# Patient Record
Sex: MALE | Race: WHITE | NOT HISPANIC OR LATINO | Employment: OTHER | URBAN - METROPOLITAN AREA
[De-identification: names, ages, dates, MRNs, and addresses within clinical notes are randomized per-mention and may not be internally consistent; named-entity substitution may affect disease eponyms.]

---

## 2017-01-03 DIAGNOSIS — F17.200 TOBACCO USE DISORDER: Primary | ICD-10-CM

## 2017-01-03 RX ORDER — VARENICLINE TARTRATE 1 MG/1
TABLET, FILM COATED ORAL
Qty: 60 TABLET | Status: CANCELLED | OUTPATIENT
Start: 2017-01-03

## 2017-01-03 NOTE — TELEPHONE ENCOUNTER
Varenicline Tartrate (CHANTIX PO)      Last Written Prescription Date:  na  Last Fill Quantity: na,   # refills: na  Last Office Visit with [facility], P or [facility] prescribing provider: 12/08/16  Future Office visit:       Routing refill request to provider for review/approval because:  Medication is reported/historical      [facility]

## 2017-01-05 RX ORDER — VARENICLINE TARTRATE 1 MG/1
1 TABLET, FILM COATED ORAL 2 TIMES DAILY
Qty: 60 TABLET | Refills: 4 | Status: SHIPPED | OUTPATIENT
Start: 2017-02-04 | End: 2017-03-04

## 2017-02-01 ENCOUNTER — TELEPHONE (OUTPATIENT)
Dept: ORTHOPEDICS | Facility: CLINIC | Age: 62
End: 2017-02-01

## 2017-02-01 NOTE — TELEPHONE ENCOUNTER
VORB order left in VM message to [facility] de[facility] re: ARCR with open distal claviculectomy. He is OK to D/C s ling use per Dr Brower.  Davion Lamar OPA

## 2017-02-01 NOTE — TELEPHONE ENCOUNTER
Kendra from [facility] calling. How long is he to ware his sling. He is 7 wks post op. Please call and advise.

## 2017-02-02 ENCOUNTER — TELEPHONE (OUTPATIENT)
Dept: PHYSICAL THERAPY | Facility: CLINIC | Age: 62
End: 2017-02-02

## 2017-02-03 NOTE — TELEPHONE ENCOUNTER
Called patient because he had an appointment on 1/26/17 with [facility] for physical therapy.  The patient left without being seen because he was unhappy.  Called patient to understand the situation, and problems he had, and to apologize for any problems.  Patient was upset because he went to old clinic location for his appointment.  He stated that didn't receive a letter/email saying the clinic had moved.  The directions at the old clinic where unclear and Google maps wasn't very helpful.  Mayur was also upset there wasn't any signage on the building to tell him [facility] was in the [facility].  Once the patient arrived to the new clinic the check in process wasn't clear and he was pressed for time because he needed to be back at work.  Mayur stated his last straw was the [facility] check in line being the same line as urgent care and using the same waiting room as sick people.  He was happy with previous care at the old building but wanted to get care elsewhere.  I apologized for the problems he had and wished him well in his recovery.

## 2017-02-13 DIAGNOSIS — I10 ESSENTIAL HYPERTENSION WITH GOAL BLOOD PRESSURE LESS THAN 140/90: ICD-10-CM

## 2017-02-13 NOTE — TELEPHONE ENCOUNTER
triamterene-hydrochlorothiazide (MAXZIDE) 75-50 MG per tablet      Last Written Prescription Date: 9/7/16  Last Fill Quantity: 30, # refills: 2  Last Office Visit with G, P or [facility] prescribing provider: 128/16       Potassium   Date Value Ref Range Status   12/08/2016 3.9 3.4 - 5.3 mmol/L Final     Creatinine   Date Value Ref Range Status   12/08/2016 0.97 0.66 - 1.25 mg/dL Final     BP Readings from Last 3 Encounters:   12/14/16 156/85   12/08/16 138/80   11/30/16 (!) 153/91

## 2017-02-14 NOTE — TELEPHONE ENCOUNTER
Routing refill request to provider for review/approval because:  Labs out of range:  Last blood pressure reading was elevated at 156/85.  Jacque Hadley RN

## 2017-02-15 NOTE — TELEPHONE ENCOUNTER
Reason for Call:  Other prescription    Detailed comments: Pt's Spouse calling back for Pt has been without medication for a couple of days and is concerned.  He would like to see if Dr. Valladares can refill Rx as soon as possible and he would liek a call back when rx has been senet.    Phone Number Patient can be reached at: Cell number on file:    No relevant phone numbers on file.       Best Time: Anytime    Can we leave a detailed message on this number? YES    Call taken on 2/15/2017 at 1:49 PM by Michael Jonas

## 2017-02-16 RX ORDER — TRIAMTERENE AND HYDROCHLOROTHIAZIDE 75; 50 MG/1; MG/1
1 TABLET ORAL DAILY
Qty: 30 TABLET | Refills: 3 | Status: SHIPPED | OUTPATIENT
Start: 2017-02-16 | End: 2017-05-30

## 2017-02-17 ENCOUNTER — TELEPHONE (OUTPATIENT)
Dept: ORTHOPEDICS | Facility: CLINIC | Age: 62
End: 2017-02-17

## 2017-02-17 NOTE — TELEPHONE ENCOUNTER
See Visto message sent today in response. P: JBR requests f/u vist to assess shoulder prior to any repeat MRI.       To   Mayur Sheth    Sent   2/17/2017  9:00 AM          Jeyson Merchant;     Sorry to hear about your fall. Dr Brower reviewed options and wants to see you prior to any new MRI to assess your shoulder function.  We made a return visit with you on Feb 21 @ [facility] at 10:30 am. MAYUR SHETH MRN: [id number]   Date: 2/21/2017 Status: Scheduled   Time: 10:30 AM     If MRI indicated we can order after visit.  Thank you for the update Mayur.     [facility].            Davion Lamar OPA

## 2017-02-17 NOTE — TELEPHONE ENCOUNTER
Patient has started physical therapy but slipped on the ice 2 weeks ago and he has had to stop PT because of the pain, patient is requesting an MRI to look at his left shoulder for a new injury and would like a follow up appointment with Dr. Brower to review. Please call and advise Thank you.

## 2017-02-17 NOTE — TELEPHONE ENCOUNTER
Medication filled.  Jill Valladares wants patient to know that he is due for a Hypertension follow up visit.  Patient notified.  Patient refuses to be seen before May when he had a physical.    Romelia Alcala RN

## 2017-02-21 ENCOUNTER — RADIANT APPOINTMENT (OUTPATIENT)
Dept: GENERAL RADIOLOGY | Facility: CLINIC | Age: 62
End: 2017-02-21
Attending: ORTHOPAEDIC SURGERY
Payer: COMMERCIAL

## 2017-02-21 ENCOUNTER — OFFICE VISIT (OUTPATIENT)
Dept: ORTHOPEDICS | Facility: CLINIC | Age: 62
End: 2017-02-21
Payer: COMMERCIAL

## 2017-02-21 VITALS — HEIGHT: 65 IN | WEIGHT: 222.8 LBS | RESPIRATION RATE: 12 BRPM | BODY MASS INDEX: 37.12 KG/M2

## 2017-02-21 DIAGNOSIS — M25.512 ACUTE POSTOPERATIVE PAIN OF LEFT SHOULDER: ICD-10-CM

## 2017-02-21 DIAGNOSIS — G89.18 ACUTE POSTOPERATIVE PAIN OF LEFT SHOULDER: ICD-10-CM

## 2017-02-21 DIAGNOSIS — Z98.890 S/P COMPLETE REPAIR OF ROTATOR CUFF: Primary | ICD-10-CM

## 2017-02-21 DIAGNOSIS — Z98.890 S/P COMPLETE REPAIR OF ROTATOR CUFF: ICD-10-CM

## 2017-02-21 PROCEDURE — 73030 X-RAY EXAM OF SHOULDER: CPT | Mod: LT

## 2017-02-21 PROCEDURE — 99024 POSTOP FOLLOW-UP VISIT: CPT | Performed by: ORTHOPAEDIC SURGERY

## 2017-02-21 ASSESSMENT — PAIN SCALES - GENERAL: PAINLEVEL: SEVERE PAIN (6)

## 2017-02-21 NOTE — PROGRESS NOTES
"Mayur is here status post arthroscopic left RCR, open distal claviculectomy. DOS: 12/14/16, 2 months ago. Biceps was absent, so no tenodesis. He had a fall 10 days ago and had to stop physical therapy because of burning pain in his left shoulder. He and his therapist are wondering about a new MRI scan.     Symptoms have gradually improved since that time.   Present symptoms: \"sharp burning\" pain over lateral shoulder     Resp 12  Ht 1.657 m (5' 5.25\")  Wt 101.1 kg (222 lb 12.8 oz)  BMI 36.79 kg/m2    Exam:    Tender: anterior acromion, distal clavicle   ROM:    Active: internal rotation L4   Passive: forward flexion 125 degrees, external rotation 60 degrees   Strength: he is able to hold up the arm against gravity and with light resistance, external rotation 5/5     X-rays: Obtained today of the LEFT SHOULDER: 3-views, reviewed in the office with the patient by myself today and show no fracture, including the acromion.     Assessment:     1. Doing well status post arthroscopic left RCR, open distal claviculectomy.     I don't think he re-injured his rotator cuff based on our limited exam.    Plan:  He does not need a new MRI. Continue with physical therapy.     Rehab plan:   AAROM x  Weeks 6-10 and AROM at the 10 week chey.     Return to clinic in 4 weeks.     MARLENY Brower MD  Dept. Orthopedic Surgery  [facility]    This document serves as a record of the services and decisions personally performed and made by Dr. MARLENY Brower MD. It was created on his behalf by Rin Fuentes, a trained medical scribe. The creation of this record is based on the provider's personal observations and the statements of the patient. This document has been checked and approved by the attending provider.   Rin Fuentes February 21, 2017 4:18 PM  "

## 2017-02-21 NOTE — MR AVS SNAPSHOT
"              After Visit Summary   2/21/2017    Mayur Sheth    MRN: [id number]           Patient Information     Date Of Birth          1955        Visit Information        Provider Department      2/21/2017 3:15 PM Donell Brower MD [facility]         Follow-ups after your visit        Who to contact     If you have questions or need follow up information about today's clinic visit or your schedule please contact [facility] directly at [phone].  Normal or non-critical lab and imaging results will be communicated to you by [employer], letter or phone within 4 business days after the clinic has received the results. If you do not hear from us within 7 days, please contact the clinic through [employer] or phone. If you have a critical or abnormal lab result, we will notify you by phone as soon as possible.  Submit refill requests through [employer] or call your pharmacy and they will forward the refill request to us. Please allow 3 business days for your refill to be completed.          Additional Information About Your Visit        MyChart Information     [employer] gives you secure access to your electronic health record. If you see a primary care provider, you can also send messages to your care team and make appointments. If you have questions, please call your primary care clinic.  If you do not have a primary care provider, please call [phone] and they will assist you.        Care EveryWhere ID     This is your Care EveryWhere ID. This could be used by other organizations to access your [place] medical records  [id number]        Your Vitals Were     Respirations Height BMI (Body Mass Index)             12 1.657 m (5' 5.25\") 36.79 kg/m2          Blood Pressure from Last 3 Encounters:   12/14/16 156/85   12/08/16 138/80   11/30/16 (!) 153/91    Weight from Last 3 Encounters:   02/21/17 101.1 kg (222 lb 12.8 oz)   12/28/16 101.4 kg (223 lb 9.6 oz)   12/07/16 95.3 kg " (210 lb)              Today, you had the following     No orders found for display       Primary Care Provider Office Phone # Fax #    Atul Valladares [phone]       [facility] [address]  [facility] [address]        Thank you!     Thank you for choosing [facility]  for your care. Our goal is always to provide you with excellent care. Hearing back from our patients is one way we can continue to improve our services. Please take a few minutes to complete the written survey that you may receive in the mail after your visit with us. Thank you!             Your Updated Medication List - Protect others around you: Learn how to safely use, store and throw away your medicines at www.disposemymeds.org.          This list is accurate as of: 2/21/17  3:23 PM.  Always use your most recent med list.                   Brand Name Dispense Instructions for use    aspirin 325 MG EC tablet      1 TABLET DAILY*       CALCIUM 600+D PO      Take by mouth daily       CO Q 10 PO          cyclobenzaprine 10 MG tablet    FLEXERIL    90 tablet    Take 1 tablet (10 mg) by mouth 3 times daily as needed for muscle spasms       hydrOXYzine 25 MG capsule    VISTARIL    50 capsule    Take every 6 hours as needed for pain.       ibuprofen 200 MG capsule      Take 600 mg by mouth 3 times daily as needed for rib pain. Take with food.       Multi-vitamin Tabs tablet   Generic drug:  multivitamin, therapeutic with minerals      1 TABLET DAILY       oxyCODONE-acetaminophen 5-325 MG per tablet    PERCOCET    40 tablet    Take 1 tablet by mouth every 4 hours as needed for pain       pravastatin 80 MG tablet    PRAVACHOL    30 tablet    Take 1 tablet (80 mg) by mouth every evening for cholesterol.       PROBIOTIC DAILY PO      Take 0.5 chew tab by mouth daily       ranitidine 75 MG tablet    ZANTAC     1 tablet 2 times daily as needed. for reflux symptoms.       triamterene-hydrochlorothiazide 75-50  MG per tablet    MAXZIDE    30 tablet    Take 1 tablet by mouth daily for blood pressure.       varenicline 1 MG tablet    CHANTIX CONTINUING MONTH BLOSSOM    60 tablet    Take 1 tablet (1 mg) by mouth 2 times daily

## 2017-04-28 DIAGNOSIS — E78.5 HYPERLIPIDEMIA LDL GOAL <100: ICD-10-CM

## 2017-04-28 RX ORDER — PRAVASTATIN SODIUM 80 MG/1
80 TABLET ORAL EVERY EVENING
Qty: 30 TABLET | Refills: 6 | Status: SHIPPED | OUTPATIENT
Start: 2017-04-28 | End: 2017-06-15

## 2017-04-28 NOTE — TELEPHONE ENCOUNTER
pravastatin (PRAVACHOL) 80 MG tablet     Last Written Prescription Date: 10/19/16  Last Fill Quantity: 30, # refills: 5  Last Office Visit with G, P or [facility] prescribing provider: 12/08/16       Lab Results   Component Value Date    CHOL 157 12/08/2016     Lab Results   Component Value Date    HDL 39 12/08/2016     Lab Results   Component Value Date    LDL 81 12/08/2016    LDL 71 05/10/2016     Lab Results   Component Value Date    TRIG 273 12/08/2016     Lab Results   Component Value Date    CHOLHDLRATIO 4.1 04/22/2015         Roseanne Salcido  [facility]

## 2017-04-28 NOTE — TELEPHONE ENCOUNTER
Prescription approved per [facility] Refill Protocol.  Romelia Alcala RN

## 2017-05-30 DIAGNOSIS — I10 ESSENTIAL HYPERTENSION WITH GOAL BLOOD PRESSURE LESS THAN 140/90: ICD-10-CM

## 2017-05-30 NOTE — TELEPHONE ENCOUNTER
triamterene-hydrochlorothiazide (MAXZIDE) 75-50 MG per tablet      Last Written Prescription Date: 2/16/17  Last Fill Quantity: 30, # refills: 3  Last Office Visit with [facility], [facility] or [place] prescribing provider: 12/8/16       Potassium   Date Value Ref Range Status   12/08/2016 3.9 3.4 - 5.3 mmol/L Final     Creatinine   Date Value Ref Range Status   12/08/2016 0.97 0.66 - 1.25 mg/dL Final     BP Readings from Last 3 Encounters:   12/14/16 156/85   12/08/16 138/80   11/30/16 (!) 153/91           Ajay Faarax  [facility]

## 2017-06-02 NOTE — TELEPHONE ENCOUNTER
Routing refill request to provider for review/approval because:  Elevated blood pressure reading.  Jacque Hadley RN

## 2017-06-05 RX ORDER — TRIAMTERENE AND HYDROCHLOROTHIAZIDE 75; 50 MG/1; MG/1
TABLET ORAL
Qty: 30 TABLET | Refills: 0 | Status: SHIPPED | OUTPATIENT
Start: 2017-06-05 | End: 2017-06-15

## 2017-06-06 NOTE — TELEPHONE ENCOUNTER
Called patient and informed. He wants to schedule physical. Appt scheduled for mid June with Dr Valladares.  Sami Headley CMA

## 2017-06-15 ENCOUNTER — OFFICE VISIT (OUTPATIENT)
Dept: FAMILY MEDICINE | Facility: CLINIC | Age: 62
End: 2017-06-15
Payer: COMMERCIAL

## 2017-06-15 VITALS
SYSTOLIC BLOOD PRESSURE: 110 MMHG | BODY MASS INDEX: 36.32 KG/M2 | HEIGHT: 65 IN | DIASTOLIC BLOOD PRESSURE: 80 MMHG | OXYGEN SATURATION: 94 % | WEIGHT: 218 LBS | HEART RATE: 92 BPM | TEMPERATURE: 99.1 F

## 2017-06-15 DIAGNOSIS — E78.5 HYPERLIPIDEMIA LDL GOAL <100: ICD-10-CM

## 2017-06-15 DIAGNOSIS — F17.200 TOBACCO USE DISORDER: ICD-10-CM

## 2017-06-15 DIAGNOSIS — I10 ESSENTIAL HYPERTENSION WITH GOAL BLOOD PRESSURE LESS THAN 140/90: ICD-10-CM

## 2017-06-15 DIAGNOSIS — Z00.00 ENCOUNTER FOR ROUTINE ADULT HEALTH EXAMINATION WITHOUT ABNORMAL FINDINGS: Primary | ICD-10-CM

## 2017-06-15 LAB
ALT SERPL W P-5'-P-CCNC: 41 U/L (ref 0–70)
ANION GAP SERPL CALCULATED.3IONS-SCNC: 8 MMOL/L (ref 3–14)
BUN SERPL-MCNC: 14 MG/DL (ref 7–30)
CALCIUM SERPL-MCNC: 8.9 MG/DL (ref 8.5–10.1)
CHLORIDE SERPL-SCNC: 100 MMOL/L (ref 94–109)
CHOLEST SERPL-MCNC: 151 MG/DL
CO2 SERPL-SCNC: 30 MMOL/L (ref 20–32)
CREAT SERPL-MCNC: 0.98 MG/DL (ref 0.66–1.25)
GFR SERPL CREATININE-BSD FRML MDRD: 78 ML/MIN/1.7M2
GLUCOSE SERPL-MCNC: 94 MG/DL (ref 70–99)
HDLC SERPL-MCNC: 38 MG/DL
LDLC SERPL CALC-MCNC: 56 MG/DL
NONHDLC SERPL-MCNC: 113 MG/DL
POTASSIUM SERPL-SCNC: 3.6 MMOL/L (ref 3.4–5.3)
SODIUM SERPL-SCNC: 138 MMOL/L (ref 133–144)
TRIGL SERPL-MCNC: 285 MG/DL

## 2017-06-15 PROCEDURE — 80061 LIPID PANEL: CPT | Performed by: FAMILY MEDICINE

## 2017-06-15 PROCEDURE — 36415 COLL VENOUS BLD VENIPUNCTURE: CPT | Performed by: FAMILY MEDICINE

## 2017-06-15 PROCEDURE — 80048 BASIC METABOLIC PNL TOTAL CA: CPT | Performed by: FAMILY MEDICINE

## 2017-06-15 PROCEDURE — 84460 ALANINE AMINO (ALT) (SGPT): CPT | Performed by: FAMILY MEDICINE

## 2017-06-15 PROCEDURE — 99396 PREV VISIT EST AGE 40-64: CPT | Performed by: FAMILY MEDICINE

## 2017-06-15 RX ORDER — TRIAMTERENE AND HYDROCHLOROTHIAZIDE 75; 50 MG/1; MG/1
1 TABLET ORAL DAILY
Qty: 30 TABLET | Refills: 5 | Status: SHIPPED | OUTPATIENT
Start: 2017-06-15 | End: 2018-01-28

## 2017-06-15 RX ORDER — VARENICLINE TARTRATE 1 MG/1
1 TABLET, FILM COATED ORAL 2 TIMES DAILY
Qty: 60 TABLET | Refills: 4 | Status: SHIPPED | OUTPATIENT
Start: 2017-07-15 | End: 2017-11-29

## 2017-06-15 RX ORDER — PRAVASTATIN SODIUM 80 MG/1
80 TABLET ORAL EVERY EVENING
Qty: 30 TABLET | Refills: 5 | Status: SHIPPED | OUTPATIENT
Start: 2017-06-15 | End: 2017-12-16

## 2017-06-15 NOTE — PROGRESS NOTES
SUBJECTIVE:     CC: Mayur Sheth is an 62 year old male who presents for preventative health visit.     Healthy Habits:    Do you get at least three servings of calcium containing foods daily (dairy, green leafy vegetables, etc.)? no, taking calcium and/or vitamin D supplement: yes - calcium    Amount of exercise or daily activities, outside of work: 0 day(s) per week    Problems taking medications regularly No    Medication side effects: No    Have you had an eye exam in the past two years? yes    Do you see a dentist twice per year? yes    Do you have sleep apnea, excessive snoring or daytime drowsiness?no      -------------------------------------    Today's PHQ-2 Score:   PHQ-2 ( 1999 Pfizer) 6/15/2017 12/8/2016   Q1: Little interest or pleasure in doing things 0 0   Q2: Feeling down, depressed or hopeless 0 0   PHQ-2 Score 0 0       Abuse: Current or Past(Physical, Sexual or Emotional)- No  Do you feel safe in your environment - Yes    Social History   Substance Use Topics     Smoking status: Former Smoker     Packs/day: 0.50     Years: 40.00     Types: Cigarettes     Quit date: 2/3/2014     Smokeless tobacco: Never Used      Comment: requit, uses Chantix     Alcohol use 3.6 oz/week     6 Standard drinks or equivalent per week      Comment: 5 beers per month      The patient does not drink >3 drinks per day nor >7 drinks per week.    Last PSA:   PSA   Date Value Ref Range Status   10/05/2010 1.02 0 - 4 ug/L Final       Recent Labs   Lab Test  12/08/16   1759  05/10/16   0757  04/22/15   0650  03/17/14   0744   CHOL  157  156  154  169   HDL  39*  37*  38*  35*   LDL  81  71  59  75   TRIG  273*  240*  286*  296*   CHOLHDLRATIO   --    --   4.1  4.8   NHDL   --   119   --    --        Reviewed orders with patient. Reviewed health maintenance and updated orders accordingly - Yes    Reviewed and updated as needed this visit by clinical staff  Tobacco  Allergies  Meds  Med Hx  Surg Hx  Fam Hx  Soc Hx       Reviewed and updated as needed this visit by Provider        Past Medical History:   Diagnosis Date     AC (acromioclavicular) joint arthritis 12/1/2016     Bee sting allergies      Diverticulosis      Essential hypertension, benign 2006     GERD (gastroesophageal reflux disease)      Hyperlipidemia LDL goal <100     Fam Hx CAD     Obesity, Class II, BMI 35-39.9, with comorbidity (H) 2/14/2013     Rotator cuff arthropathy, left      S/P rotator cuff repair 12/14/16    left     Tobacco use disorder 10/23/2011     Tubular adenoma 3/24/08      Past Surgical History:   Procedure Laterality Date     ARTHROSCOPY SHLDR ROTATOR CUFF REPAIR, SUBACROMIAL DECOMP, DIST CLAVICLE RESECTION, BICEP TENODESIS Left 12/14/2016    Procedure: ARTHROSCOPY SHOULDER ROTATOR CUFF REPAIR, SUBACROMIAL DECOMPRESSION, DISTAL CLAVICLE RESECTION, OPEN BICEP TENODESIS REPAIR;  Surgeon: Donell Brower MD;  Location: [facility] OR     C REPAIR CRUCIATE LIGAMENT,KNEE  ~1992    LT ACL     TONSILLECTOMY         ROS:  C: NEGATIVE for fever, chills, change in weight  I: NEGATIVE for worrisome rashes, moles or lesions  ENT: NEGATIVE for ear, mouth and throat problems. Patient had recent dental work done today. Relates that he has had another tooth extracted.   R: NEGATIVE for significant cough or SOB. Chantix helped him to stop smoking for 4-5 months. Resumed after son moved in about 6 months ago.   CV: NEGATIVE for chest pain, palpitations or peripheral edema. When he checks his blood pressure out of clinic, it tends to be around 120/90 on average. He feels that his machine will have some inaccuracies with the systolic pressures.   All other systems reviewed and were negative.      This document serves as a record of the services and decisions personally performed and made by Atul Valladares MD. It was created on his/her behalf by Barbara Alex, a trained medical scribe. The creation of this document is based the provider's statements to the  "medical scribe.    Sharmin Alex 5:43 PM, Dyan 15, 2017     Problem list, Medication list, Allergies, and Medical/Social/Surgical histories reviewed in [facility] and updated as appropriate.  Labs reviewed in EPIC  OBJECTIVE:     /80  Pulse 92  Temp 99.1  F (37.3  C) (Oral)  Ht 1.657 m (5' 5.25\")  Wt 98.9 kg (218 lb)  SpO2 94%  BMI 36 kg/m2  EXAM:  GENERAL: healthy, alert and no distress  EYES: Eyes grossly normal to inspection, PERRL and conjunctivae and sclerae normal  HENT: ear canals and TM's normal, nose and mouth without ulcers or lesions  NECK: no adenopathy, no asymmetry, masses, or scars and thyroid normal to palpation  RESP: lungs clear to auscultation - no rales, rhonchi or wheezes  CV: regular rate and rhythm, normal S1 S2, no S3 or S4, no murmur, click or rub, no peripheral edema and peripheral pulses strong  ABDOMEN: soft, nontender, no hepatosplenomegaly, no masses and bowel sounds normal   (male): normal male genitalia without lesions or urethral discharge, no hernia  MS: no gross musculoskeletal defects noted, no edema  SKIN: no suspicious lesions or rashes  NEURO: Normal strength and tone, mentation intact and speech normal  PSYCH: mentation appears normal, affect normal/bright    ASSESSMENT/PLAN:     (Z00.00) Encounter for routine adult health examination without abnormal findings  (primary encounter diagnosis)  Comment: Negative screening exam. Up to date on preventive services.   Plan: Can Do Program Referral        Recommend he receives three vaccines at follow up in 6 months.     (E78.5) Hyperlipidemia LDL goal <100  Comment: Fasting   Plan: pravastatin (PRAVACHOL) 80 MG tablet, Lipid         panel reflex to direct LDL, ALT        Follow up 6 months.     (I10) Essential hypertension with goal blood pressure less than 140/90  Comment: Well controlled.  Plan: triamterene-hydrochlorothiazide (MAXZIDE) 75-50        MG per tablet, Basic metabolic panel        Follow up 6 months. " "    (F17.200) Tobacco use disorder  Comment: Resumed smoking a couple months ago due to external stressor. He is willing to restart the Chantix which is helpful.   Plan: varenicline (CHANTIX CONTINUING MONTH BLOSSOM) 1 MG        tablet, varenicline (CHANTIX STARTING MONTH         BLOSSOM) 0.5 MG X 11 & 1 MG X 42 tablet            (E66.01) Obesity, Class II, BMI 35-39.9, with comorbidity (H)  Comment: He had a weight increase following his shoulder surgery. He is working on this.   Plan: Can Do Program Referral              COUNSELING:  Reviewed preventive health counseling, as reflected in patient instructions       Smoking Cessation       Regular exercise       Advance Care Planning   reports that he quit smoking about 3 years ago. His smoking use included Cigarettes. He has a 20.00 pack-year smoking history. He has never used smokeless tobacco.  Estimated body mass index is 36.79 kg/(m^2) as calculated from the following:    Height as of 2/21/17: 5' 5.25\" (1.657 m).    Weight as of 2/21/17: 222 lb 12.8 oz (101.1 kg).   Weight management plan: Discussed healthy diet and exercise guidelines and patient will follow up in 12 months in clinic to re-evaluate.    Counseling Resources:  ATP IV Guidelines  Pooled Cohorts Equation Calculator  FRAX Risk Assessment  ICSI Preventive Guidelines  Dietary Guidelines for Americans, 2010  USDA's MyPlate  ASA Prophylaxis  Lung CA Screening    The information in this document, created by the medical scribe for me, accurately reflects the services I personally performed and the decisions made by me. I have reviewed and approved this document for accuracy prior to leaving the patient care area.  Atul Valladares MD  5:43 PM, 06/15/17  "

## 2017-06-15 NOTE — MR AVS SNAPSHOT
After Visit Summary   6/15/2017    Mayur Sheth    MRN: [id number]           Patient Information     Date Of Birth          1955        Visit Information        Provider Department      6/15/2017 5:40 PM Atul Valladares MD [facility]        Today's Diagnoses     Encounter for routine adult health examination without abnormal findings    -  1    Hyperlipidemia LDL goal <100        Essential hypertension with goal blood pressure less than 140/90        Tobacco use disorder        Obesity, Class II, BMI 35-39.9, with comorbidity (H)          Care Instructions      Preventive Health Recommendations  Male Ages 50 - 64    Yearly exam:             See your health care provider every year in order to  o   Review health changes.   o   Discuss preventive care.    o   Review your medicines if your doctor has prescribed any.     Have a cholesterol test every 5 years, or more frequently if you are at risk for high cholesterol/heart disease.     Have a diabetes test (fasting glucose) every three years. If you are at risk for diabetes, you should have this test more often.     Have a colonoscopy at age 50, or have a yearly FIT test (stool test). These exams will check for colon cancer.      Talk with your health care provider about whether or not a prostate cancer screening test (PSA) is right for you.    You should be tested each year for STDs (sexually transmitted diseases), if you re at risk.     Shots: Get a flu shot each year. Get a tetanus shot every 10 years.     Nutrition:    Eat at least 5 servings of fruits and vegetables daily.     Eat whole-grain bread, whole-wheat pasta and brown rice instead of white grains and rice.     Talk to your provider about Calcium and Vitamin D.     Lifestyle    Aerobic exercise for at least 150 minutes a week (40+ minutes a day, 4-6 days a week). This will help you control your weight and prevent disease.     Limit alcohol to one drink per day.      No smoking.     Wear sunscreen to prevent skin cancer.     See your dentist every six months for an exam and cleaning.     See your eye doctor every 1 to 2 years.  This summary includes the important diagnoses, test, medications and other important parts of your medical history.  Below are a few good we sites you can use to learn more about these.     Www.EmergentDetection[facility] : Up to date and easily searchable information on multiple topics.  Www.EmergentDetection.[facility]/Pharmacy/c_539084.asp : [place] [facility] $4.99 medications  Www.medlineplus.gov : medication info, interactive tutorials, watch real surgeries online  Www.familydoctor.org : good info from the Academy of Family Physicians  Www.InstyBook.Justyle : good info from the [facility]  Www.cdc.gov : public health info, travel advisories, epidemics (H1N1)  Www.aap.org : children's health info, normal development, vaccinations  Www.health.[facility].[place].us : [place] dept of heat, public health issues in [place], N1N1    Based on your medical history and these are the current health maintenance or preventive care services that you are due for (some may have been done at this visit:)  Health Maintenance Due   Topic Date Due     ADVANCE DIRECTIVE PLANNING Q5 YRS  01/17/2017     =================================================================================  Normal Values   Blood pressure  <140/90 for most adults    <130/80 for some chronic diseases (ask your care team about yours)    BMI (body mass index)  18.5-25 kg/m2 (based on height and weight)     Thank you for visiting [facility]    Normal or non-critical lab and imaging results will be communicated to you by MyChart, letter or phone within 7 days.  If you do not hear from us within 10 days, please call the clinic. If you have a critical or abnormal lab result, we will notify you by phone as soon as possible.     If you have any questions regarding your visit please contact:     Team Comfort:   Clinic Hours  Telephone Number   Dr. Atul Colon  Liat Schulz   7am-5pm  Monday - Friday [phone]  [facility]   Pharmacy 8am-8pm Monday-Thursday      8am-6pm Friday  9am-5pm Saturday-Sunday [phone]   Urgent Care 11am-8pm Monday-Friday        9am-5pm Saturday-Sunday [phone]     After hours, weekend or if you need to make an appointment with your primary provider please call [phone].   After Hours nurse advise: call [facility]: [phone]    Medication Refills:  Call your pharmacy and they will forward the refill to us. Please allow 3 business days for your refills to be completed.    Use [employer] (secure email communication and access to your chart) to send your primary care provider a message or make an appointment. Ask someone on your Team how to sign up for [employer]. To log on to Secret Recipe or for more information in lifecake please visit the website at [email or web address].  As of October 8, 2013, all password changes, disabled accounts, or ID changes in [employer]/MyHealth will be done by our Access Services Department.   If you need help with your account or password, call: [phone]. Clinic staff no longer has the ability to change passwords.             Follow-ups after your visit        Additional Services     Can Do Program Referral       CAN DO is different from other healthy lifestyle and weight loss programs. CAN DO personal health coaches support you in clarifying what you want for yourself and your well-being. Together we create a roadmap to guide your journey to thriving well-being - one step at a time - for lasting success. Working under the leadership of a physician medical director, our health coaching team includes a doctorate -level prepared expert in health management, a physician and an exercise specialist. We support you in identifying what is important to you, setting meaningful goals and addressing  "barriers to success. Together we chey your progress over time. CAN DO has helped hundreds reach their goals: from people who just want to be healthier and enjoy more energy, to those who want to lose or gain weight.                  Follow-up notes from your care team     Return in about 6 months (around 12/15/2017) for blood pressure check, immunization update.      Who to contact     If you have questions or need follow up information about today's clinic visit or your schedule please contact [facility] directly at [phone].  Normal or non-critical lab and imaging results will be communicated to you by Mobile Factory[employer], letter or phone within 4 business days after the clinic has received the results. If you do not hear from us within 7 days, please contact the clinic through [employer] or phone. If you have a critical or abnormal lab result, we will notify you by phone as soon as possible.  Submit refill requests through [employer] or call your pharmacy and they will forward the refill request to us. Please allow 3 business days for your refill to be completed.          Additional Information About Your Visit        [employer] Information     [employer] gives you secure access to your electronic health record. If you see a primary care provider, you can also send messages to your care team and make appointments. If you have questions, please call your primary care clinic.  If you do not have a primary care provider, please call [phone] and they will assist you.        Care EveryWhere ID     This is your Care EveryWhere ID. This could be used by other organizations to access your [place] medical records  [id number]        Your Vitals Were     Pulse Temperature Height Pulse Oximetry BMI (Body Mass Index)       92 99.1  F (37.3  C) (Oral) 5' 5.25\" (1.657 m) 94% 36 kg/m2        Blood Pressure from Last 3 Encounters:   06/15/17 110/80   12/14/16 156/85   12/08/16 138/80    Weight from Last 3 Encounters: "   06/15/17 218 lb (98.9 kg)   02/21/17 222 lb 12.8 oz (101.1 kg)   12/28/16 223 lb 9.6 oz (101.4 kg)              We Performed the Following     ALT     Basic metabolic panel     Can Do Program Referral     Lipid panel reflex to direct LDL          Today's Medication Changes          These changes are accurate as of: 6/15/17  6:03 PM.  If you have any questions, ask your nurse or doctor.               Start taking these medicines.        Dose/Directions    * varenicline 0.5 MG X 11 & 1 MG X 42 tablet   Commonly known as:  CHANTIX STARTING MONTH BLOSSOM   Used for:  Tobacco use disorder   Started by:  Atul Valladares MD        0.5mg 1 tab every morning for 3 days, then 0.5mg 1 tab twice daily for 4 days, then 1mg 1 tab twice daily.   Quantity:  53 tablet   Refills:  0       * varenicline 1 MG tablet   Commonly known as:  CHANTIX CONTINUING MONTH BLOSSOM   Used for:  Tobacco use disorder   Started by:  Atul Valladares MD        Dose:  1 mg   Start taking on:  7/15/2017   Take 1 tablet (1 mg) by mouth 2 times daily   Quantity:  60 tablet   Refills:  4       * Notice:  This list has 2 medication(s) that are the same as other medications prescribed for you. Read the directions carefully, and ask your doctor or other care provider to review them with you.      These medicines have changed or have updated prescriptions.        Dose/Directions    triamterene-hydrochlorothiazide 75-50 MG per tablet   Commonly known as:  MAXZIDE   This may have changed:  See the new instructions.   Used for:  Essential hypertension with goal blood pressure less than 140/90   Changed by:  Atul Valladares MD        Dose:  1 tablet   Take 1 tablet by mouth daily for blood pressure.   Quantity:  30 tablet   Refills:  5            Where to get your medicines      These medications were sent to [facility] [address] [place] [address], [place] [address] [facility] [address]  [facility] [facility] [address], [facility] [address]     Phone:  [phone]     pravastatin 80 MG  tablet    triamterene-hydrochlorothiazide 75-50 MG per tablet    varenicline 0.5 MG X 11 & 1 MG X 42 tablet    varenicline 1 MG tablet                Primary Care Provider Office Phone # Fax #    Atul Valladares [phone]       [facility] [address]  [facility] [address]        Thank you!     Thank you for choosing [facility]  for your care. Our goal is always to provide you with excellent care. Hearing back from our patients is one way we can continue to improve our services. Please take a few minutes to complete the written survey that you may receive in the mail after your visit with us. Thank you!             Your Updated Medication List - Protect others around you: Learn how to safely use, store and throw away your medicines at www.disposemymeds.org.          This list is accurate as of: 6/15/17  6:03 PM.  Always use your most recent med list.                   Brand Name Dispense Instructions for use    aspirin 325 MG EC tablet      1 TABLET DAILY*       CALCIUM 600+D PO      Take by mouth daily       CO Q 10 PO          cyclobenzaprine 10 MG tablet    FLEXERIL    90 tablet    Take 1 tablet (10 mg) by mouth 3 times daily as needed for muscle spasms       ibuprofen 200 MG capsule      Take 600 mg by mouth 3 times daily as needed for rib pain. Take with food.       Multi-vitamin Tabs tablet   Generic drug:  multivitamin, therapeutic with minerals      1 TABLET DAILY       pravastatin 80 MG tablet    PRAVACHOL    30 tablet    Take 1 tablet (80 mg) by mouth every evening for cholesterol.       PROBIOTIC DAILY PO      Take 0.5 chew tab by mouth daily       ranitidine 75 MG tablet    ZANTAC     1 tablet 2 times daily as needed. for reflux symptoms.       triamterene-hydrochlorothiazide 75-50 MG per tablet    MAXZIDE    30 tablet    Take 1 tablet by mouth daily for blood pressure.       * varenicline 0.5 MG X 11 & 1 MG X 42 tablet    CHANTIX STARTING MONTH  BLOSSOM    53 tablet    0.5mg 1 tab every morning for 3 days, then 0.5mg 1 tab twice daily for 4 days, then 1mg 1 tab twice daily.       * varenicline 1 MG tablet   Start taking on:  7/15/2017    CHANTIX CONTINUING MONTH BLOSSOM    60 tablet    Take 1 tablet (1 mg) by mouth 2 times daily       * Notice:  This list has 2 medication(s) that are the same as other medications prescribed for you. Read the directions carefully, and ask your doctor or other care provider to review them with you.

## 2017-06-15 NOTE — PATIENT INSTRUCTIONS
Preventive Health Recommendations  Male Ages 50   64    Yearly exam:             See your health care provider every year in order to  o   Review health changes.   o   Discuss preventive care.    o   Review your medicines if your doctor has prescribed any.     Have a cholesterol test every 5 years, or more frequently if you are at risk for high cholesterol/heart disease.     Have a diabetes test (fasting glucose) every three years. If you are at risk for diabetes, you should have this test more often.     Have a colonoscopy at age 50, or have a yearly FIT test (stool test). These exams will check for colon cancer.      Talk with your health care provider about whether or not a prostate cancer screening test (PSA) is right for you.    You should be tested each year for STDs (sexually transmitted diseases), if you re at risk.     Shots: Get a flu shot each year. Get a tetanus shot every 10 years.     Nutrition:    Eat at least 5 servings of fruits and vegetables daily.     Eat whole-grain bread, whole-wheat pasta and brown rice instead of white grains and rice.     Talk to your provider about Calcium and Vitamin D.     Lifestyle    Aerobic exercise for at least 150 minutes a week (40+ minutes a day, 4-6 days a week). This will help you control your weight and prevent disease.     Limit alcohol to one drink per day.     No smoking.     Wear sunscreen to prevent skin cancer.     See your dentist every six months for an exam and cleaning.     See your eye doctor every 1 to 2 years.  This summary includes the important diagnoses, test, medications and other important parts of your medical history.  Below are a few good we sites you can use to learn more about these.     Www.[employer] Membranes.org : Up to date and easily searchable information on multiple topics.  Www.[employer] Membranes.org/Pharmacy/c_539084.asp : [facility] $4.99 medications  Www.Wetradetogether.gov : medication info, interactive tutorials, watch real surgeries  online  Www.familydoctor.org : good info from the Academy of Family Physicians  Www.mayoclinic.com : good info from the [facility]  Www.cdc.gov : public health info, travel advisories, epidemics (H1N1)  Www.aap.org : children's health info, normal development, vaccinations  Www.health.[facility].mn.us : [place] dept of [facility], public health issues in [place], N1N1    Based on your medical history and these are the current health maintenance or preventive care services that you are due for (some may have been done at this visit:)  Health Maintenance Due   Topic Date Due     ADVANCE DIRECTIVE PLANNING Q5 YRS  01/17/2017     =================================================================================  Normal Values   Blood pressure  <140/90 for most adults    <130/80 for some chronic diseases (ask your care team about yours)    BMI (body mass index)  18.5-25 kg/m2 (based on height and weight)     Thank you for visiting [facility]    Normal or non-critical lab and imaging results will be communicated to you by MyChart, letter or phone within 7 days.  If you do not hear from us within 10 days, please call the clinic. If you have a critical or abnormal lab result, we will notify you by phone as soon as possible.     If you have any questions regarding your visit please contact:     Team Comfort:   Clinic Hours Telephone Number   Dr. Atul Schulz   7am-5pm  Monday - Friday [phone]  [facility]   Pharmacy 8am-8pm Monday-Thursday      8am-6pm Friday  9am-5pm Saturday-Sunday [phone]   Urgent Care 11am-8pm Monday-Friday        9am-5pm Saturday-Sunday [phone]     After hours, weekend or if you need to make an appointment with your primary provider please call [phone].   After Hours nurse advise: call [facility]: [phone]    Medication Refills:  Call your pharmacy and they will forward the refill to  us. Please allow 3 business days for your refills to be completed.    Use [employer] ( HLM )t (secure email communication and access to your chart) to send your primary care provider a message or make an appointment. Ask someone on your Team how to sign up for [employer]. To log on to Vudu or for more information in [employer] please visit the website at [email or web address].  As of October 8, 2013, all password changes, disabled accounts, or ID changes in [employer]/MyHealth will be done by our Access Services Department.   If you need help with your account or password, call: [phone]. Clinic staff no longer has the ability to change passwords.

## 2017-06-15 NOTE — NURSING NOTE
"Chief Complaint   Patient presents with     Physical       Initial /90 (BP Location: Left arm, Patient Position: Chair, Cuff Size: Adult Regular)  Pulse 92  Temp 99.1  F (37.3  C) (Oral)  Ht 5' 5.25\" (1.657 m)  Wt 218 lb (98.9 kg)  SpO2 94%  BMI 36 kg/m2 Estimated body mass index is 36 kg/(m^2) as calculated from the following:    Height as of this encounter: 5' 5.25\" (1.657 m).    Weight as of this encounter: 218 lb (98.9 kg).  Medication Reconciliation: complete     Estrada Gallardo MA      "

## 2017-11-29 DIAGNOSIS — F17.200 TOBACCO USE DISORDER: ICD-10-CM

## 2017-11-29 NOTE — TELEPHONE ENCOUNTER
Requested Prescriptions   Pending Prescriptions Disp Refills     CHANTIX CONTINUING MONTH BLOSSOM 1 MG tablet [Pharmacy Med Name: Chantix Continuing Month Blossom Oral Tablet 1 MG]    Last Written Prescription Date:  7/15/17  Last Fill Quantity: 60,  # refills: 4   Last Office Visit with [facility], [facility] or [facility] prescribing provider:  6/15/17   Future Office Visit:      56 tablet 3     Sig: TAKE 1 TABLET BY MOUTH TWICE DAILY    Partial Cholinergic Nicotinic Agonist Agents Passed    11/29/2017 12:22 PM       Passed - Blood pressure less than 140/90    BP Readings from Last 3 Encounters:   06/15/17 110/80   12/14/16 156/85   12/08/16 138/80                Passed - Recent or future visit with authorizing provider's specialty    Patient had office visit in the last year or has a visit in the next 30 days with authorizing provider.  See chart review.              Passed - Patient is 18 years of age or older              Ajay Faarax  [facility]

## 2017-12-04 RX ORDER — VARENICLINE TARTRATE 1 MG/1
TABLET, FILM COATED ORAL
Qty: 56 TABLET | Refills: 1 | Status: SHIPPED | OUTPATIENT
Start: 2017-12-04 | End: 2018-02-12

## 2017-12-04 NOTE — TELEPHONE ENCOUNTER
Prescription approved per [facility] Refill Protocol.  Kristal Singleton RN

## 2017-12-11 ENCOUNTER — TELEPHONE (OUTPATIENT)
Dept: FAMILY MEDICINE | Facility: CLINIC | Age: 62
End: 2017-12-11

## 2017-12-11 DIAGNOSIS — Z23 NEED FOR ZOSTER VACCINE: Primary | ICD-10-CM

## 2017-12-11 NOTE — TELEPHONE ENCOUNTER
Reason for Call:  Other prescription    Detailed comments: Pt calling to request medicaiton for shingles vaccine    Phone Number Patient can be reached at: Home number on file [phone] (home)    Best Time: anytime    Can we leave a detailed message on this number? YES    Call taken on 12/11/2017 at 3:10 PM by Michael Jonas

## 2017-12-12 NOTE — TELEPHONE ENCOUNTER
Patient contacted. He refused to schedule a nurse visit for Shingles injection only.  He was told by his pharmacy needs a Rx for shingles injection before they will administer at their pharmacy.  Patient is requesting a Rx to be done at his pharmacy only.    Please advise Dr Valladares.  Sami Headley CMA

## 2017-12-16 DIAGNOSIS — E78.5 HYPERLIPIDEMIA LDL GOAL <100: ICD-10-CM

## 2017-12-16 NOTE — TELEPHONE ENCOUNTER
Requested Prescriptions   Pending Prescriptions Disp Refills     pravastatin (PRAVACHOL) 80 MG tablet [Pharmacy Med Name: Pravastatin Sodium Oral Tablet 80 MG] 30 tablet 4    Last Written Prescription Date:  6/15/17  Last Fill Quantity: 30,  # refills: 5   Last Office Visit with [facility], [facility] or [facility] prescribing provider:  6/15/2017     Future Office Visit:      Sig: TAKE 1 TABLET BY MOUTH EVERY EVENING FOR CHOLESTEROL    Statins Protocol Passed    12/16/2017  7:00 AM       Passed - LDL on file in past 12 months    Recent Labs   Lab Test  06/15/17   1808   LDL  56            Passed - No abnormal creatine kinase in past 12 months    No lab results found.         Passed - Recent or future visit with authorizing provider    Patient had office visit in the last year or has a visit in the next 30 days with authorizing provider.  See chart review.              Passed - Patient is age 18 or older

## 2017-12-20 RX ORDER — PRAVASTATIN SODIUM 80 MG/1
TABLET ORAL
Qty: 30 TABLET | Refills: 0 | Status: SHIPPED | OUTPATIENT
Start: 2017-12-20 | End: 2018-02-20

## 2017-12-20 NOTE — TELEPHONE ENCOUNTER
pravastatin (PRAVACHOL) 80 MG tablet  Medication is being filled for 1 time refill only due to:  Future labs ordered fasting lipid panel, per 06/15/2017 note to recheck in 6 months.   Please assist with scheduling.    Iram Jensen RN, BSN

## 2017-12-21 NOTE — TELEPHONE ENCOUNTER
This writer attempted to contact patient on 12/20/17      Reason for call refill and left detailed message needs fasting labs.      Brenda Montano MA

## 2018-01-28 DIAGNOSIS — I10 ESSENTIAL HYPERTENSION WITH GOAL BLOOD PRESSURE LESS THAN 140/90: ICD-10-CM

## 2018-01-28 NOTE — TELEPHONE ENCOUNTER
"Requested Prescriptions   Pending Prescriptions Disp Refills     triamterene-hydrochlorothiazide (MAXZIDE) 75-50 MG per tablet [Pharmacy Med Name: Triamterene-HCTZ Oral Tablet 75-50 MG] 30 tablet 4    Last Written Prescription Date:  6/15/17  Last Fill Quantity: 30,  # refills: 5   Last Office Visit with [facility] or [facility] prescribing provider:  6/15/2017     Future Office Visit:      Sig: TAKE ONE TABLET BY MOUTH EVERY DAY FOR BLOOD PRESSURE    Diuretics (Including Combos) Protocol Passed    1/28/2018  7:00 AM       Passed - Blood pressure under 140/90    BP Readings from Last 3 Encounters:   06/15/17 110/80   12/14/16 156/85   12/08/16 138/80                Passed - Recent or future visit with authorizing provider's specialty    Patient had office visit in the last year or has a visit in the next 30 days with authorizing provider.  See \"Patient Info\" tab in inbasket, or \"Choose Columns\" in Meds & Orders section of the refill encounter.            Passed - Patient is age 18 or older       Passed - Normal serum creatinine on file in past 12 months    Recent Labs   Lab Test  06/15/17   1808   CR  0.98             Passed - Normal serum potassium on file in past 12 months    Recent Labs   Lab Test  06/15/17   1808   POTASSIUM  3.6                   Passed - Normal serum sodium on file in past 12 months    Recent Labs   Lab Test  06/15/17   1808   NA  138                "

## 2018-02-02 RX ORDER — TRIAMTERENE AND HYDROCHLOROTHIAZIDE 75; 50 MG/1; MG/1
TABLET ORAL
Qty: 30 TABLET | Refills: 0 | Status: SHIPPED | OUTPATIENT
Start: 2018-02-02 | End: 2018-02-20

## 2018-02-02 NOTE — TELEPHONE ENCOUNTER
Medication is being filled for 1 time refill only due to:  Patient needs labs and recheck.   Jennifer Way RN

## 2018-02-12 DIAGNOSIS — F17.200 TOBACCO USE DISORDER: ICD-10-CM

## 2018-02-12 NOTE — TELEPHONE ENCOUNTER
"Requested Prescriptions   Pending Prescriptions Disp Refills     CHANTIX CONTINUING MONTH BLOSSOM 1 MG tablet [Pharmacy Med Name: Chantix Continuing Month Blossom Oral Tablet 1 MG]  Last Written Prescription Date:  12/04/17  Last Fill Quantity: 56,  # refills: 1   Last Office Visit with [facility], [facility] or [facility] prescribing provider:  06/15/17   Future Office Visit:    56 tablet 0     Sig: TAKE 1 TABLET BY MOUTH TWICE DAILY    Partial Cholinergic Nicotinic Agonist Agents Passed    2/12/2018 11:33 AM       Passed - Blood pressure less than 140/90    BP Readings from Last 3 Encounters:   06/15/17 110/80   12/14/16 156/85   12/08/16 138/80                Passed - Recent or future visit with authorizing provider's specialty    Patient had office visit in the last year or has a visit in the next 30 days with authorizing provider.  See \"Patient Info\" tab in inbasket, or \"Choose Columns\" in Meds & Orders section of the refill encounter.            Passed - Patient is 18 years of age or older          "

## 2018-02-18 DIAGNOSIS — E78.5 HYPERLIPIDEMIA LDL GOAL <100: ICD-10-CM

## 2018-02-18 RX ORDER — VARENICLINE TARTRATE 1 MG/1
TABLET, FILM COATED ORAL
Qty: 56 TABLET | Refills: 0 | Status: SHIPPED | OUTPATIENT
Start: 2018-02-18 | End: 2018-03-16

## 2018-02-18 RX ORDER — PRAVASTATIN SODIUM 80 MG/1
TABLET ORAL
Qty: 30 TABLET | Refills: 0 | Status: CANCELLED | OUTPATIENT
Start: 2018-02-18

## 2018-02-18 NOTE — TELEPHONE ENCOUNTER
"Requested Prescriptions   Pending Prescriptions Disp Refills     pravastatin (PRAVACHOL) 80 MG tablet [Pharmacy Med Name: Pravastatin Sodium Oral Tablet 80 MG]    Last Written Prescription Date:  12/20/17  Last Fill Quantity: 30,  # refills: 0   Last Office Visit with [facility], [facility] or [facility] prescribing provider:  6/15/17   Future Office Visit:    Next 5 appointments (look out 90 days)     Feb 20, 2018 11:20 AM CST   Office Visit with Kevin Ojeda MD   [facility]    [address] [place] [address]  [address]  [facility] [address]   [phone]                  30 tablet 0     Sig: TAKE 1 TABLET BY MOUTH EVERY EVENING FOR CHOLESTEROL    Statins Protocol Passed    2/18/2018  2:19 PM       Passed - LDL on file in past 12 months    Recent Labs   Lab Test  06/15/17   1808   LDL  56            Passed - No abnormal creatine kinase in past 12 months    No lab results found.         Passed - Recent or future visit with authorizing provider    Patient had office visit in the last year or has a visit in the next 30 days with authorizing provider.  See \"Patient Info\" tab in inbasket, or \"Choose Columns\" in Meds & Orders section of the refill encounter.            Passed - Patient is age 18 or older              Ajay Faarax  [facility]  "

## 2018-02-20 ENCOUNTER — OFFICE VISIT (OUTPATIENT)
Dept: PEDIATRICS | Facility: CLINIC | Age: 63
End: 2018-02-20
Payer: COMMERCIAL

## 2018-02-20 VITALS
WEIGHT: 222 LBS | SYSTOLIC BLOOD PRESSURE: 145 MMHG | TEMPERATURE: 98.2 F | OXYGEN SATURATION: 96 % | BODY MASS INDEX: 36.99 KG/M2 | HEART RATE: 88 BPM | DIASTOLIC BLOOD PRESSURE: 82 MMHG | HEIGHT: 65 IN

## 2018-02-20 DIAGNOSIS — D12.6 TUBULAR ADENOMA OF COLON: ICD-10-CM

## 2018-02-20 DIAGNOSIS — I10 ESSENTIAL HYPERTENSION WITH GOAL BLOOD PRESSURE LESS THAN 140/90: Primary | ICD-10-CM

## 2018-02-20 DIAGNOSIS — E78.5 HYPERLIPIDEMIA LDL GOAL <100: ICD-10-CM

## 2018-02-20 PROCEDURE — 99214 OFFICE O/P EST MOD 30 MIN: CPT | Performed by: INTERNAL MEDICINE

## 2018-02-20 RX ORDER — AMLODIPINE BESYLATE 2.5 MG/1
2.5 TABLET ORAL DAILY
Qty: 30 TABLET | Refills: 1 | Status: SHIPPED | OUTPATIENT
Start: 2018-02-20 | End: 2018-09-26

## 2018-02-20 RX ORDER — TRIAMTERENE AND HYDROCHLOROTHIAZIDE 75; 50 MG/1; MG/1
1 TABLET ORAL DAILY
Qty: 90 TABLET | Refills: 3 | Status: SHIPPED | OUTPATIENT
Start: 2018-02-20 | End: 2019-03-18

## 2018-02-20 RX ORDER — PRAVASTATIN SODIUM 80 MG/1
80 TABLET ORAL DAILY
Qty: 90 TABLET | Refills: 3 | Status: SHIPPED | OUTPATIENT
Start: 2018-02-20 | End: 2019-01-18

## 2018-02-20 NOTE — MR AVS SNAPSHOT
After Visit Summary   2/20/2018    Mayur Sheth    MRN: [id number]           Patient Information     Date Of Birth          1955        Visit Information        Provider Department      2/20/2018 11:20 AM Kevin Ojeda MD [facility]        Today's Diagnoses     Essential hypertension with goal blood pressure less than 140/90    -  1    Hyperlipidemia LDL goal <100        Tubular adenoma of colon          Care Instructions    INSTRUCTIONS FOR TODAY:     start amlodipine 2.5 mg daily, return for nurse BP check in 2 weeks     Dr Ojeda            Follow-ups after your visit        Additional Services     GASTROENTEROLOGY ADULT REF PROCEDURE ONLY       Last Lab Result: Creatinine (mg/dL)       Date                     Value                 06/15/2017               0.98             ----------  Body mass index is 36.66 kg/(m^2).     Needed:  No  Language:  English    Patient will be contacted to schedule procedure.     Please be aware that coverage of these services is subject to the terms and limitations of your health insurance plan.  Call member services at your health plan with any benefit or coverage questions.  Any procedures must be performed at a [facility] facility OR coordinated by your clinic's referral office.    Please bring the following with you to your appointment:    (1) Any X-Rays, CTs or MRIs which have been performed.  Contact the facility where they were done to arrange for  prior to your scheduled appointment.    (2) List of current medications   (3) This referral request   (4) Any documents/labs given to you for this referral                  Who to contact     If you have questions or need follow up information about today's clinic visit or your schedule please contact [facility] directly at [phone].  Normal or non-critical lab and imaging results will be communicated to you by MyChart, letter or phone within 4 business days  "after the clinic has received the results. If you do not hear from us within 7 days, please contact the clinic through VasSol or phone. If you have a critical or abnormal lab result, we will notify you by phone as soon as possible.  Submit refill requests through VasSol or call your pharmacy and they will forward the refill request to us. Please allow 3 business days for your refill to be completed.          Additional Information About Your Visit        VasSol Information     VasSol gives you secure access to your electronic health record. If you see a primary care provider, you can also send messages to your care team and make appointments. If you have questions, please call your primary care clinic.  If you do not have a primary care provider, please call [phone] and they will assist you.        Care EveryWhere ID     This is your Care EveryWhere ID. This could be used by other organizations to access your [place] medical records  [id number]        Your Vitals Were     Pulse Temperature Height Pulse Oximetry BMI (Body Mass Index)       88 98.2  F (36.8  C) (Oral) 5' 5.25\" (1.657 m) 96% 36.66 kg/m2        Blood Pressure from Last 3 Encounters:   02/20/18 145/82   06/15/17 110/80   12/14/16 156/85    Weight from Last 3 Encounters:   02/20/18 222 lb (100.7 kg)   06/15/17 218 lb (98.9 kg)   02/21/17 222 lb 12.8 oz (101.1 kg)              We Performed the Following     GASTROENTEROLOGY ADULT REF PROCEDURE ONLY          Today's Medication Changes          These changes are accurate as of 2/20/18 11:57 AM.  If you have any questions, ask your nurse or doctor.               Start taking these medicines.        Dose/Directions    amLODIPine 2.5 MG tablet   Commonly known as:  NORVASC   Used for:  Essential hypertension with goal blood pressure less than 140/90   Started by:  Kevin Ojeda MD        Dose:  2.5 mg   Take 1 tablet (2.5 mg) by mouth daily   Quantity:  30 tablet   Refills:  1         These " medicines have changed or have updated prescriptions.        Dose/Directions    pravastatin 80 MG tablet   Commonly known as:  PRAVACHOL   This may have changed:  See the new instructions.   Used for:  Hyperlipidemia LDL goal <100   Changed by:  Kevin Ojeda MD        Dose:  80 mg   Take 1 tablet (80 mg) by mouth daily   Quantity:  90 tablet   Refills:  3       triamterene-hydrochlorothiazide 75-50 MG per tablet   Commonly known as:  MAXZIDE   This may have changed:  See the new instructions.   Used for:  Essential hypertension with goal blood pressure less than 140/90   Changed by:  Kevin Ojeda MD        Dose:  1 tablet   Take 1 tablet by mouth daily   Quantity:  90 tablet   Refills:  3            Where to get your medicines      These medications were sent to [place] [id number] [facility], [place] - [address]  [address] [facility] [address], [facility] [address]     Phone:  [phone]     amLODIPine 2.5 MG tablet    pravastatin 80 MG tablet    triamterene-hydrochlorothiazide 75-50 MG per tablet                Primary Care Provider Office Phone # Fax #    Kevin Ojeda [phone]       [facility] [place] [address]        Equal Access to Services     [facility]: Hadii aad ku hadasho Soomaali, waaxda luqadaha, qaybta kaalmada adeegyada, waxay idiin hayenricon geraldo ness . So [facility] [phone].    ATENCIÓN: Si habla español, tiene a thompson disposición servicios gratuitos de asistencia lingüística. [facility] [phone].    We comply with applicable federal civil rights laws and [place] laws. We do not discriminate on the basis of race, color, national origin, age, disability, sex, sexual orientation, or gender identity.            Thank you!     Thank you for choosing [facility]  for your care. Our goal is always to provide you with excellent care. Hearing back from our patients is one way we can continue to improve our services. Please  take a few minutes to complete the written survey that you may receive in the mail after your visit with us. Thank you!             Your Updated Medication List - Protect others around you: Learn how to safely use, store and throw away your medicines at www.disposemymeds.org.          This list is accurate as of 2/20/18 11:57 AM.  Always use your most recent med list.                   Brand Name Dispense Instructions for use Diagnosis    amLODIPine 2.5 MG tablet    NORVASC    30 tablet    Take 1 tablet (2.5 mg) by mouth daily    Essential hypertension with goal blood pressure less than 140/90       aspirin 325 MG EC tablet      1 TABLET DAILY*        CALCIUM 600+D PO      Take by mouth daily        CHANTIX CONTINUING MONTH BLOSSOM 1 MG tablet   Generic drug:  varenicline     56 tablet    TAKE 1 TABLET BY MOUTH TWICE DAILY    Tobacco use disorder       CO Q 10 PO           cyclobenzaprine 10 MG tablet    FLEXERIL    90 tablet    Take 1 tablet (10 mg) by mouth 3 times daily as needed for muscle spasms    LBP (low back pain)       ibuprofen 200 MG capsule      Take 600 mg by mouth 3 times daily as needed for rib pain. Take with food.    Rib pain on right side       Multi-vitamin Tabs tablet   Generic drug:  multivitamin, therapeutic with minerals      1 TABLET DAILY        pravastatin 80 MG tablet    PRAVACHOL    90 tablet    Take 1 tablet (80 mg) by mouth daily    Hyperlipidemia LDL goal <100       PROBIOTIC DAILY PO      Take 0.5 chew tab by mouth daily        ranitidine 75 MG tablet    ZANTAC     1 tablet 2 times daily as needed. for reflux symptoms.        triamterene-hydrochlorothiazide 75-50 MG per tablet    MAXZIDE    90 tablet    Take 1 tablet by mouth daily    Essential hypertension with goal blood pressure less than 140/90

## 2018-02-20 NOTE — PROGRESS NOTES
SUBJECTIVE:                                                    Mayur Sheth is a 63 year old male who presents to clinic today for the following health issues:    Hyperlipidemia Follow-Up      Rate your low fat/cholesterol diet?: fair    Taking statin?  Yes, no muscle aches from statin    Other lipid medications/supplements?:  None  Lab Results   Component Value Date    LDL 56 06/15/2017    LDL 81 12/08/2016    LDL 71 05/10/2016     Hypertension Follow-up      Outpatient blood pressures are not being checked.    Low Salt Diet: no added salt    History of adenomatous colon polyp.  Patient is due for 5 year follow-up colonoscopy.    Patient Active Problem List   Diagnosis     Family history of ischemic heart disease     Hyperlipidemia LDL goal <100     Essential hypertension with goal blood pressure less than 140/90     History of adenomatous polyp of colon     Gastroesophageal reflux disease without esophagitis     Diverticulosis     Obesity, Class II, BMI 35-39.9, with comorbidity     Low back pain     AC (acromioclavicular) joint arthritis     Tubular adenoma of colon     Past Surgical History:   Procedure Laterality Date     ARTHROSCOPY SHLDR ROTATOR CUFF REPAIR, SUBACROMIAL DECOMP, DIST CLAVICLE RESECTION, BICEP TENODESIS Left 12/14/2016    Procedure: ARTHROSCOPY SHOULDER ROTATOR CUFF REPAIR, SUBACROMIAL DECOMPRESSION, DISTAL CLAVICLE RESECTION, OPEN BICEP TENODESIS REPAIR;  Surgeon: Donell Brower MD;  Location: [facility] OR     C REPAIR CRUCIATE LIGAMENT,KNEE  ~1992    LT ACL     TONSILLECTOMY & ADENOIDECTOMY         Social History   Substance Use Topics     Smoking status: Former Smoker     Packs/day: 0.50     Years: 40.00     Types: Cigarettes     Quit date: 2/3/2014     Smokeless tobacco: Never Used      Comment: requit, uses Chantix successfully     Alcohol use 3.6 oz/week     6 Standard drinks or equivalent per week      Comment: 5 beers per month      Family History   Problem Relation Age of Onset      Hypertension Mother      DIABETES Mother      HEART DISEASE Mother      AFib     Myocardial Infarction Mother      CEREBROVASCULAR DISEASE Mother      Heart Failure Mother           Hypertension Father      Myocardial Infarction Father 62          DIABETES Maternal Grandmother      Cancer - colorectal Paternal Grandmother      DIABETES Paternal Grandmother      CEREBROVASCULAR DISEASE Paternal Grandmother      Myocardial Infarction Brother 43     MI, multiple     Psoriasis Sister      Thyroid Disease No family hx of      Glaucoma No family hx of      Macular Degeneration No family hx of      Anesthesia Reaction No family hx of      Thrombophilia No family hx of      Bleeding Disorder No family hx of          Current Outpatient Prescriptions   Medication Sig Dispense Refill     triamterene-hydrochlorothiazide (MAXZIDE) 75-50 MG per tablet Take 1 tablet by mouth daily 90 tablet 3     pravastatin (PRAVACHOL) 80 MG tablet Take 1 tablet (80 mg) by mouth daily 90 tablet 3     amLODIPine (NORVASC) 2.5 MG tablet Take 1 tablet (2.5 mg) by mouth daily 30 tablet 1     CHANTIX CONTINUING MONTH BLOSSOM 1 MG tablet TAKE 1 TABLET BY MOUTH TWICE DAILY 56 tablet 0     ibuprofen 200 MG capsule Take 600 mg by mouth 3 times daily as needed for rib pain. Take with food.       Coenzyme Q10 (CO Q 10 PO)        Probiotic Product (PROBIOTIC DAILY PO) Take 0.5 chew tab by mouth daily       cyclobenzaprine (FLEXERIL) 10 MG tablet Take 1 tablet (10 mg) by mouth 3 times daily as needed for muscle spasms 90 tablet 1     Calcium Carbonate-Vitamin D (CALCIUM 600+D PO) Take by mouth daily        ranitidine (ZANTAC) 75 MG tablet 1 tablet 2 times daily as needed. for reflux symptoms.       ASPIRIN 325 MG OR TBEC 1 TABLET DAILY*       MULTI-VITAMIN OR TABS 1 TABLET DAILY       ROS: The following systems have been completely reviewed and are negative except as noted in the HPI: CONSTITUTIONAL, CARDIOVASCULAR, PULMONARY,  "GASTROINTESTINAL    OBJECTIVE:                                                    /82 (Cuff Size: Adult Large)  Pulse 88  Temp 98.2  F (36.8  C) (Oral)  Ht 5' 5.25\" (1.657 m)  Wt 222 lb (100.7 kg)  SpO2 96%  BMI 36.66 kg/m2 Body mass index is 36.66 kg/(m^2).  GENERAL:  alert,  no distress   NECK: no tenderness, no adenopathy  RESP: lungs clear to auscultation - no rales, no rhonchi, no wheezes  CV: regular rates and rhythm, normal S1 S2, no S3 or S4 and no murmur, no click or rub   MS: extremities- no edema       Component      Latest Ref Rng & Units 6/15/2017   Sodium      133 - 144 mmol/L 138   Potassium      3.4 - 5.3 mmol/L 3.6   Chloride      94 - 109 mmol/L 100   Carbon Dioxide      20 - 32 mmol/L 30   Anion Gap      3 - 14 mmol/L 8   Glucose      70 - 99 mg/dL 94   Urea Nitrogen      7 - 30 mg/dL 14   Creatinine      0.66 - 1.25 mg/dL 0.98   GFR Estimate      >60 mL/min/1.7m2 78   GFR Estimate If Black      >60 mL/min/1.7m2 >90 . . .   Calcium      8.5 - 10.1 mg/dL 8.9       ASSESSMENT/PLAN:                                                        ICD-10-CM    1. Essential hypertension with goal blood pressure less than 140/90 I10 triamterene-hydrochlorothiazide (MAXZIDE) 75-50 MG per tablet     amLODIPine (NORVASC) 2.5 MG tablet     Hypertension follow-up.  Inadequate control.  Continue Maxide.  Add amlodipine 2.5 mg-side effects reviewed.  Return for blood pressure recheck 2 weeks     2. Hyperlipidemia LDL goal <100 E78.5 pravastatin (PRAVACHOL) 80 MG tablet     Adequate control.  Continue current regimen without changes.      3. Tubular adenoma of colon D12.6 GASTROENTEROLOGY ADULT REF PROCEDURE ONLY     Referred for colonoscopy        Kevin Ojeda MD  [facility]     "

## 2018-02-20 NOTE — PATIENT INSTRUCTIONS
INSTRUCTIONS FOR TODAY:     start amlodipine 2.5 mg daily, return for nurse BP check in 2 weeks     Dr Ojeda

## 2018-03-06 ENCOUNTER — ALLIED HEALTH/NURSE VISIT (OUTPATIENT)
Dept: NURSING | Facility: CLINIC | Age: 63
End: 2018-03-06
Payer: COMMERCIAL

## 2018-03-06 VITALS — SYSTOLIC BLOOD PRESSURE: 150 MMHG | HEART RATE: 86 BPM | DIASTOLIC BLOOD PRESSURE: 80 MMHG

## 2018-03-06 DIAGNOSIS — I10 ESSENTIAL HYPERTENSION WITH GOAL BLOOD PRESSURE LESS THAN 140/90: Primary | ICD-10-CM

## 2018-03-06 PROCEDURE — 99207 ZZC NO CHARGE NURSE ONLY: CPT

## 2018-03-06 NOTE — MR AVS SNAPSHOT
After Visit Summary   3/6/2018    Mayur Sheth    MRN: [id number]           Patient Information     Date Of Birth          1955        Visit Information        Provider Department      3/6/2018 11:30 AM [facility] NURSE [facility]        Today's Diagnoses     Essential hypertension with goal blood pressure less than 140/90    -  1       Follow-ups after your visit        Who to contact     If you have questions or need follow up information about today's clinic visit or your schedule please contact [facility] directly at [phone].  Normal or non-critical lab and imaging results will be communicated to you by OpenSpanhart, letter or phone within 4 business days after the clinic has received the results. If you do not hear from us within 7 days, please contact the clinic through Highlightt or phone. If you have a critical or abnormal lab result, we will notify you by phone as soon as possible.  Submit refill requests through [employer] or call your pharmacy and they will forward the refill request to us. Please allow 3 business days for your refill to be completed.          Additional Information About Your Visit        MyChart Information     [employer] gives you secure access to your electronic health record. If you see a primary care provider, you can also send messages to your care team and make appointments. If you have questions, please call your primary care clinic.  If you do not have a primary care provider, please call [phone] and they will assist you.        Care EveryWhere ID     This is your Care EveryWhere ID. This could be used by other organizations to access your [place] medical records  [id number]        Your Vitals Were     Pulse                   86            Blood Pressure from Last 3 Encounters:   03/06/18 150/80   02/20/18 145/82   06/15/17 110/80    Weight from Last 3 Encounters:   02/20/18 222 lb (100.7 kg)   06/15/17 218 lb (98.9 kg)   02/21/17 222 lb  12.8 oz (101.1 kg)              Today, you had the following     No orders found for display       Primary Care Provider Office Phone # Fax #    Kevin Ojeda [phone] [address] [place] [address]        Equal Access to Services     [facility]: Hadii aad ku hadasho Soomaali, waaxda luqadaha, qaybta kaalmada adeegyada, waxay idiin hayaan adeeg femi lanailaboni ah. So [facility] [phone].    ATENCIÓN: Si habla español, tiene a thompson disposición servicios gratuitos de asistencia lingüística. Llame al [phone].    We comply with applicable federal civil rights laws and [place] laws. We do not discriminate on the basis of race, color, national origin, age, disability, sex, sexual orientation, or gender identity.            Thank you!     Thank you for choosing [facility]  for your care. Our goal is always to provide you with excellent care. Hearing back from our patients is one way we can continue to improve our services. Please take a few minutes to complete the written survey that you may receive in the mail after your visit with us. Thank you!             Your Updated Medication List - Protect others around you: Learn how to safely use, store and throw away your medicines at www.disposemymeds.org.          This list is accurate as of 3/6/18 11:51 AM.  Always use your most recent med list.                   Brand Name Dispense Instructions for use Diagnosis    amLODIPine 2.5 MG tablet    NORVASC    30 tablet    Take 1 tablet (2.5 mg) by mouth daily    Essential hypertension with goal blood pressure less than 140/90       aspirin 325 MG EC tablet      1 TABLET DAILY*        CALCIUM 600+D PO      Take by mouth daily        CHANTIX CONTINUING MONTH BLOSSOM 1 MG tablet   Generic drug:  varenicline     56 tablet    TAKE 1 TABLET BY MOUTH TWICE DAILY    Tobacco use disorder       CO Q 10 PO           cyclobenzaprine 10 MG tablet    FLEXERIL    90 tablet    Take 1  tablet (10 mg) by mouth 3 times daily as needed for muscle spasms    LBP (low back pain)       ibuprofen 200 MG capsule      Take 600 mg by mouth 3 times daily as needed for rib pain. Take with food.    Rib pain on right side       Multi-vitamin Tabs tablet   Generic drug:  multivitamin, therapeutic with minerals      1 TABLET DAILY        pravastatin 80 MG tablet    PRAVACHOL    90 tablet    Take 1 tablet (80 mg) by mouth daily    Hyperlipidemia LDL goal <100       PROBIOTIC DAILY PO      Take 0.5 chew tab by mouth daily        ranitidine 75 MG tablet    ZANTAC     1 tablet 2 times daily as needed. for reflux symptoms.        triamterene-hydrochlorothiazide 75-50 MG per tablet    MAXZIDE    90 tablet    Take 1 tablet by mouth daily    Essential hypertension with goal blood pressure less than 140/90

## 2018-03-06 NOTE — NURSING NOTE
Mayur Sheth is a 63 year old male who comes in today for a Blood Pressure check because of added medication.    *Document pulse and BP  *Use new set of vitals button for multiple readings.  *Use extended vitals for orthostatic    Vitals as recorded, a large cuff was used.    Patient is taking medication as prescribed  Patient is tolerating medications well.  Patient is not monitoring Blood Pressure at home.  Average readings if yes are n/a    Current complaints: none    Disposition: results routed to MD/AP  BP Readings from Last 3 Encounters:   03/06/18 150/80   02/20/18 145/82   06/15/17 110/80     Pt made aware that bp reading was elevated, pt refused to wait 5 min rest for recheck, pt refused to wait while I spoke with MD or RN regarding reading, states he has a ride waiting on him and also states that currently he is not feeling well per him has fever and cough so that could be contributing to elevated bp level, states he will call and reschedule nurse only visit next week when feeling better to have bp rechecked..Marzena DESOUZA MA

## 2018-03-07 ENCOUNTER — TELEPHONE (OUTPATIENT)
Dept: PEDIATRICS | Facility: CLINIC | Age: 63
End: 2018-03-07

## 2018-03-07 NOTE — TELEPHONE ENCOUNTER
This writer called patient to advise of below encounter, pt decline to increase or change BP  medication at this time stated that he is under the weather and believe that is the reason for high reading.     //Allyson Dc MA// March 7, 2018 3:38 PM

## 2018-03-07 NOTE — TELEPHONE ENCOUNTER
Please call pt  Recent nurse BP check /80  Inadequate control, please have pt increase amlodipine to 5mg daily      (can take 2 of the 2.5mg tabs once daily)  Continue triamterene/HCTZ   Return for nurse BP recheck in 1-2 weeks

## 2018-03-16 ENCOUNTER — TELEPHONE (OUTPATIENT)
Dept: PEDIATRICS | Facility: CLINIC | Age: 63
End: 2018-03-16

## 2018-03-16 DIAGNOSIS — F17.200 TOBACCO USE DISORDER: ICD-10-CM

## 2018-03-16 DIAGNOSIS — I10 ESSENTIAL HYPERTENSION WITH GOAL BLOOD PRESSURE LESS THAN 140/90: ICD-10-CM

## 2018-03-17 NOTE — TELEPHONE ENCOUNTER
"Requested Prescriptions   Pending Prescriptions Disp Refills     CHANTIX CONTINUING MONTH BLOSSOM 1 MG tablet [Pharmacy Med Name: Chantix Continuing Month Blossom Oral Tablet 1 MG] 56 tablet 0    Last Written Prescription Date:  2/18/18  Last Fill Quantity: 56,  # refills: 0   Last Office Visit with RICHARD, JD or [facility] prescribing provider:  6/15/2017     Future Office Visit:      Sig: TAKE ONE TABLET BY MOUTH TWICE DAILY    Partial Cholinergic Nicotinic Agonist Agents Failed    3/16/2018  2:38 PM       Failed - Blood pressure under 140/90 in past 12 months    BP Readings from Last 3 Encounters:   03/06/18 150/80   02/20/18 145/82   06/15/17 110/80                Passed - Recent (12 mo) or future (30 days) visit within the authorizing provider's specialty    Patient had office visit in the last 12 months or has a visit in the next 30 days with authorizing provider or within the authorizing provider's specialty.  See \"Patient Info\" tab in inbasket, or \"Choose Columns\" in Meds & Orders section of the refill encounter.           Passed - Patient is 18 years of age or older          "

## 2018-03-19 RX ORDER — AMLODIPINE BESYLATE 2.5 MG/1
2.5 TABLET ORAL DAILY
Qty: 30 TABLET | Refills: 1 | Status: CANCELLED | OUTPATIENT
Start: 2018-03-19

## 2018-03-19 NOTE — TELEPHONE ENCOUNTER
Patient calling he is feeling better now and would like to find out what dosing he should be doing now? Please call patient back.

## 2018-03-19 NOTE — TELEPHONE ENCOUNTER
Routing refill request to provider for review/approval because:  Last 2 blood pressures greater than 140/90.  Almaz Torres RN

## 2018-03-19 NOTE — TELEPHONE ENCOUNTER
Please call.  Current rx Maxzide (75/50) once daily, amlodipine 2.5mg daily  Please have pt return for nurse BP check.  If BP still elevated would increase amlodipine

## 2018-03-20 ENCOUNTER — TELEPHONE (OUTPATIENT)
Dept: PEDIATRICS | Facility: CLINIC | Age: 63
End: 2018-03-20

## 2018-03-20 DIAGNOSIS — I10 ESSENTIAL HYPERTENSION WITH GOAL BLOOD PRESSURE LESS THAN 140/90: Primary | ICD-10-CM

## 2018-03-20 RX ORDER — AMLODIPINE BESYLATE 5 MG/1
5 TABLET ORAL DAILY
Qty: 90 TABLET | Refills: 3 | Status: SHIPPED | OUTPATIENT
Start: 2018-03-20 | End: 2019-03-24

## 2018-03-20 NOTE — TELEPHONE ENCOUNTER
Discussed with pt  Has been taking amlodipine 5mg and Maxzide 75/50 once daily  Home readings: 145/80-90  Ran out of amlodipine    Script for amlodipine 5mg sent'  Pt will check BP over the next few days and call in readings  If still above 140/90, discussed increasing norvasc to 10mg daily

## 2018-03-20 NOTE — TELEPHONE ENCOUNTER
Tried to give the info to patient from . He said he didn't know anything about the amlodipine and needed a refill of the maxzide. See office visit of 2/20/18. Patient got frustrated and hung up.  Called [facility] pharmacy and his wife picked up all the prescriptions 2/28/18. Unsure if the patient is taking the medications. He was very rude and difficult on the phone. Will wait for him to call back.  Emir, CMA

## 2018-03-20 NOTE — TELEPHONE ENCOUNTER
Pharmacy is calling.  Patient presented to pharmacy today and told them that he had increased the dose of Amlodipine to 5 mg.  Pharmacy needs an updated RX.  Order pended.  Please see phone encounter of 03/16.  ANABEL Mccarthy RN

## 2018-03-22 RX ORDER — VARENICLINE TARTRATE 1 MG/1
1 TABLET, FILM COATED ORAL 2 TIMES DAILY
Qty: 60 TABLET | Refills: 0 | Status: SHIPPED | OUTPATIENT
Start: 2018-03-22 | End: 2019-10-21

## 2018-04-23 ENCOUNTER — TRANSFERRED RECORDS (OUTPATIENT)
Dept: HEALTH INFORMATION MANAGEMENT | Facility: CLINIC | Age: 63
End: 2018-04-23

## 2018-09-26 ENCOUNTER — OFFICE VISIT (OUTPATIENT)
Dept: PEDIATRICS | Facility: CLINIC | Age: 63
End: 2018-09-26
Payer: COMMERCIAL

## 2018-09-26 VITALS
BODY MASS INDEX: 36.4 KG/M2 | DIASTOLIC BLOOD PRESSURE: 74 MMHG | HEIGHT: 65 IN | OXYGEN SATURATION: 94 % | SYSTOLIC BLOOD PRESSURE: 132 MMHG | WEIGHT: 218.5 LBS | TEMPERATURE: 98.6 F | HEART RATE: 82 BPM

## 2018-09-26 DIAGNOSIS — K21.9 GASTROESOPHAGEAL REFLUX DISEASE WITHOUT ESOPHAGITIS: ICD-10-CM

## 2018-09-26 DIAGNOSIS — Z23 NEED FOR TETANUS BOOSTER: ICD-10-CM

## 2018-09-26 DIAGNOSIS — E78.5 HYPERLIPIDEMIA LDL GOAL <100: ICD-10-CM

## 2018-09-26 DIAGNOSIS — Z12.5 SCREENING FOR PROSTATE CANCER: ICD-10-CM

## 2018-09-26 DIAGNOSIS — Z00.00 ENCOUNTER FOR ROUTINE ADULT HEALTH EXAMINATION WITHOUT ABNORMAL FINDINGS: Primary | ICD-10-CM

## 2018-09-26 DIAGNOSIS — F17.200 TOBACCO USE DISORDER: ICD-10-CM

## 2018-09-26 DIAGNOSIS — I10 ESSENTIAL HYPERTENSION WITH GOAL BLOOD PRESSURE LESS THAN 140/90: ICD-10-CM

## 2018-09-26 DIAGNOSIS — D12.6 TUBULAR ADENOMA OF COLON: ICD-10-CM

## 2018-09-26 DIAGNOSIS — Z23 NEED FOR PROPHYLACTIC VACCINATION AND INOCULATION AGAINST INFLUENZA: ICD-10-CM

## 2018-09-26 DIAGNOSIS — E66.01 MORBID OBESITY (H): ICD-10-CM

## 2018-09-26 LAB
ALBUMIN SERPL-MCNC: 3.8 G/DL (ref 3.4–5)
ALP SERPL-CCNC: 87 U/L (ref 40–150)
ALT SERPL W P-5'-P-CCNC: 36 U/L (ref 0–70)
ANION GAP SERPL CALCULATED.3IONS-SCNC: 6 MMOL/L (ref 3–14)
AST SERPL W P-5'-P-CCNC: 21 U/L (ref 0–45)
BILIRUB SERPL-MCNC: 0.4 MG/DL (ref 0.2–1.3)
BUN SERPL-MCNC: 12 MG/DL (ref 7–30)
CALCIUM SERPL-MCNC: 8.8 MG/DL (ref 8.5–10.1)
CHLORIDE SERPL-SCNC: 102 MMOL/L (ref 94–109)
CHOLEST SERPL-MCNC: 134 MG/DL
CO2 SERPL-SCNC: 31 MMOL/L (ref 20–32)
CREAT SERPL-MCNC: 0.93 MG/DL (ref 0.66–1.25)
GFR SERPL CREATININE-BSD FRML MDRD: 83 ML/MIN/1.7M2
GLUCOSE SERPL-MCNC: 101 MG/DL (ref 70–99)
HDLC SERPL-MCNC: 41 MG/DL
LDLC SERPL CALC-MCNC: 55 MG/DL
NONHDLC SERPL-MCNC: 93 MG/DL
POTASSIUM SERPL-SCNC: 3.9 MMOL/L (ref 3.4–5.3)
PROT SERPL-MCNC: 7.1 G/DL (ref 6.8–8.8)
PSA SERPL-ACNC: 2.6 UG/L (ref 0–4)
SODIUM SERPL-SCNC: 139 MMOL/L (ref 133–144)
TRIGL SERPL-MCNC: 191 MG/DL

## 2018-09-26 PROCEDURE — 80061 LIPID PANEL: CPT | Performed by: INTERNAL MEDICINE

## 2018-09-26 PROCEDURE — 90471 IMMUNIZATION ADMIN: CPT | Performed by: INTERNAL MEDICINE

## 2018-09-26 PROCEDURE — 90682 RIV4 VACC RECOMBINANT DNA IM: CPT | Performed by: INTERNAL MEDICINE

## 2018-09-26 PROCEDURE — 36415 COLL VENOUS BLD VENIPUNCTURE: CPT | Performed by: INTERNAL MEDICINE

## 2018-09-26 PROCEDURE — 90714 TD VACC NO PRESV 7 YRS+ IM: CPT | Performed by: INTERNAL MEDICINE

## 2018-09-26 PROCEDURE — 90472 IMMUNIZATION ADMIN EACH ADD: CPT | Performed by: INTERNAL MEDICINE

## 2018-09-26 PROCEDURE — 80053 COMPREHEN METABOLIC PANEL: CPT | Performed by: INTERNAL MEDICINE

## 2018-09-26 PROCEDURE — G0103 PSA SCREENING: HCPCS | Performed by: INTERNAL MEDICINE

## 2018-09-26 PROCEDURE — 99396 PREV VISIT EST AGE 40-64: CPT | Mod: 25 | Performed by: INTERNAL MEDICINE

## 2018-09-26 RX ORDER — VARENICLINE TARTRATE 1 MG/1
1 TABLET, FILM COATED ORAL 2 TIMES DAILY
Qty: 60 TABLET | Refills: 0 | Status: CANCELLED | OUTPATIENT
Start: 2018-09-26

## 2018-09-26 RX ORDER — VARENICLINE TARTRATE 1 MG/1
1 TABLET, FILM COATED ORAL 2 TIMES DAILY
Qty: 56 TABLET | Refills: 2 | Status: SHIPPED | OUTPATIENT
Start: 2018-09-26 | End: 2019-01-02

## 2018-09-26 ASSESSMENT — ENCOUNTER SYMPTOMS
PARESTHESIAS: 0
CONSTIPATION: 0
HEADACHES: 0
DIZZINESS: 0
PALPITATIONS: 0
FEVER: 0
SORE THROAT: 0
SHORTNESS OF BREATH: 0
HEARTBURN: 1
JOINT SWELLING: 0
HEMATOCHEZIA: 0
WEAKNESS: 0
EYE PAIN: 0
NERVOUS/ANXIOUS: 0
FREQUENCY: 0
HEMATURIA: 0
DIARRHEA: 0
DYSURIA: 0
CHILLS: 0
NAUSEA: 0
ARTHRALGIAS: 1
MYALGIAS: 0
ABDOMINAL PAIN: 0
COUGH: 0

## 2018-09-26 NOTE — PATIENT INSTRUCTIONS
INSTRUCTIONS FOR TODAY:     set your quit date!   Start chantix    call if you would like to proceed with low dose lung CT scan to screen for lung cancer     Dr Ojeda    Preventive Health Recommendations  Male Ages 50 - 64    Yearly exam:             See your health care provider every year in order to  o   Review health changes.   o   Discuss preventive care.    o   Review your medicines if your doctor has prescribed any.     Have a cholesterol test every 5 years, or more frequently if you are at risk for high cholesterol/heart disease.     Have a diabetes test (fasting glucose) every three years. If you are at risk for diabetes, you should have this test more often.     Have a colonoscopy at age 50, or have a yearly FIT test (stool test). These exams will check for colon cancer.      Talk with your health care provider about whether or not a prostate cancer screening test (PSA) is right for you.    You should be tested each year for STDs (sexually transmitted diseases), if you re at risk.     Shots: Get a flu shot each year. Get a tetanus shot every 10 years.     Nutrition:    Eat at least 5 servings of fruits and vegetables daily.     Eat whole-grain bread, whole-wheat pasta and brown rice instead of white grains and rice.     Get adequate Calcium and Vitamin D.     Lifestyle    Exercise for at least 150 minutes a week (30 minutes a day, 5 days a week). This will help you control your weight and prevent disease.     Limit alcohol to one drink per day.     No smoking.     Wear sunscreen to prevent skin cancer.     See your dentist every six months for an exam and cleaning.     See your eye doctor every 1 to 2 years.

## 2018-09-26 NOTE — MR AVS SNAPSHOT
After Visit Summary   9/26/2018    Mayur Sheth    MRN: [id number]           Patient Information     Date Of Birth          1955        Visit Information        Provider Department      9/26/2018 7:40 AM Kevin Ojeda MD [facility]        Today's Diagnoses     Encounter for routine adult health examination without abnormal findings    -  1    Tobacco use disorder        Hyperlipidemia LDL goal <100        Essential hypertension with goal blood pressure less than 140/90        Gastroesophageal reflux disease without esophagitis        Morbid obesity (H)        Tubular adenoma of colon        Need for prophylactic vaccination and inoculation against influenza        Need for tetanus booster          Care Instructions    INSTRUCTIONS FOR TODAY:     set your quit date!   Start chantix    call if you would like to proceed with low dose lung CT scan to screen for lung cancer     Dr Ojeda    Preventive Health Recommendations  Male Ages 50 - 64    Yearly exam:             See your health care provider every year in order to  o   Review health changes.   o   Discuss preventive care.    o   Review your medicines if your doctor has prescribed any.     Have a cholesterol test every 5 years, or more frequently if you are at risk for high cholesterol/heart disease.     Have a diabetes test (fasting glucose) every three years. If you are at risk for diabetes, you should have this test more often.     Have a colonoscopy at age 50, or have a yearly FIT test (stool test). These exams will check for colon cancer.      Talk with your health care provider about whether or not a prostate cancer screening test (PSA) is right for you.    You should be tested each year for STDs (sexually transmitted diseases), if you re at risk.     Shots: Get a flu shot each year. Get a tetanus shot every 10 years.     Nutrition:    Eat at least 5 servings of fruits and vegetables daily.     Eat whole-grain bread,  "whole-wheat pasta and brown rice instead of white grains and rice.     Get adequate Calcium and Vitamin D.     Lifestyle    Exercise for at least 150 minutes a week (30 minutes a day, 5 days a week). This will help you control your weight and prevent disease.     Limit alcohol to one drink per day.     No smoking.     Wear sunscreen to prevent skin cancer.     See your dentist every six months for an exam and cleaning.     See your eye doctor every 1 to 2 years.            Follow-ups after your visit        Who to contact     If you have questions or need follow up information about today's clinic visit or your schedule please contact [facility] directly at [phone].  Normal or non-critical lab and imaging results will be communicated to you by [employer], letter or phone within 4 business days after the clinic has received the results. If you do not hear from us within 7 days, please contact the clinic through i[employer] or phone. If you have a critical or abnormal lab result, we will notify you by phone as soon as possible.  Submit refill requests through [employer] or call your pharmacy and they will forward the refill request to us. Please allow 3 business days for your refill to be completed.          Additional Information About Your Visit        Tracsis Information     [employer] gives you secure access to your electronic health record. If you see a primary care provider, you can also send messages to your care team and make appointments. If you have questions, please call your primary care clinic.  If you do not have a primary care provider, please call [phone] and they will assist you.        Care EveryWhere ID     This is your Care EveryWhere ID. This could be used by other organizations to access your [place] medical records  [id number]        Your Vitals Were     Pulse Temperature Height Pulse Oximetry BMI (Body Mass Index)       82 98.6  F (37  C) (Oral) 5' 5.25\" (1.657 m) 94% 36.08 kg/m2  "       Blood Pressure from Last 3 Encounters:   09/26/18 132/74   03/06/18 150/80   02/20/18 145/82    Weight from Last 3 Encounters:   09/26/18 218 lb 8 oz (99.1 kg)   02/20/18 222 lb (100.7 kg)   06/15/17 218 lb (98.9 kg)              We Performed the Following     FLU VACCINE, (RIV4) RECOMBINANT HA  , IM (FluBlok, egg free) [24850]- >18 YRS (FMG recommended  50-64 YRS)     TD PRESERV FREE, IM (7+ YRS)     Vaccine Administration, Initial [74033]          Today's Medication Changes          These changes are accurate as of 9/26/18  8:27 AM.  If you have any questions, ask your nurse or doctor.               These medicines have changed or have updated prescriptions.        Dose/Directions    amLODIPine 5 MG tablet   Commonly known as:  NORVASC   This may have changed:  Another medication with the same name was removed. Continue taking this medication, and follow the directions you see here.   Used for:  Essential hypertension with goal blood pressure less than 140/90   Changed by:  Kevin Ojeda MD        Dose:  5 mg   Take 1 tablet (5 mg) by mouth daily   Quantity:  90 tablet   Refills:  3       * varenicline 1 MG tablet   Commonly known as:  CHANTIX CONTINUING MONTH BLOSSOM   This may have changed:  Another medication with the same name was added. Make sure you understand how and when to take each.   Used for:  Tobacco use disorder   Changed by:  Kevin Ojeda MD        Dose:  1 mg   Take 1 tablet (1 mg) by mouth 2 times daily   Quantity:  60 tablet   Refills:  0       * varenicline 0.5 MG X 11 & 1 MG X 42 tablet   Commonly known as:  CHANTIX STARTING MONTH BLOSSOM   This may have changed:  You were already taking a medication with the same name, and this prescription was added. Make sure you understand how and when to take each.   Used for:  Tobacco use disorder   Changed by:  Kevin Ojeda MD        Take 0.5 mg tab daily for 3 days, then 0.5 mg tab twice daily for 4 days, then 1 mg twice daily.    Quantity:  53 tablet   Refills:  0       * varenicline 1 MG tablet   Commonly known as:  CHANTIX   This may have changed:  You were already taking a medication with the same name, and this prescription was added. Make sure you understand how and when to take each.   Used for:  Tobacco use disorder   Changed by:  Kevin Ojeda MD        Dose:  1 mg   Take 1 tablet (1 mg) by mouth 2 times daily   Quantity:  56 tablet   Refills:  2       * Notice:  This list has 3 medication(s) that are the same as other medications prescribed for you. Read the directions carefully, and ask your doctor or other care provider to review them with you.         Where to get your medicines      These medications were sent to [facility], [place] - [address]  [address], [facility] [address]     Phone:  [phone]     varenicline 0.5 MG X 11 & 1 MG X 42 tablet    varenicline 1 MG tablet                Primary Care Provider Office Phone # Fax #    Kevin Ojeda [phone]       [facility] [place] [address]        Equal Access to Services     [facility]: Hadii aad ku hadasho Soomaali, waaxda luqadaha, qaybta kaalmada adeegyada, waxay kd ness . So [facility] [phone].    ATENCIÓN: Si habla español, tiene a thompson disposición servicios gratuitos de asistencia lingüística. [facility] [phone].    We comply with applicable federal civil rights laws and [place] laws. We do not discriminate on the basis of race, color, national origin, age, disability, sex, sexual orientation, or gender identity.            Thank you!     [facility]  [facility] your care. Our goal is always to provide you with excellent care. Hearing back from our patients is one way we can continue to improve our services. Please take a few minutes to complete the written survey that you may receive in the mail after your visit with  us. Thank you!             Your Updated Medication List - Protect others around you: Learn how to safely use, store and throw away your medicines at www.disposemymeds.org.          This list is accurate as of 9/26/18  8:27 AM.  Always use your most recent med list.                   Brand Name Dispense Instructions for use Diagnosis    amLODIPine 5 MG tablet    NORVASC    90 tablet    Take 1 tablet (5 mg) by mouth daily    Essential hypertension with goal blood pressure less than 140/90       aspirin 325 MG EC tablet      1 TABLET DAILY*        CALCIUM 600+D PO      Take by mouth daily        CO Q 10 PO           cyclobenzaprine 10 MG tablet    FLEXERIL    90 tablet    Take 1 tablet (10 mg) by mouth 3 times daily as needed for muscle spasms    LBP (low back pain)       ibuprofen 200 MG capsule      Take 600 mg by mouth 3 times daily as needed for rib pain. Take with food.    Rib pain on right side       Multi-vitamin Tabs tablet   Generic drug:  multivitamin, therapeutic with minerals      1 TABLET DAILY        pravastatin 80 MG tablet    PRAVACHOL    90 tablet    Take 1 tablet (80 mg) by mouth daily    Hyperlipidemia LDL goal <100       PROBIOTIC DAILY PO      Take 0.5 chew tab by mouth daily        ranitidine 75 MG tablet    ZANTAC     1 tablet 2 times daily as needed. for reflux symptoms.        triamterene-hydrochlorothiazide 75-50 MG per tablet    MAXZIDE    90 tablet    Take 1 tablet by mouth daily    Essential hypertension with goal blood pressure less than 140/90       * varenicline 1 MG tablet    CHANTIX CONTINUING MONTH BLOSSOM    60 tablet    Take 1 tablet (1 mg) by mouth 2 times daily    Tobacco use disorder       * varenicline 0.5 MG X 11 & 1 MG X 42 tablet    CHANTIX STARTING MONTH BLOSSOM    53 tablet    Take 0.5 mg tab daily for 3 days, then 0.5 mg tab twice daily for 4 days, then 1 mg twice daily.    Tobacco use disorder       * varenicline 1 MG tablet    CHANTIX    56 tablet    Take 1 tablet (1 mg) by  mouth 2 times daily    Tobacco use disorder       * Notice:  This list has 3 medication(s) that are the same as other medications prescribed for you. Read the directions carefully, and ask your doctor or other care provider to review them with you.

## 2018-09-26 NOTE — PROGRESS NOTES
SUBJECTIVE:   CC: Mayur Sheth is an 63 year old male who presents for preventative health visit.     Physical   Annual:     Getting at least 3 servings of Calcium per day:  Yes    Bi-annual eye exam:  Yes    Dental care twice a year:  Yes    Sleep apnea or symptoms of sleep apnea:  None    Diet:  Regular (no restrictions)    Frequency of exercise:  1 day/week    Duration of exercise:  Less than 15 minutes    Taking medications regularly:  Yes    Additional concerns today:  No      Today's PHQ-2 Score:   PHQ-2 ( 1999 Pfizer) 9/26/2018   Q1: Little interest or pleasure in doing things 0   Q2: Feeling down, depressed or hopeless 0   PHQ-2 Score 0   Q1: Little interest or pleasure in doing things Not at all   Q2: Feeling down, depressed or hopeless Not at all   PHQ-2 Score 0     Abuse: Current or Past(Physical, Sexual or Emotional)- No  Do you feel safe in your environment - Yes    Social History   Substance Use Topics     Smoking status: Current Every Day Smoker     Packs/day: 1.00     Years: 45.00     Types: Cigarettes     Smokeless tobacco: Never Used      Comment: requit, uses Chantix successfully     Alcohol use 3.6 oz/week     6 Standard drinks or equivalent per week      Comment: 5 beers per month      Alcohol Use 9/26/2018   If you drink alcohol do you typically have greater than 3 drinks per day OR greater than 7 drinks per week? No   No flowsheet data found.    Last PSA:   PSA   Date Value Ref Range Status   10/05/2010 1.02 0 - 4 ug/L Final       Reviewed orders with patient. Reviewed health maintenance and updated orders accordingly - Yes    Reviewed and updated as needed this visit by clinical staff  Tobacco  Allergies  Meds  Med Hx  Surg Hx  Fam Hx  Soc Hx      Reviewed and updated as needed this visit by Provider        Patient Active Problem List   Diagnosis     Family history of ischemic heart disease     Hyperlipidemia LDL goal <100     Essential hypertension with goal blood pressure less  than 140/90     Gastroesophageal reflux disease without esophagitis     Diverticulosis     Obesity, Class II, BMI 35-39.9, with comorbidity     Low back pain     AC (acromioclavicular) joint arthritis     Tubular adenoma of colon     Obesity (BMI 35.0-39.9) with comorbidity (H)     Past Medical History:   Diagnosis Date     AC (acromioclavicular) joint arthritis 12/1/2016     Bee sting allergies      Complete tear of left rotator cuff 12/1/2016     Diverticulosis      Essential hypertension, benign 2006     GERD (gastroesophageal reflux disease)      Hyperlipidemia LDL goal <100     Fam Hx CAD     Obesity, Class II, BMI 35-39.9, with comorbidity 2/14/2013     Rotator cuff arthropathy, left      S/P rotator cuff repair 12/14/16    left     Tubular adenoma 3/24/08       Past Surgical History:   Procedure Laterality Date     ARTHROSCOPY SHLDR ROTATOR CUFF REPAIR, SUBACROMIAL DECOMP, DIST CLAVICLE RESECTION, BICEP TENODESIS Left 12/14/2016    Procedure: ARTHROSCOPY SHOULDER ROTATOR CUFF REPAIR, SUBACROMIAL DECOMPRESSION, DISTAL CLAVICLE RESECTION, OPEN BICEP TENODESIS REPAIR;  Surgeon: Donell Brower MD;  Location: [facility] OR     C REPAIR CRUCIATE LIGAMENT,KNEE  ~1992    LT ACL     TONSILLECTOMY & ADENOIDECTOMY         Family History   Problem Relation Age of Onset     Hypertension Mother      Diabetes Mother      HEART DISEASE Mother      AFib     Myocardial Infarction Mother      Cerebrovascular Disease Mother        ALLERGIES     Allergies   Allergen Reactions     No Clinical Screening - See Comments Anaphylaxis     Detergents,        Bee      Morphine          Review of Systems   Constitutional: Negative for chills and fever.   HENT: Positive for congestion. Negative for ear pain, hearing loss and sore throat.    Eyes: Negative for pain and visual disturbance.   Respiratory: Negative for cough and shortness of breath.    Cardiovascular: Negative for chest pain, palpitations and peripheral edema.    Gastrointestinal: Positive for heartburn. Negative for abdominal pain, constipation, diarrhea, hematochezia and nausea.   Genitourinary: Negative for discharge, dysuria, frequency, genital sores, hematuria, impotence and urgency.   Musculoskeletal: Positive for arthralgias. Negative for joint swelling and myalgias.   Skin: Negative for rash.   Neurological: Negative for dizziness, weakness, headaches and paresthesias.   Psychiatric/Behavioral: Negative for mood changes. The patient is not nervous/anxious.      Current Outpatient Prescriptions   Medication Sig Dispense Refill     amLODIPine (NORVASC) 5 MG tablet Take 1 tablet (5 mg) by mouth daily 90 tablet 3     ASPIRIN 325 MG OR TBEC 1 TABLET DAILY*       Calcium Carbonate-Vitamin D (CALCIUM 600+D PO) Take by mouth daily        Coenzyme Q10 (CO Q 10 PO)        cyclobenzaprine (FLEXERIL) 10 MG tablet Take 1 tablet (10 mg) by mouth 3 times daily as needed for muscle spasms 90 tablet 1     ibuprofen 200 MG capsule Take 600 mg by mouth 3 times daily as needed for rib pain. Take with food.       MULTI-VITAMIN OR TABS 1 TABLET DAILY       pravastatin (PRAVACHOL) 80 MG tablet Take 1 tablet (80 mg) by mouth daily 90 tablet 3     Probiotic Product (PROBIOTIC DAILY PO) Take 0.5 chew tab by mouth daily       ranitidine (ZANTAC) 75 MG tablet 1 tablet 2 times daily as needed. for reflux symptoms.       triamterene-hydrochlorothiazide (MAXZIDE) 75-50 MG per tablet Take 1 tablet by mouth daily 90 tablet 3     varenicline (CHANTIX STARTING MONTH BLOSSOM) 0.5 MG X 11 & 1 MG X 42 tablet Take 0.5 mg tab daily for 3 days, then 0.5 mg tab twice daily for 4 days, then 1 mg twice daily. 53 tablet 0     varenicline (CHANTIX) 1 MG tablet Take 1 tablet (1 mg) by mouth 2 times daily 56 tablet 2     varenicline (CHANTIX CONTINUING MONTH BLOSSOM) 1 MG tablet Take 1 tablet (1 mg) by mouth 2 times daily (Patient not taking: Reported on 9/26/2018) 60 tablet 0     OBJECTIVE:   /74  Pulse 82   "Temp 98.6  F (37  C) (Oral)  Ht 5' 5.25\" (1.657 m)  Wt 218 lb 8 oz (99.1 kg)  SpO2 94%  BMI 36.08 kg/m2    Physical Exam  GENERAL:  alert and no distress  EYES: Eyes grossly normal to inspection, PERRL and conjunctivae and sclerae normal  HENT: ear canals and TM's normal, nose and mouth without ulcers or lesions  NECK: no adenopathy, no asymmetry, masses, or scars and thyroid normal to palpation  RESP: lungs clear to auscultation - no rales, rhonchi or wheezes  CV: regular rate and rhythm, normal S1 S2, no S3 or S4, no murmur, click or rub, no peripheral edema and peripheral pulses strong  ABDOMEN: soft, nontender, no hepatosplenomegaly, no masses and bowel sounds normal  MS: no gross musculoskeletal defects noted, no edema  SKIN: no suspicious lesions or rashes  NEURO: Normal strength and tone, mentation intact and speech normal  PSYCH: mentation appears normal, affect normal/bright    ASSESSMENT/PLAN:       ICD-10-CM    1. Encounter for routine adult health examination without abnormal findings Z00.00        2. Essential hypertension with goal blood pressure less than 140/90 I10 Well controlled, continue maxzide, amlodipine       3. Hyperlipidemia LDL goal <100 E78.5 Lipid panel reflex to direct LDL Fasting     Comprehensive metabolic panel     Continue pravastatin, chk labs today     4. Morbid obesity (H) E66.01 Discussed weight loss goals/weight mgmt     5. Tobacco use disorder F17.200 varenicline (CHANTIX STARTING MONTH PAK) 0.5 MG X 11 & 1 MG X 42 tablet     varenicline (CHANTIX) 1 MG tablet     Interested in quitting, start chantix/Medication side effects discussed.      6. Gastroesophageal reflux disease without esophagitis K21.9 Sx well controlled       7. Tubular adenoma of colon D12.6 Recurrent polyps.  colonscopy 2018, next due in 2021     8. Need for prophylactic vaccination and inoculation against influenza Z23 FLU VACCINE, (RIV4) RECOMBINANT HA  , IM (FluBlok, egg free) [17899]- >18 YRS (G " "recommended  50-64 YRS)     Vaccine Administration, Initial [52232]     9. Need for tetanus booster Z23 TD PRESERV FREE, IM (7+ YRS)     10. Screening for prostate cancer Z12.5 Prostate spec antigen screen       COUNSELING:   Reviewed preventive health counseling, as reflected in patient instructions       Regular exercise       Healthy diet/nutrition       Colon cancer screening       Prostate cancer screening    BP Readings from Last 1 Encounters:   09/26/18 132/74     Estimated body mass index is 36.08 kg/(m^2) as calculated from the following:    Height as of this encounter: 5' 5.25\" (1.657 m).    Weight as of this encounter: 218 lb 8 oz (99.1 kg).      Weight management plan: Discussed healthy diet and exercise guidelines and patient will follow up in 12 months in clinic to re-evaluate.     reports that he has been smoking Cigarettes.  He has a 45.00 pack-year smoking history. He has never used smokeless tobacco.  Tobacco Cessation Action Plan: Pharmacotherapies : Chantix    Counseling Resources:  ATP IV Guidelines  Pooled Cohorts Equation Calculator  FRAX Risk Assessment  ICSI Preventive Guidelines  Dietary Guidelines for Americans, 2010  [employer]'s MyPlate  ASA Prophylaxis  Lung CA Screening    Kevin Ojeda MD, MD  [facility]  Answers for HPI/ROS submitted by the patient on 9/26/2018   PHQ-2 Score: 0      Injectable Influenza Immunization Documentation    1.  Is the person to be vaccinated sick today?   No    2. Does the person to be vaccinated have an allergy to a component   of the vaccine?   No  Egg Allergy Algorithm Link    3. Has the person to be vaccinated ever had a serious reaction   to influenza vaccine in the past?   No    4. Has the person to be vaccinated ever had Guillain-Barré syndrome?   No    Form completed by Patient and Lien Espana CMA         "

## 2019-01-02 DIAGNOSIS — F17.200 TOBACCO USE DISORDER: ICD-10-CM

## 2019-01-02 NOTE — TELEPHONE ENCOUNTER
"Requested Prescriptions   Pending Prescriptions Disp Refills     CHANTIX CONTINUING MONTH BLOSSOM 1 MG tablet [Pharmacy Med Name: Chantix Continuing Month Blossom Oral Tablet 1 MG]    Last Written Prescription Date:  3/22/2018  Last Fill Quantity: 60,  # refills: 0   Last office visit: 9/26/2018 with prescribing provider:  Kevin Ojeda     Future Office Visit:     56 tablet 1     Sig: Take 1 tablet (1 mg) by mouth 2 times daily    Partial Cholinergic Nicotinic Agonist Agents Passed - 1/2/2019  1:29 PM       Passed - Blood pressure under 140/90 in past 12 months    BP Readings from Last 3 Encounters:   09/26/18 132/74   03/06/18 150/80   02/20/18 145/82                Passed - Recent (12 mo) or future (30 days) visit within the authorizing provider's specialty    Patient had office visit in the last 12 months or has a visit in the next 30 days with authorizing provider or within the authorizing provider's specialty.  See \"Patient Info\" tab in inbasket, or \"Choose Columns\" in Meds & Orders section of the refill encounter.             Passed - Patient is 18 years of age or older          "

## 2019-01-03 RX ORDER — VARENICLINE TARTRATE 1 MG/1
TABLET, FILM COATED ORAL
Qty: 56 TABLET | Refills: 1 | Status: SHIPPED | OUTPATIENT
Start: 2019-01-03 | End: 2019-04-11

## 2019-01-03 NOTE — TELEPHONE ENCOUNTER
Routing refill request to provider for review/approval because:  A break in medication: does starter pack need to be ordered  Mary LITTLE RN - Triage  [facility]

## 2019-01-18 DIAGNOSIS — E78.5 HYPERLIPIDEMIA LDL GOAL <100: ICD-10-CM

## 2019-01-18 RX ORDER — PRAVASTATIN SODIUM 80 MG/1
TABLET ORAL
Qty: 90 TABLET | Refills: 2 | Status: SHIPPED | OUTPATIENT
Start: 2019-01-18 | End: 2019-10-21

## 2019-01-18 NOTE — TELEPHONE ENCOUNTER
"Requested Prescriptions   Pending Prescriptions Disp Refills     pravastatin (PRAVACHOL) 80 MG tablet [Pharmacy Med Name: Pravastatin Sodium Oral Tablet 80 MG]  Last Written Prescription Date:  2/20/2018  Last Fill Quantity: 90 tablet,  # refills: 3   Last Office Visit: 9/26/2018   Future Office Visit:      90 tablet 2     Sig: TAKE ONE TABLET BY MOUTH ONE TIME DAILY    Statins Protocol Passed - 1/18/2019  7:00 AM       Passed - LDL on file in past 12 months    Recent Labs   Lab Test 09/26/18  0836   LDL 55            Passed - No abnormal creatine kinase in past 12 months    No lab results found.            Passed - Recent (12 mo) or future (30 days) visit within the authorizing provider's specialty    Patient had office visit in the last 12 months or has a visit in the next 30 days with authorizing provider or within the authorizing provider's specialty.  See \"Patient Info\" tab in inbasket, or \"Choose Columns\" in Meds & Orders section of the refill encounter.             Passed - Medication is active on med list       Passed - Patient is age 18 or older          "

## 2019-01-18 NOTE — TELEPHONE ENCOUNTER
Prescription approved per [facility] Refill Protocol.    Brooke Humphreys RN

## 2019-03-18 DIAGNOSIS — I10 ESSENTIAL HYPERTENSION WITH GOAL BLOOD PRESSURE LESS THAN 140/90: ICD-10-CM

## 2019-03-18 NOTE — TELEPHONE ENCOUNTER
"Requested Prescriptions   Pending Prescriptions Disp Refills     triamterene-HCTZ (MAXZIDE) 75-50 MG tablet [Pharmacy Med Name: Triamterene-HCTZ Oral Tablet 75-50 MG]  Last Written Prescription Date:  02/20/2018  Last Fill Quantity: 90 tablet,  # refills: 3   Last office visit: 9/26/2018 with prescribing provider:  Kevin Ojeda MD    Future Office Visit:     90 tablet 2     Sig: TAKE ONE TABLET BY MOUTH ONE TIME DAILY    Diuretics (Including Combos) Protocol Passed - 3/18/2019 12:53 PM       Passed - Blood pressure under 140/90 in past 12 months    BP Readings from Last 3 Encounters:   09/26/18 132/74   03/06/18 150/80   02/20/18 145/82                Passed - Recent (12 mo) or future (30 days) visit within the authorizing provider's specialty    Patient had office visit in the last 12 months or has a visit in the next 30 days with authorizing provider or within the authorizing provider's specialty.  See \"Patient Info\" tab in inbasket, or \"Choose Columns\" in Meds & Orders section of the refill encounter.             Passed - Medication is active on med list       Passed - Patient is age 18 or older       Passed - Normal serum creatinine on file in past 12 months    Recent Labs   Lab Test 09/26/18  0836   CR 0.93             Passed - Normal serum potassium on file in past 12 months    Recent Labs   Lab Test 09/26/18  0836   POTASSIUM 3.9                   Passed - Normal serum sodium on file in past 12 months    Recent Labs   Lab Test 09/26/18  0836                   "

## 2019-03-20 RX ORDER — TRIAMTERENE AND HYDROCHLOROTHIAZIDE 75; 50 MG/1; MG/1
TABLET ORAL
Qty: 90 TABLET | Refills: 2 | Status: SHIPPED | OUTPATIENT
Start: 2019-03-20 | End: 2019-10-21

## 2019-03-20 NOTE — TELEPHONE ENCOUNTER
Prescription approved per [facility] Refill Protocol.    Shanita Hicks RN Flex

## 2019-03-24 DIAGNOSIS — I10 ESSENTIAL HYPERTENSION WITH GOAL BLOOD PRESSURE LESS THAN 140/90: ICD-10-CM

## 2019-03-24 NOTE — TELEPHONE ENCOUNTER
"Requested Prescriptions   Pending Prescriptions Disp Refills     amLODIPine (NORVASC) 5 MG tablet [Pharmacy Med Name: amLODIPine Besylate Oral Tablet 5 MG]  Last Written Prescription Date:  03/20/2018  Last Fill Quantity: 90 tablet,  # refills: 3   Last office visit: 9/26/2018 with prescribing provider:  Kevin Ojeda MD    Future Office Visit:     90 tablet 2     Sig: TAKE ONE TABLET BY MOUTH ONE TIME DAILY    Calcium Channel Blockers Protocol  Passed - 3/24/2019  7:00 AM       Passed - Blood pressure under 140/90 in past 12 months    BP Readings from Last 3 Encounters:   09/26/18 132/74   03/06/18 150/80   02/20/18 145/82                Passed - Recent (12 mo) or future (30 days) visit within the authorizing provider's specialty    Patient had office visit in the last 12 months or has a visit in the next 30 days with authorizing provider or within the authorizing provider's specialty.  See \"Patient Info\" tab in inbasket, or \"Choose Columns\" in Meds & Orders section of the refill encounter.             Passed - Medication is active on med list       Passed - Patient is age 18 or older       Passed - Normal serum creatinine on file in past 12 months    Recent Labs   Lab Test 09/26/18  0836   CR 0.93               "

## 2019-03-26 RX ORDER — AMLODIPINE BESYLATE 5 MG/1
TABLET ORAL
Qty: 90 TABLET | Refills: 1 | Status: SHIPPED | OUTPATIENT
Start: 2019-03-26 | End: 2019-10-21

## 2019-04-11 DIAGNOSIS — F17.200 TOBACCO USE DISORDER: ICD-10-CM

## 2019-04-11 NOTE — TELEPHONE ENCOUNTER
"Requested Prescriptions   Pending Prescriptions Disp Refills     CHANTIX CONTINUING MONTH BLOSSOM 1 MG tablet [Pharmacy Med Name: Chantix Continuing Month Blossom Oral Tablet 1 MG]  Last Written Prescription Date:  01/03/2019  Last Fill Quantity: 56 tablet,  # refills: 1    Last office visit: 9/26/2018 with prescribing provider:  Kevin Ojeda MD        Future Office Visit:     56 tablet 0     Sig: Take 1 tablet (1 mg) by mouth 2 times daily       Partial Cholinergic Nicotinic Agonist Agents Passed - 4/11/2019  2:27 PM        Passed - Blood pressure under 140/90 in past 12 months     BP Readings from Last 3 Encounters:   09/26/18 132/74   03/06/18 150/80   02/20/18 145/82                 Passed - Recent (12 mo) or future (30 days) visit within the authorizing provider's specialty     Patient had office visit in the last 12 months or has a visit in the next 30 days with authorizing provider or within the authorizing provider's specialty.  See \"Patient Info\" tab in inbasket, or \"Choose Columns\" in Meds & Orders section of the refill encounter.              Passed - Medication is active on med list        Passed - Patient is 18 years of age or older          "

## 2019-04-15 RX ORDER — VARENICLINE TARTRATE 1 MG/1
TABLET, FILM COATED ORAL
Qty: 56 TABLET | Refills: 0 | Status: SHIPPED | OUTPATIENT
Start: 2019-04-15 | End: 2019-08-01

## 2019-04-15 NOTE — TELEPHONE ENCOUNTER
Routing refill request to provider for review/approval because:  Unable to fill per medication warnings.  Please fill and provider refills for as long as able.    Vicky Roa RN  Message handled by Nurse Triage.

## 2019-08-01 DIAGNOSIS — F17.200 TOBACCO USE DISORDER: ICD-10-CM

## 2019-08-01 NOTE — TELEPHONE ENCOUNTER
"Requested Prescriptions   Pending Prescriptions Disp Refills     CHANTIX CONTINUING MONTH BLOSSOM 1 MG tablet [Pharmacy Med Name: Chantix Continuing Month Blossom Oral Tablet 1 MG]  Last Written Prescription Date:  04/15/2019  Last Fill Quantity: 56 tablet,  # refills: 0   Last Office Visit: 9/26/2018 Kevin Ojeda MD  Future Office Visit:      56 tablet 0     Sig: Take 1 tablet (1 mg) by mouth 2 times daily       Partial Cholinergic Nicotinic Agonist Agents Passed - 8/1/2019  4:27 PM        Passed - Blood pressure under 140/90 in past 12 months     BP Readings from Last 3 Encounters:   09/26/18 132/74   03/06/18 150/80   02/20/18 145/82                 Passed - Recent (12 mo) or future (30 days) visit within the authorizing provider's specialty     Patient had office visit in the last 12 months or has a visit in the next 30 days with authorizing provider or within the authorizing provider's specialty.  See \"Patient Info\" tab in inbasket, or \"Choose Columns\" in Meds & Orders section of the refill encounter.              Passed - Medication is active on med list        Passed - Patient is 18 years of age or older          "

## 2019-08-02 RX ORDER — VARENICLINE TARTRATE 1 MG/1
TABLET, FILM COATED ORAL
Qty: 56 TABLET | Refills: 0 | Status: SHIPPED | OUTPATIENT
Start: 2019-08-02 | End: 2019-09-06

## 2019-08-02 NOTE — TELEPHONE ENCOUNTER
Prescription approved per [facility] Refill Protocol.  Manohar Loomis, RN, BSN

## 2019-09-06 DIAGNOSIS — F17.200 TOBACCO USE DISORDER: ICD-10-CM

## 2019-09-06 NOTE — TELEPHONE ENCOUNTER
"Requested Prescriptions   Pending Prescriptions Disp Refills     CHANTIX 1 MG tablet [Pharmacy Med Name: Chantix Oral Tablet 1 MG]  Last Written Prescription Date:  08/02/2019  Last Fill Quantity: 56 tablet,  # refills: 0   Last Office Visit: 9/26/2018 Kevin Ojeda MD   Future Office Visit:    Next 5 appointments (look out 90 days)    Oct 21, 2019  7:50 AM CDT  Adult physical with Kevin Ojeda MD, EA EXAM ROOM 15  [facility] [address] [place] [address]  [address]  [facility] [address]  [phone]          56 tablet 0     Sig: TAKE ONE TABLET BY MOUTH TWICE DAILY       Partial Cholinergic Nicotinic Agonist Agents Passed - 9/6/2019  4:39 PM        Passed - Blood pressure under 140/90 in past 12 months     BP Readings from Last 3 Encounters:   09/26/18 132/74   03/06/18 150/80   02/20/18 145/82             Passed - Recent (12 mo) or future (30 days) visit within the authorizing provider's specialty     Patient had office visit in the last 12 months or has a visit in the next 30 days with authorizing provider or within the authorizing provider's specialty.  See \"Patient Info\" tab in inbasket, or \"Choose Columns\" in Meds & Orders section of the refill encounter.              Passed - Medication is active on med list        Passed - Patient is 18 years of age or older          "

## 2019-09-09 RX ORDER — VARENICLINE TARTRATE 1 MG/1
TABLET, FILM COATED ORAL
Qty: 56 TABLET | Refills: 0 | Status: SHIPPED | OUTPATIENT
Start: 2019-09-09 | End: 2019-10-21

## 2019-09-09 NOTE — TELEPHONE ENCOUNTER
Routing refill request to provider for review/approval because:      Barbara Fenton RN, [facility]

## 2019-10-19 DIAGNOSIS — F17.200 TOBACCO USE DISORDER: ICD-10-CM

## 2019-10-19 NOTE — TELEPHONE ENCOUNTER
"Requested Prescriptions   Pending Prescriptions Disp Refills     CHANTIX 1 MG tablet [Pharmacy Med Name: Chantix Oral Tablet 1 MG]  Last Written Prescription Date:  09/09/2019  Last Fill Quantity: 56 tablets,  # refills: 0   Last Office Visit: 9/26/2018 Kevin Ojeda MD   Future Office Visit:    Next 5 appointments (look out 90 days)    Oct 21, 2019  7:50 AM CDT  Adult physical with Kevin Ojeda MD, EA EXAM ROOM 15  [facility] [address] [place] [address]  [address]  [facility] [address]  [phone]          56 tablet 0     Sig: TAKE ONE TABLET BY MOUTH TWICE DAILY       Partial Cholinergic Nicotinic Agonist Agents Failed - 10/19/2019 12:16 PM        Failed - Blood pressure under 140/90 in past 12 months     BP Readings from Last 3 Encounters:   09/26/18 132/74   03/06/18 150/80   02/20/18 145/82           Passed - Recent (12 mo) or future (30 days) visit within the authorizing provider's specialty     Patient has had an office visit with the authorizing provider or a provider within the authorizing providers department within the previous 12 mos or has a future within next 30 days. See \"Patient Info\" tab in inbasket, or \"Choose Columns\" in Meds & Orders section of the refill encounter.              Passed - Medication is active on med list        Passed - Patient is 18 years of age or older          "

## 2019-10-21 ENCOUNTER — OFFICE VISIT (OUTPATIENT)
Dept: PEDIATRICS | Facility: CLINIC | Age: 64
End: 2019-10-21
Payer: OTHER GOVERNMENT

## 2019-10-21 VITALS
WEIGHT: 222 LBS | TEMPERATURE: 98.6 F | BODY MASS INDEX: 36.99 KG/M2 | RESPIRATION RATE: 16 BRPM | HEIGHT: 65 IN | OXYGEN SATURATION: 94 % | HEART RATE: 91 BPM | SYSTOLIC BLOOD PRESSURE: 134 MMHG | DIASTOLIC BLOOD PRESSURE: 70 MMHG

## 2019-10-21 DIAGNOSIS — Z12.5 SCREENING FOR PROSTATE CANCER: ICD-10-CM

## 2019-10-21 DIAGNOSIS — M62.830 SPASM OF MUSCLE OF LOWER BACK: ICD-10-CM

## 2019-10-21 DIAGNOSIS — R39.11 BENIGN PROSTATIC HYPERPLASIA WITH URINARY HESITANCY: ICD-10-CM

## 2019-10-21 DIAGNOSIS — Z00.00 ROUTINE GENERAL MEDICAL EXAMINATION AT A HEALTH CARE FACILITY: Primary | ICD-10-CM

## 2019-10-21 DIAGNOSIS — D12.6 TUBULAR ADENOMA OF COLON: ICD-10-CM

## 2019-10-21 DIAGNOSIS — F17.200 TOBACCO USE DISORDER: ICD-10-CM

## 2019-10-21 DIAGNOSIS — N40.1 BENIGN PROSTATIC HYPERPLASIA WITH URINARY HESITANCY: ICD-10-CM

## 2019-10-21 DIAGNOSIS — E78.5 HYPERLIPIDEMIA LDL GOAL <100: ICD-10-CM

## 2019-10-21 DIAGNOSIS — I10 ESSENTIAL HYPERTENSION WITH GOAL BLOOD PRESSURE LESS THAN 140/90: ICD-10-CM

## 2019-10-21 LAB
ALBUMIN SERPL-MCNC: 3.7 G/DL (ref 3.4–5)
ALP SERPL-CCNC: 89 U/L (ref 40–150)
ALT SERPL W P-5'-P-CCNC: 44 U/L (ref 0–70)
ANION GAP SERPL CALCULATED.3IONS-SCNC: 8 MMOL/L (ref 3–14)
AST SERPL W P-5'-P-CCNC: 21 U/L (ref 0–45)
BILIRUB SERPL-MCNC: 0.5 MG/DL (ref 0.2–1.3)
BUN SERPL-MCNC: 10 MG/DL (ref 7–30)
CALCIUM SERPL-MCNC: 8.8 MG/DL (ref 8.5–10.1)
CHLORIDE SERPL-SCNC: 104 MMOL/L (ref 94–109)
CHOLEST SERPL-MCNC: 161 MG/DL
CO2 SERPL-SCNC: 26 MMOL/L (ref 20–32)
CREAT SERPL-MCNC: 0.89 MG/DL (ref 0.66–1.25)
GFR SERPL CREATININE-BSD FRML MDRD: >90 ML/MIN/{1.73_M2}
GLUCOSE SERPL-MCNC: 105 MG/DL (ref 70–99)
HDLC SERPL-MCNC: 41 MG/DL
LDLC SERPL CALC-MCNC: 65 MG/DL
NONHDLC SERPL-MCNC: 120 MG/DL
POTASSIUM SERPL-SCNC: 3.8 MMOL/L (ref 3.4–5.3)
PROT SERPL-MCNC: 6.8 G/DL (ref 6.8–8.8)
PSA SERPL-ACNC: 3.67 UG/L (ref 0–4)
SODIUM SERPL-SCNC: 138 MMOL/L (ref 133–144)
TRIGL SERPL-MCNC: 273 MG/DL

## 2019-10-21 PROCEDURE — G0103 PSA SCREENING: HCPCS | Performed by: INTERNAL MEDICINE

## 2019-10-21 PROCEDURE — 99396 PREV VISIT EST AGE 40-64: CPT | Performed by: INTERNAL MEDICINE

## 2019-10-21 PROCEDURE — 36415 COLL VENOUS BLD VENIPUNCTURE: CPT | Performed by: INTERNAL MEDICINE

## 2019-10-21 PROCEDURE — 80053 COMPREHEN METABOLIC PANEL: CPT | Performed by: INTERNAL MEDICINE

## 2019-10-21 PROCEDURE — 80061 LIPID PANEL: CPT | Performed by: INTERNAL MEDICINE

## 2019-10-21 RX ORDER — AMLODIPINE BESYLATE 5 MG/1
5 TABLET ORAL DAILY
Qty: 90 TABLET | Refills: 3 | Status: SHIPPED | OUTPATIENT
Start: 2019-10-21 | End: 2020-11-05

## 2019-10-21 RX ORDER — PRAVASTATIN SODIUM 80 MG/1
80 TABLET ORAL DAILY
Qty: 90 TABLET | Refills: 3 | Status: SHIPPED | OUTPATIENT
Start: 2019-10-21 | End: 2021-01-18

## 2019-10-21 RX ORDER — TRIAMTERENE AND HYDROCHLOROTHIAZIDE 75; 50 MG/1; MG/1
1 TABLET ORAL DAILY
Qty: 90 TABLET | Refills: 3 | Status: SHIPPED | OUTPATIENT
Start: 2019-10-21 | End: 2020-11-23

## 2019-10-21 RX ORDER — VARENICLINE TARTRATE 1 MG/1
TABLET, FILM COATED ORAL
Qty: 56 TABLET | Refills: 0 | OUTPATIENT
Start: 2019-10-21

## 2019-10-21 RX ORDER — METHOCARBAMOL 500 MG/1
500 TABLET, FILM COATED ORAL 3 TIMES DAILY PRN
Qty: 30 TABLET | Refills: 0 | Status: SHIPPED | OUTPATIENT
Start: 2019-10-21 | End: 2020-11-05

## 2019-10-21 RX ORDER — VARENICLINE TARTRATE 1 MG/1
1 TABLET, FILM COATED ORAL 2 TIMES DAILY
Qty: 60 TABLET | Refills: 1 | Status: SHIPPED | OUTPATIENT
Start: 2019-10-21 | End: 2019-12-27

## 2019-10-21 SDOH — SOCIAL STABILITY: SOCIAL NETWORK
DO YOU BELONG TO ANY CLUBS OR ORGANIZATIONS SUCH AS CHURCH GROUPS UNIONS, FRATERNAL OR ATHLETIC GROUPS, OR SCHOOL GROUPS?: NO

## 2019-10-21 SDOH — HEALTH STABILITY: PHYSICAL HEALTH: ON AVERAGE, HOW MANY DAYS PER WEEK DO YOU ENGAGE IN MODERATE TO STRENUOUS EXERCISE (LIKE A BRISK WALK)?: 0 DAYS

## 2019-10-21 SDOH — SOCIAL STABILITY: SOCIAL NETWORK: IN A TYPICAL WEEK, HOW MANY TIMES DO YOU TALK ON THE PHONE WITH FAMILY, FRIENDS, OR NEIGHBORS?: THREE TIMES A WEEK

## 2019-10-21 SDOH — HEALTH STABILITY: MENTAL HEALTH: HOW OFTEN DO YOU HAVE A DRINK CONTAINING ALCOHOL?: 2-4 TIMES A MONTH

## 2019-10-21 SDOH — HEALTH STABILITY: MENTAL HEALTH: HOW OFTEN DO YOU HAVE 6 OR MORE DRINKS ON ONE OCCASION?: NEVER

## 2019-10-21 SDOH — HEALTH STABILITY: PHYSICAL HEALTH: ON AVERAGE, HOW MANY MINUTES DO YOU ENGAGE IN EXERCISE AT THIS LEVEL?: 0 MIN

## 2019-10-21 SDOH — HEALTH STABILITY: MENTAL HEALTH
STRESS IS WHEN SOMEONE FEELS TENSE, NERVOUS, ANXIOUS, OR CAN'T SLEEP AT NIGHT BECAUSE THEIR MIND IS TROUBLED. HOW STRESSED ARE YOU?: ONLY A LITTLE

## 2019-10-21 SDOH — ECONOMIC STABILITY: INCOME INSECURITY: HOW HARD IS IT FOR YOU TO PAY FOR THE VERY BASICS LIKE FOOD, HOUSING, MEDICAL CARE, AND HEATING?: NOT HARD AT ALL

## 2019-10-21 SDOH — HEALTH STABILITY: MENTAL HEALTH: HOW MANY STANDARD DRINKS CONTAINING ALCOHOL DO YOU HAVE ON A TYPICAL DAY?: 1 OR 2

## 2019-10-21 SDOH — ECONOMIC STABILITY: FOOD INSECURITY: WITHIN THE PAST 12 MONTHS, YOU WORRIED THAT YOUR FOOD WOULD RUN OUT BEFORE YOU GOT MONEY TO BUY MORE.: NEVER TRUE

## 2019-10-21 SDOH — ECONOMIC STABILITY: FOOD INSECURITY: WITHIN THE PAST 12 MONTHS, THE FOOD YOU BOUGHT JUST DIDN'T LAST AND YOU DIDN'T HAVE MONEY TO GET MORE.: NEVER TRUE

## 2019-10-21 SDOH — ECONOMIC STABILITY: TRANSPORTATION INSECURITY
IN THE PAST 12 MONTHS, HAS THE LACK OF TRANSPORTATION KEPT YOU FROM MEDICAL APPOINTMENTS OR FROM GETTING MEDICATIONS?: NO

## 2019-10-21 SDOH — ECONOMIC STABILITY: TRANSPORTATION INSECURITY
IN THE PAST 12 MONTHS, HAS LACK OF TRANSPORTATION KEPT YOU FROM MEETINGS, WORK, OR FROM GETTING THINGS NEEDED FOR DAILY LIVING?: NO

## 2019-10-21 SDOH — SOCIAL STABILITY: SOCIAL NETWORK: HOW OFTEN DO YOU ATTENT MEETINGS OF THE CLUB OR ORGANIZATION YOU BELONG TO?: NEVER

## 2019-10-21 SDOH — SOCIAL STABILITY: SOCIAL NETWORK: HOW OFTEN DO YOU ATTEND CHURCH OR RELIGIOUS SERVICES?: NEVER

## 2019-10-21 SDOH — SOCIAL STABILITY: SOCIAL NETWORK: ARE YOU MARRIED, WIDOWED, DIVORCED, SEPARATED, NEVER MARRIED, OR LIVING WITH A PARTNER?: MARRIED

## 2019-10-21 SDOH — SOCIAL STABILITY: SOCIAL NETWORK: HOW OFTEN DO YOU GET TOGETHER WITH FRIENDS OR RELATIVES?: ONCE A WEEK

## 2019-10-21 ASSESSMENT — ENCOUNTER SYMPTOMS
CONSTIPATION: 0
DIARRHEA: 1
FEVER: 0
HEARTBURN: 1
JOINT SWELLING: 0
SORE THROAT: 0
ARTHRALGIAS: 1
NAUSEA: 0
CHILLS: 0
EYE PAIN: 0
PALPITATIONS: 0
COUGH: 0
ABDOMINAL PAIN: 0
SHORTNESS OF BREATH: 0
DYSURIA: 0
HEMATURIA: 0
DIZZINESS: 0
NERVOUS/ANXIOUS: 0
MYALGIAS: 0
HEADACHES: 0
WEAKNESS: 0
HEMATOCHEZIA: 0
FREQUENCY: 0
PARESTHESIAS: 0

## 2019-10-21 ASSESSMENT — MIFFLIN-ST. JEOR: SCORE: 1737.83

## 2019-10-21 NOTE — PROGRESS NOTES
SUBJECTIVE:   CC: Mayur Sheth is an 64 year old male who presents for preventative health visit.     Healthy Habits:     Getting at least 3 servings of Calcium per day:  Yes    Bi-annual eye exam:  Yes    Dental care twice a year:  Yes    Sleep apnea or symptoms of sleep apnea:  None    Diet:  Other    Frequency of exercise:  2-3 days/week    Duration of exercise:  30-45 minutes    Taking medications regularly:  Yes    PHQ-2 Total Score: 0    Additional concerns today:  No              Today's PHQ-2 Score:   PHQ-2 ( 1999 [employer]) 10/21/2019   Q1: Little interest or pleasure in doing things 0   Q2: Feeling down, depressed or hopeless 0   PHQ-2 Score 0   Q1: Little interest or pleasure in doing things Not at all   Q2: Feeling down, depressed or hopeless Not at all   PHQ-2 Score 0       Abuse: Current or Past(Physical, Sexual or Emotional)- No  Do you feel safe in your environment? Yes    Social History     Tobacco Use     Smoking status: Current Every Day Smoker     Packs/day: 1.00     Years: 45.00     Pack years: 45.00     Types: Cigarettes     Smokeless tobacco: Never Used     Tobacco comment: requit, uses Chantix successfully   Substance Use Topics     Alcohol use: Yes     Alcohol/week: 6.0 standard drinks     Types: 6 Standard drinks or equivalent per week     Frequency: 2-4 times a month     Drinks per session: 1 or 2     Binge frequency: Never     Comment: 5 beers per month          Alcohol Use 10/21/2019   Prescreen: >3 drinks/day or >7 drinks/week? No   Prescreen: >3 drinks/day or >7 drinks/week? -       Last PSA:   PSA   Date Value Ref Range Status   09/26/2018 2.60 0 - 4 ug/L Final     Comment:     Assay Method:  Chemiluminescence using Siemens Vista analyzer       Reviewed orders with patient. Reviewed health maintenance and updated orders accordingly - Yes      Reviewed and updated as needed this visit by clinical staff  Tobacco  Allergies         Reviewed and updated as needed this visit by  Provider        Patient Active Problem List   Diagnosis     Family history of ischemic heart disease     Hyperlipidemia LDL goal <100     Essential hypertension with goal blood pressure less than 140/90     Gastroesophageal reflux disease without esophagitis     Diverticulosis     Obesity, Class II, BMI 35-39.9, with comorbidity     Low back pain     AC (acromioclavicular) joint arthritis     Tubular adenoma of colon     Obesity (BMI 35.0-39.9) with comorbidity (H)     Past Medical History:   Diagnosis Date     AC (acromioclavicular) joint arthritis 2016     Bee sting allergies      Complete tear of left rotator cuff 2016     Diverticulosis      Essential hypertension, benign      GERD (gastroesophageal reflux disease)      Hyperlipidemia LDL goal <100     Fam Hx CAD     Obesity, Class II, BMI 35-39.9, with comorbidity 2013     Rotator cuff arthropathy, left      S/P rotator cuff repair 16    left     Tubular adenoma 3/24/08       Past Surgical History:   Procedure Laterality Date     ARTHROSCOPY SHLDR ROTATOR CUFF REPAIR, SUBACROMIAL DECOMP, DIST CLAVICLE RESECTION, BICEP TENODESIS Left 2016    Procedure: ARTHROSCOPY SHOULDER ROTATOR CUFF REPAIR, SUBACROMIAL DECOMPRESSION, DISTAL CLAVICLE RESECTION, OPEN BICEP TENODESIS REPAIR;  Surgeon: Donell Brower MD;  Location: [facility] OR     C REPAIR CRUCIATE LIGAMENT,KNEE  ~    LT ACL     TONSILLECTOMY & ADENOIDECTOMY         Family History   Problem Relation Age of Onset     Hypertension Mother      Diabetes Mother      Heart Disease Mother         AFib     Myocardial Infarction Mother      Cerebrovascular Disease Mother      Heart Failure Mother              Hypertension Father      Myocardial Infarction Father 62             Diabetes Maternal Grandmother      Cancer - colorectal Paternal Grandmother      Diabetes Paternal Grandmother      Cerebrovascular Disease Paternal Grandmother      Myocardial Infarction Brother 43         "MI, multiple     Psoriasis Sister      Thyroid Disease No family hx of      Glaucoma No family hx of      Macular Degeneration No family hx of      Anesthesia Reaction No family hx of      Thrombophilia No family hx of      Bleeding Disorder No family hx of        ALLERGIES     Allergies   Allergen Reactions     No Clinical Screening - See Comments Anaphylaxis     Detergents,        Bee      Morphine      Current Outpatient Medications   Medication Sig Dispense Refill     amLODIPine (NORVASC) 5 MG tablet Take 1 tablet (5 mg) by mouth daily 90 tablet 3             MULTI-VITAMIN OR TABS 1 TABLET DAILY       pravastatin (PRAVACHOL) 80 MG tablet Take 1 tablet (80 mg) by mouth daily 90 tablet 3     Probiotic Product (PROBIOTIC DAILY PO) Take 0.5 chew tab by mouth daily       triamterene-HCTZ (MAXZIDE) 75-50 MG tablet Take 1 tablet by mouth daily 90 tablet 3       Review of Systems   Constitutional: Negative for chills and fever.   HENT: Positive for congestion. Negative for ear pain, hearing loss and sore throat.    Eyes: Negative for pain and visual disturbance.   Respiratory: Negative for cough and shortness of breath.    Cardiovascular: Negative for chest pain, palpitations and peripheral edema.   Gastrointestinal: Positive for diarrhea and heartburn. Negative for abdominal pain, constipation, hematochezia and nausea.   Genitourinary: Negative for discharge, dysuria, frequency, genital sores, hematuria, impotence and urgency.   Musculoskeletal: Positive for arthralgias. Negative for joint swelling and myalgias.   Skin: Negative for rash.   Neurological: Negative for dizziness, weakness, headaches and paresthesias.   Psychiatric/Behavioral: Negative for mood changes. The patient is not nervous/anxious.          OBJECTIVE:   /70 (Cuff Size: Adult Regular)   Pulse 91   Temp 98.6  F (37  C) (Oral)   Resp 16   Ht 1.657 m (5' 5.25\")   Wt 100.7 kg (222 lb)   SpO2 94%   BMI 36.66 kg/m      Physical Exam  GENERAL: "  alert and no distress  EYES: Eyes grossly normal to inspection, PERRL and conjunctivae and sclerae normal  HENT: ear canals and TM's normal, nose and mouth without ulcers or lesions  NECK: no adenopathy, no asymmetry, masses, or scars and thyroid normal to palpation  RESP: lungs clear to auscultation - no rales, rhonchi or wheezes  CV: regular rate and rhythm, normal S1 S2, no S3 or S4, no murmur, click or rub, no peripheral edema and peripheral pulses strong  ABDOMEN: soft, nontender, no hepatosplenomegaly, no masses and bowel sounds normal  MS: no gross musculoskeletal defects noted, no edema  SKIN: no suspicious lesions or rashes  NEURO: Normal strength and tone, mentation intact and speech normal  PSYCH: mentation appears normal, affect normal/bright  Prostate exam:  Smooth, mildly enlarged without nodule      ASSESSMENT/PLAN:       ICD-10-CM    1. Routine general medical examination at a health care facility Z00.00   45pkyr smoker.  Offered CT lung ca screening.  Pt declines     2. Essential hypertension with goal blood pressure less than 140/90 I10 amLODIPine (NORVASC) 5 MG tablet     triamterene-HCTZ (MAXZIDE) 75-50 MG tablet      Adequate control on current rx     3. Hyperlipidemia LDL goal <100 E78.5 Lipid panel reflex to direct LDL Fasting     Comprehensive metabolic panel (BMP + Alb, Alk Phos, ALT, AST, Total. Bili, TP)     pravastatin (PRAVACHOL) 80 MG tablet     4. Tobacco use disorder F17.200 varenicline (CHANTIX CONTINUING MONTH BLOSSOM) 1 MG tablet     Planning to quit 11/4, requests chantix     5. Tubular adenoma of colon D12.6 Colonoscopy 2018, next due in 2021     6. Spasm of muscle of lower back M62.830 methocarbamol (ROBAXIN) 500 MG tablet      Episodes of low back spasm 1-2x per year.   Requests lorazepam (did not tolerate flexeril).   Did not recommend lorazepam/ rec trial of methocarbamol prn-Medication side effects discussed.      7. Benign prostatic hyperplasia with urinary hesitancy N40.1  "BPH.  Dx discussed.  Pt declines rx    R39.11    8. Screening for prostate cancer Z12.5 Prostate spec antigen screen       COUNSELING:   Reviewed preventive health counseling, as reflected in patient instructions       Regular exercise       Healthy diet/nutrition       Colon cancer screening       Prostate cancer screening    Estimated body mass index is 36.66 kg/m  as calculated from the following:    Height as of this encounter: 1.657 m (5' 5.25\").    Weight as of this encounter: 100.7 kg (222 lb).     Weight management plan: Discussed healthy diet and exercise guidelines     reports that he has been smoking cigarettes. He has a 45.00 pack-year smoking history. He has never used smokeless tobacco.  Tobacco Cessation Action Plan: Pharmacotherapies : Chantix    Counseling Resources:  ATP IV Guidelines  Pooled Cohorts Equation Calculator  FRAX Risk Assessment  ICSI Preventive Guidelines  Dietary Guidelines for Americans, 2010  USDA's MyPlate  ASA Prophylaxis  Lung CA Screening    Kevin Ojeda MD, MD  [facility]  "

## 2019-10-21 NOTE — TELEPHONE ENCOUNTER
Refused. Patient seen in clinic today and had Rx filled.    Barbara Fenton RN, [facility] Triage

## 2019-10-27 PROBLEM — R73.03 PREDIABETES: Status: ACTIVE | Noted: 2019-10-27

## 2019-12-15 ENCOUNTER — HEALTH MAINTENANCE LETTER (OUTPATIENT)
Age: 64
End: 2019-12-15

## 2019-12-26 DIAGNOSIS — F17.200 TOBACCO USE DISORDER: ICD-10-CM

## 2019-12-27 RX ORDER — VARENICLINE TARTRATE 1 MG/1
TABLET, FILM COATED ORAL
Qty: 56 TABLET | Refills: 0 | Status: SHIPPED | OUTPATIENT
Start: 2019-12-27 | End: 2020-01-23

## 2019-12-27 NOTE — TELEPHONE ENCOUNTER
"Requested Prescriptions   Pending Prescriptions Disp Refills     CHANTIX CONTINUING MONTH BLOSSOM 1 MG tablet [Pharmacy Med Name: Chantix Continuing Month Blossom Oral Tablet 1 MG] 56 tablet 0     Sig: Take 1 tablet (1 mg) by mouth 2 times daily   Last Written Prescription Date:  10/21/19  Last Fill Quantity: 60,  # refills: 1   Last office visit: 10/21/2019 with prescribing provider   Future Office Visit:  na      Partial Cholinergic Nicotinic Agonist Agents Passed - 12/26/2019  3:29 PM        Passed - Blood pressure under 140/90 in past 12 months     BP Readings from Last 3 Encounters:   10/21/19 134/70   09/26/18 132/74   03/06/18 150/80                 Passed - Recent (12 mo) or future (30 days) visit within the authorizing provider's specialty     Patient has had an office visit with the authorizing provider or a provider within the authorizing providers department within the previous 12 mos or has a future within next 30 days. See \"Patient Info\" tab in inbasket, or \"Choose Columns\" in Meds & Orders section of the refill encounter.              Passed - Medication is active on med list        Passed - Patient is 18 years of age or older        Prescription [place] per [facility] [place].  Lizabeth Babin RN on 12/27/2019 at 11:08 AM    "

## 2020-01-23 ENCOUNTER — OFFICE VISIT (OUTPATIENT)
Dept: PEDIATRICS | Facility: CLINIC | Age: 65
End: 2020-01-23
Payer: OTHER GOVERNMENT

## 2020-01-23 ENCOUNTER — THERAPY VISIT (OUTPATIENT)
Dept: PHYSICAL THERAPY | Facility: CLINIC | Age: 65
End: 2020-01-23
Attending: INTERNAL MEDICINE
Payer: OTHER GOVERNMENT

## 2020-01-23 VITALS
SYSTOLIC BLOOD PRESSURE: 122 MMHG | DIASTOLIC BLOOD PRESSURE: 82 MMHG | HEIGHT: 65 IN | BODY MASS INDEX: 36.67 KG/M2 | HEART RATE: 86 BPM | WEIGHT: 220.1 LBS | TEMPERATURE: 98.3 F | OXYGEN SATURATION: 95 %

## 2020-01-23 DIAGNOSIS — M54.41 ACUTE RIGHT-SIDED LOW BACK PAIN WITH RIGHT-SIDED SCIATICA: Primary | ICD-10-CM

## 2020-01-23 DIAGNOSIS — M54.41 ACUTE RIGHT-SIDED LOW BACK PAIN WITH RIGHT-SIDED SCIATICA: ICD-10-CM

## 2020-01-23 PROCEDURE — 97161 PT EVAL LOW COMPLEX 20 MIN: CPT | Mod: GP | Performed by: PHYSICAL THERAPIST

## 2020-01-23 PROCEDURE — 97110 THERAPEUTIC EXERCISES: CPT | Mod: GP | Performed by: PHYSICAL THERAPIST

## 2020-01-23 PROCEDURE — 99214 OFFICE O/P EST MOD 30 MIN: CPT | Performed by: INTERNAL MEDICINE

## 2020-01-23 RX ORDER — NAPROXEN 500 MG/1
500 TABLET ORAL 2 TIMES DAILY WITH MEALS
Qty: 30 TABLET | Refills: 0 | Status: SHIPPED | OUTPATIENT
Start: 2020-01-23 | End: 2020-06-01

## 2020-01-23 RX ORDER — LORAZEPAM 0.5 MG/1
0.5 TABLET ORAL EVERY 6 HOURS PRN
Qty: 10 TABLET | Refills: 0 | Status: SHIPPED | OUTPATIENT
Start: 2020-01-23 | End: 2020-11-05

## 2020-01-23 ASSESSMENT — MIFFLIN-ST. JEOR: SCORE: 1724.21

## 2020-01-23 NOTE — PROGRESS NOTES
"Subjective     Mayur Sheth is a 65 year old male who presents to clinic today for the following health issues:    HPI     Back Pain       Duration: 1-2 week ago         Specific cause: none    Description:   Location of pain: low back bilateral  Character of pain: dull ache, stabbing and burning  Pain radiation:radiates into the right buttocks and radiates into the right leg  New numbness or weakness in legs, not attributed to pain:  no     Intensity: Currently 5/10, mild    History:   Pain interferes with job: YES  History of back problems: Back spasms   Any previous MRI or X-rays: None  Sees a specialist for back pain:  No  Therapies tried without relief: ibuprofen    Alleviating factors:   Improved by: sitting down, putting pressure       Precipitating factors:  Worsened by: Lying Flat      About 1 week ago noted onset of pain on right gluteus and down his right thigh about mid thigh.  Has tried methocarbamol, ibuprofen.  Has an inversion table he tried as well.   No weakness.  But does have some mild peripheral neuropathy.  No b/b symptoms.      Has had back pain past, but was in center of back, feels it \"spasming and grabbing\" his lower back.          Patient Active Problem List   Diagnosis     Family history of ischemic heart disease     Hyperlipidemia LDL goal <100     Essential hypertension with goal blood pressure less than 140/90     Gastroesophageal reflux disease without esophagitis     Diverticulosis     Obesity, Class II, BMI 35-39.9, with comorbidity     Low back pain     AC (acromioclavicular) joint arthritis     Tubular adenoma of colon     Obesity (BMI 35.0-39.9) with comorbidity (H)     Prediabetes     Past Surgical History:   Procedure Laterality Date     ARTHROSCOPY SHLDR ROTATOR CUFF REPAIR, SUBACROMIAL DECOMP, DIST CLAVICLE RESECTION, BICEP TENODESIS Left 12/14/2016    Procedure: ARTHROSCOPY SHOULDER ROTATOR CUFF REPAIR, SUBACROMIAL DECOMPRESSION, DISTAL CLAVICLE RESECTION, OPEN BICEP " TENODESIS REPAIR;  Surgeon: Donell Brower MD;  Location: [facility] OR     C REPAIR CRUCIATE LIGAMENT,KNEE  ~    LT ACL     TONSILLECTOMY & ADENOIDECTOMY         Social History     Tobacco Use     Smoking status: Current Every Day Smoker     Packs/day: 1.00     Years: 45.00     Pack years: 45.00     Types: Cigarettes     Smokeless tobacco: Never Used     Tobacco comment: requit, uses Chantix successfully   Substance Use Topics     Alcohol use: Yes     Alcohol/week: 6.0 standard drinks     Types: 6 Standard drinks or equivalent per week     Frequency: 2-4 times a month     Drinks per session: 1 or 2     Binge frequency: Never     Comment: 5 beers per month      Family History   Problem Relation Age of Onset     Hypertension Mother      Diabetes Mother      Heart Disease Mother         AFib     Myocardial Infarction Mother      Cerebrovascular Disease Mother      Heart Failure Mother              Hypertension Father      Myocardial Infarction Father 62             Diabetes Maternal Grandmother      Cancer - colorectal Paternal Grandmother      Diabetes Paternal Grandmother      Cerebrovascular Disease Paternal Grandmother      Myocardial Infarction Brother 43        MI, multiple     Psoriasis Sister      Thyroid Disease No family hx of      Glaucoma No family hx of      Macular Degeneration No family hx of      Anesthesia Reaction No family hx of      Thrombophilia No family hx of      Bleeding Disorder No family hx of          Current Outpatient Medications   Medication Sig Dispense Refill     amLODIPine (NORVASC) 5 MG tablet Take 1 tablet (5 mg) by mouth daily 90 tablet 3     LORazepam (ATIVAN) 0.5 MG tablet Take 1 tablet (0.5 mg) by mouth every 6 hours as needed for anxiety 10 tablet 0     methocarbamol (ROBAXIN) 500 MG tablet Take 1 tablet (500 mg) by mouth 3 times daily as needed for muscle spasms 30 tablet 0     MULTI-VITAMIN OR TABS 1 TABLET DAILY       naproxen (NAPROSYN) 500 MG tablet Take 1  "tablet (500 mg) by mouth 2 times daily (with meals) 30 tablet 0     pravastatin (PRAVACHOL) 80 MG tablet Take 1 tablet (80 mg) by mouth daily 90 tablet 3     Probiotic Product (PROBIOTIC DAILY PO) Take 0.5 chew tab by mouth daily       triamterene-HCTZ (MAXZIDE) 75-50 MG tablet Take 1 tablet by mouth daily 90 tablet 3     Allergies   Allergen Reactions     No Clinical Screening - See Comments Anaphylaxis     Detergents,        Bee      Morphine      BP Readings from Last 3 Encounters:   01/23/20 122/82   10/21/19 134/70   09/26/18 132/74    Wt Readings from Last 3 Encounters:   01/23/20 99.8 kg (220 lb 1.6 oz)   10/21/19 100.7 kg (222 lb)   09/26/18 99.1 kg (218 lb 8 oz)                      Reviewed and updated as needed this visit by Provider         Review of Systems   ROS COMP: CONSTITUTIONAL: NEGATIVE for fever, chills, change in weight  INTEGUMENTARY/SKIN: NEGATIVE for worrisome rashes, moles or lesions  EYES: NEGATIVE for vision changes or irritation  ENT/MOUTH: NEGATIVE for ear, mouth and throat problems  RESP: NEGATIVE for significant cough or SOB  BREAST: NEGATIVE for masses, tenderness or discharge  CV: NEGATIVE for chest pain, palpitations or peripheral edema  GI: NEGATIVE for nausea, abdominal pain, heartburn, or change in bowel habits  : NEGATIVE for frequency, dysuria, or hematuria  MUSCULOSKELETAL: NEGATIVE for significant arthralgias or myalgia  NEURO: NEGATIVE for weakness, dizziness or paresthesias  ENDOCRINE: NEGATIVE for temperature intolerance, skin/hair changes  HEME: NEGATIVE for bleeding problems  PSYCHIATRIC: NEGATIVE for changes in mood or affect      Objective    /82 (BP Location: Right arm, Patient Position: Sitting, Cuff Size: Adult Large)   Pulse 86   Temp 98.3  F (36.8  C) (Tympanic)   Ht 1.657 m (5' 5.25\")   Wt 99.8 kg (220 lb 1.6 oz)   SpO2 95%   BMI 36.35 kg/m    Body mass index is 36.35 kg/m .  Physical Exam   GENERAL: healthy, alert and no distress  EYES: Eyes " grossly normal to inspection, PERRL and conjunctivae and sclerae normal  HENT: ear canals and TM's normal, nose and mouth without ulcers or lesions  NECK: no adenopathy, no asymmetry, masses, or scars and thyroid normal to palpation  RESP: lungs clear to auscultation - no rales, rhonchi or wheezes  CV: regular rate and rhythm, normal S1 S2, no S3 or S4, no murmur, click or rub, no peripheral edema and peripheral pulses strong  ABDOMEN: soft, nontender, no hepatosplenomegaly, no masses and bowel sounds normal  MS: pain along right paravertebral area extending down to right buttock and mid posterior thigh. negative straight leg test.  full range of motion of hip.   SKIN: no suspicious lesions or rashes  NEURO: Normal strength and tone, mentation intact and speech normal  PSYCH: mentation appears normal, affect normal/bright    Diagnostic Test Results:  Labs reviewed in Epic        Assessment & Plan     1. Acute right-sided low back pain with right-sided sciatica  No signs of cauda equina.  WIll begin physical therapy today or tomorrow.   Patient Instructions   Let's try:  Aleve (naproxen) either 440 mg over-the-counter twice daily, or 500 mg prescription twice daily for 2 weeks.    May take lorazepam 0.5 mg up to Every 6 hours as needed:  No driving.    Call and set up appointment with physical therapy ASAP.    Shailesh Welch MD  Internal Medicine and Pediatrics        - [facility] PT, HAND, AND CHIROPRACTIC REFERRAL; Future  - LORazepam (ATIVAN) 0.5 MG tablet; Take 1 tablet (0.5 mg) by mouth every 6 hours as needed for anxiety  Dispense: 10 tablet; Refill: 0  - naproxen (NAPROSYN) 500 MG tablet; Take 1 tablet (500 mg) by mouth 2 times daily (with meals)  Dispense: 30 tablet; Refill: 0       See Patient Instructions    Return in about 4 weeks (around 2/20/2020) for Followup of today's problem.    Shailesh Welch MD  [facility]

## 2020-01-23 NOTE — PROGRESS NOTES
[facility] [facility] Initial Evaluation  Subjective:  The history is provided by the patient. The history is limited by a developmental delay. No  was used.   Type of problem:  Lumbar   Condition occurred with:  Insidious onset. This is a recurrent condition   Problem details: Pt reports past history of spasms in his low back. Last week, he had insidious onset of aching low back pain in central low back and intermittent burning into right buttocks and right hip and as far as the knee in the back of the leg. He feels worse lying down and has increased pain with sitting and better with standing. .   Patient reports pain:  Central lumbar spine. Radiates to:  Gluteals right and thigh right.  Symptoms are exacerbated by sitting and lying down and relieved by nothing.    Symptom: low back pain and right leg    Date of Onset: January 2010, MD orders 1-23-90. HPI: Documentation: insidious onset.  and reported as 5/10 on pain scale. General health as reported by patient is good. Pertinent medical history includes:  Smoking, numbness/tingling, high blood pressure, overweight and other (peripheral neuropathy intermittently- bottoms of feet).  Medical allergies: other (morphine).  Surgeries include:  Orthopedic surgery (left shoulder).  Current medications:  High blood pressure medication, muscle relaxants and anti-inflammatory.   Primary job tasks include:  Computer work and prolonged sitting.  Pain is described as aching and burning and is constant. Pain is worse during the night. Since onset symptoms are rapidly worsening.      Occupation: computer . Restrictions include:  Working in normal job without restrictions.    Barriers include:  None as reported by patient.  Red flags:  Pain at rest/night.                      Objective:  System         Lumbar/SI Evaluation  ROM:    AROM Lumbar:   Flexion:          Past knees  Ext:                    50% erp   Side Bend:        Left:      Right:   Rotation:           Left:     Right:   Side Glide:        Left:  40%    Right:  50%        Strength: weakness abdominals   Lumbar Myotomes:  normal            Lumbar DTR's:  not assessed        Lumbar Dermtomes:  not assessed                Neural Tension/Mobility:        Right side:   SLR w/DF or SLR  negative.   Lumbar Palpation:      Tenderness present at Right: Vertebral  Tenderness not present at Right:  Erector Spinae                                                       Alphonso Lumbar Evaluation    Posture:  Sitting: fair  Standing: fair  Lordosis: Reduced  Lateral Shift: no  Correction of Posture: better        Static Tests:          Lying Prone in Extension: Prone and NAOMI, centralizing    Conclusion: derangement  Principle of Treatment:      Extension: Prone lying progression                                           ROS    Assessment/Plan:    Patient is a 65 year old male with lumbar complaints.    Patient has the following significant findings with corresponding treatment plan.                Diagnosis 1:  Low back pain with right sciatic pain  Pain -  hot/cold therapy, self management, education and home program  Decreased ROM/flexibility - manual therapy, therapeutic exercise and home program  Decreased strength - therapeutic exercise, therapeutic activities and home program  Decreased function - therapeutic activities and home program    Therapy Evaluation Codes:   1) History comprised of:   Personal factors that impact the plan of care:      Past/current experiences.    Comorbidity factors that impact the plan of care are:      Numbness/tingling and Overweight.     Medications impacting care: Anti-inflammatory and Muscle relaxant.  2) Examination of Body Systems comprised of:   Body structures and functions that impact the plan of care:      Lumbar spine.   Activity limitations that impact the plan of care are:      Driving, Sitting, Sleeping and Laying down.  3) Clinical presentation  characteristics are:   Evolving/Changing.  4) Decision-Making    Low complexity using standardized patient assessment instrument and/or measureable assessment of functional outcome.  Cumulative Therapy Evaluation is: Low complexity.    Previous and current functional limitations:  (See Goal Flow Sheet for this information)    Short term and Long term goals: (See Goal Flow Sheet for this information)     Communication ability:  Patient appears to be able to clearly communicate and understand verbal and written communication and follow directions correctly.  Treatment Explanation - The following has been discussed with the patient:   RX ordered/plan of care  Anticipated outcomes  Possible risks and side effects  This patient would benefit from PT intervention to resume normal activities.   Rehab potential is excellent.    Frequency:  2 X week, once daily  Duration:  for 2 weeks tapering to 1 X a week over 4 weeks  Discharge Plan:  Achieve all LTG.  Independent in home treatment program.  Reach maximal therapeutic benefit.    Please refer to the daily flowsheet for treatment today, total treatment time and time spent performing 1:1 timed codes.

## 2020-01-27 ENCOUNTER — THERAPY VISIT (OUTPATIENT)
Dept: PHYSICAL THERAPY | Facility: CLINIC | Age: 65
End: 2020-01-27
Payer: OTHER GOVERNMENT

## 2020-01-27 DIAGNOSIS — M54.41 ACUTE RIGHT-SIDED LOW BACK PAIN WITH RIGHT-SIDED SCIATICA: Primary | ICD-10-CM

## 2020-01-27 PROCEDURE — 97140 MANUAL THERAPY 1/> REGIONS: CPT | Mod: GP | Performed by: PHYSICAL THERAPIST

## 2020-01-27 PROCEDURE — 97110 THERAPEUTIC EXERCISES: CPT | Mod: GP | Performed by: PHYSICAL THERAPIST

## 2020-01-30 ENCOUNTER — THERAPY VISIT (OUTPATIENT)
Dept: PHYSICAL THERAPY | Facility: CLINIC | Age: 65
End: 2020-01-30
Payer: OTHER GOVERNMENT

## 2020-01-30 DIAGNOSIS — M54.41 ACUTE RIGHT-SIDED LOW BACK PAIN WITH RIGHT-SIDED SCIATICA: Primary | ICD-10-CM

## 2020-01-30 PROCEDURE — 97110 THERAPEUTIC EXERCISES: CPT | Mod: GP | Performed by: PHYSICAL THERAPIST

## 2020-01-30 PROCEDURE — 97140 MANUAL THERAPY 1/> REGIONS: CPT | Mod: GP | Performed by: PHYSICAL THERAPIST

## 2020-02-06 ENCOUNTER — THERAPY VISIT (OUTPATIENT)
Dept: PHYSICAL THERAPY | Facility: CLINIC | Age: 65
End: 2020-02-06
Payer: OTHER GOVERNMENT

## 2020-02-06 DIAGNOSIS — M54.41 ACUTE RIGHT-SIDED LOW BACK PAIN WITH RIGHT-SIDED SCIATICA: Primary | ICD-10-CM

## 2020-02-06 PROCEDURE — 97140 MANUAL THERAPY 1/> REGIONS: CPT | Mod: GP | Performed by: PHYSICAL THERAPIST

## 2020-02-06 PROCEDURE — 97112 NEUROMUSCULAR REEDUCATION: CPT | Mod: GP | Performed by: PHYSICAL THERAPIST

## 2020-02-06 PROCEDURE — 97110 THERAPEUTIC EXERCISES: CPT | Mod: GP | Performed by: PHYSICAL THERAPIST

## 2020-02-24 ENCOUNTER — OFFICE VISIT (OUTPATIENT)
Dept: FAMILY MEDICINE | Facility: CLINIC | Age: 65
End: 2020-02-24
Payer: OTHER GOVERNMENT

## 2020-02-24 VITALS
SYSTOLIC BLOOD PRESSURE: 151 MMHG | RESPIRATION RATE: 24 BRPM | BODY MASS INDEX: 36.32 KG/M2 | TEMPERATURE: 98.1 F | WEIGHT: 218 LBS | HEART RATE: 90 BPM | DIASTOLIC BLOOD PRESSURE: 85 MMHG | OXYGEN SATURATION: 92 % | HEIGHT: 65 IN

## 2020-02-24 DIAGNOSIS — F17.200 SMOKER: ICD-10-CM

## 2020-02-24 DIAGNOSIS — E66.01 MORBID OBESITY (H): ICD-10-CM

## 2020-02-24 DIAGNOSIS — J18.9 PNEUMONIA OF LOWER LOBE DUE TO INFECTIOUS ORGANISM, UNSPECIFIED LATERALITY: Primary | ICD-10-CM

## 2020-02-24 PROCEDURE — 99214 OFFICE O/P EST MOD 30 MIN: CPT | Performed by: NURSE PRACTITIONER

## 2020-02-24 RX ORDER — PREDNISONE 20 MG/1
20 TABLET ORAL 2 TIMES DAILY
Qty: 10 TABLET | Refills: 0 | Status: SHIPPED | OUTPATIENT
Start: 2020-02-24 | End: 2020-06-01

## 2020-02-24 RX ORDER — ALBUTEROL SULFATE 90 UG/1
2 AEROSOL, METERED RESPIRATORY (INHALATION) EVERY 4 HOURS PRN
Qty: 1 INHALER | Refills: 0 | Status: SHIPPED | OUTPATIENT
Start: 2020-02-24 | End: 2020-06-01

## 2020-02-24 ASSESSMENT — MIFFLIN-ST. JEOR: SCORE: 1714.68

## 2020-02-24 ASSESSMENT — PAIN SCALES - GENERAL: PAINLEVEL: NO PAIN (0)

## 2020-02-24 NOTE — PROGRESS NOTES
Subjective     Mayur Sheth is a 65 year old male who presents to clinic today for the following health issues:    HPI   Acute Illness   Acute illness concerns: Cough/Shotness of Breath  Onset: 11 days     Fever: no    Chills/Sweats: YES    Headache (location?): no    Sinus Pressure:no    Conjunctivitis:  no    Ear Pain: no    Rhinorrhea: YES    Congestion: YES    Sore Throat: no     Cough: YES-productive of yellow sputum, with shortness of breath    Wheeze: YES    Decreased Appetite: YES    Nausea: no    Vomiting: no    Diarrhea:  no    Dysuria/Freq.: no    Fatigue/Achiness: YES    Sick/Strep Exposure: no     Therapies Tried and outcome: Nasal Spray and Mucinex       Patient Active Problem List   Diagnosis     Family history of ischemic heart disease     Hyperlipidemia LDL goal <100     Essential hypertension with goal blood pressure less than 140/90     Gastroesophageal reflux disease without esophagitis     Diverticulosis     Obesity, Class II, BMI 35-39.9, with comorbidity     Low back pain     AC (acromioclavicular) joint arthritis     Tubular adenoma of colon     Obesity (BMI 35.0-39.9) with comorbidity (H)     Prediabetes     Smoker     Past Surgical History:   Procedure Laterality Date     ARTHROSCOPY SHLDR ROTATOR CUFF REPAIR, SUBACROMIAL DECOMP, DIST CLAVICLE RESECTION, BICEP TENODESIS Left 12/14/2016    Procedure: ARTHROSCOPY SHOULDER ROTATOR CUFF REPAIR, SUBACROMIAL DECOMPRESSION, DISTAL CLAVICLE RESECTION, OPEN BICEP TENODESIS REPAIR;  Surgeon: Donell Brower MD;  Location: [facility] OR     C REPAIR CRUCIATE LIGAMENT,KNEE  ~1992    LT ACL     TONSILLECTOMY & ADENOIDECTOMY         Social History     Tobacco Use     Smoking status: Current Every Day Smoker     Packs/day: 1.00     Years: 45.00     Pack years: 45.00     Types: Cigarettes     Smokeless tobacco: Never Used     Tobacco comment: requit, uses Chantix successfully   Substance Use Topics     Alcohol use: Yes     Alcohol/week: 6.0 standard  drinks     Types: 6 Standard drinks or equivalent per week     Frequency: 2-4 times a month     Drinks per session: 1 or 2     Binge frequency: Never     Comment: 5 beers per month      Family History   Problem Relation Age of Onset     Hypertension Mother      Diabetes Mother      Heart Disease Mother         AFib     Myocardial Infarction Mother      Cerebrovascular Disease Mother      Heart Failure Mother              Hypertension Father      Myocardial Infarction Father 62             Diabetes Maternal Grandmother      Cancer - colorectal Paternal Grandmother      Diabetes Paternal Grandmother      Cerebrovascular Disease Paternal Grandmother      Myocardial Infarction Brother 43        MI, multiple     Psoriasis Sister      Thyroid Disease No family hx of      Glaucoma No family hx of      Macular Degeneration No family hx of      Anesthesia Reaction No family hx of      Thrombophilia No family hx of      Bleeding Disorder No family hx of          Current Outpatient Medications   Medication Sig Dispense Refill     albuterol (PROAIR HFA/PROVENTIL HFA/VENTOLIN HFA) 108 (90 Base) MCG/ACT inhaler Inhale 2 puffs into the lungs every 4 hours as needed for shortness of breath / dyspnea or wheezing 1 Inhaler 0     amLODIPine (NORVASC) 5 MG tablet Take 1 tablet (5 mg) by mouth daily 90 tablet 3     amoxicillin-clavulanate (AUGMENTIN) 875-125 MG tablet Take 1 tablet by mouth 2 times daily for 10 days 20 tablet 0     LORazepam (ATIVAN) 0.5 MG tablet Take 1 tablet (0.5 mg) by mouth every 6 hours as needed for anxiety 10 tablet 0     methocarbamol (ROBAXIN) 500 MG tablet Take 1 tablet (500 mg) by mouth 3 times daily as needed for muscle spasms 30 tablet 0     MULTI-VITAMIN OR TABS 1 TABLET DAILY       naproxen (NAPROSYN) 500 MG tablet Take 1 tablet (500 mg) by mouth 2 times daily (with meals) 30 tablet 0     pravastatin (PRAVACHOL) 80 MG tablet Take 1 tablet (80 mg) by mouth daily 90 tablet 3     predniSONE  "(DELTASONE) 20 MG tablet Take 1 tablet (20 mg) by mouth 2 times daily for 5 days 10 tablet 0     Probiotic Product (PROBIOTIC DAILY PO) Take 0.5 chew tab by mouth daily       triamterene-HCTZ (MAXZIDE) 75-50 MG tablet Take 1 tablet by mouth daily 90 tablet 3     Allergies   Allergen Reactions     No Clinical Screening - See Comments Anaphylaxis     Detergents,        Bee      Morphine      BP Readings from Last 3 Encounters:   02/24/20 (!) 151/85   01/23/20 122/82   10/21/19 134/70    Wt Readings from Last 3 Encounters:   02/24/20 98.9 kg (218 lb)   01/23/20 99.8 kg (220 lb 1.6 oz)   10/21/19 100.7 kg (222 lb)                      Reviewed and updated as needed this visit by Provider         Review of Systems   ROS COMP: Constitutional, HEENT, cardiovascular, pulmonary, gi and gu systems are negative, except as otherwise noted.      Objective    BP (!) 151/85 (BP Location: Left arm, Patient Position: Sitting, Cuff Size: Adult Large)   Pulse 90   Temp 98.1  F (36.7  C) (Oral)   Resp 24   Ht 1.657 m (5' 5.25\")   Wt 98.9 kg (218 lb)   SpO2 92%   BMI 36.00 kg/m    Body mass index is 36 kg/m .  Physical Exam   GENERAL: healthy, alert and no distress  HENT: normal cephalic/atraumatic, ear canals and TM's normal, nose and mouth without ulcers or lesions, nasal mucosa edematous , rhinorrhea clear, oropharynx clear, oral mucous membranes moist, tonsillar erythema and sinuses: not tender  NECK: no adenopathy, no asymmetry, masses, or scars and thyroid normal to palpation  RESP: no wheezes and rhonchi bibasilar  CV: regular rate and rhythm, normal S1 S2, no S3 or S4, no murmur, click or rub, no peripheral edema and peripheral pulses strong  ABDOMEN: soft, nontender, no hepatosplenomegaly, no masses and bowel sounds normal  MS: no gross musculoskeletal defects noted, no edema  PSYCH: mentation appears normal, affect normal/bright    Diagnostic Test Results:  No results found for this or any previous visit (from the past " 24 hour(s)).        Assessment & Plan     1. Pneumonia of lower lobe due to infectious organism, unspecified laterality (H)  Based on exam and symptoms, likely pneumonia.  Will treat as patient prefers not to do xray today.  emergency room and follow up precautions given.  To follow up in 1-2 weeks on blood pressure with PCP.    - amoxicillin-clavulanate (AUGMENTIN) 875-125 MG tablet; Take 1 tablet by mouth 2 times daily for 10 days  Dispense: 20 tablet; Refill: 0  - predniSONE (DELTASONE) 20 MG tablet; Take 1 tablet (20 mg) by mouth 2 times daily for 5 days  Dispense: 10 tablet; Refill: 0  - albuterol (PROAIR HFA/PROVENTIL HFA/VENTOLIN HFA) 108 (90 Base) MCG/ACT inhaler; Inhale 2 puffs into the lungs every 4 hours as needed for shortness of breath / dyspnea or wheezing  Dispense: 1 Inhaler; Refill: 0    2. Morbid obesity (H)  Lost some weight.  Praised on dietary changes.     3. Smoker  Advised cessation and discussion with PCP about lung cancer screening as well as discussion of likely COPD diagnosis.         Patient Instructions   Amoxicillin-clavulanate twice a day for 10 days. Prednisone twice a day 5 days.    Inhaler every 4 hours as needed for cough, wheezing, or shortness of breath.          Return in about 3 days (around 2/27/2020), or if symptoms worsen or fail to improve.    MARIJA Hwang [facility]

## 2020-02-24 NOTE — PATIENT INSTRUCTIONS
Amoxicillin-clavulanate twice a day for 10 days. Prednisone twice a day 5 days.    Inhaler every 4 hours as needed for cough, wheezing, or shortness of breath.

## 2020-04-07 NOTE — PROGRESS NOTES
Discharge Note    Progress reporting period is from initial evaluation date (please see noted date below) to Feb 6, 2020.  Linked Episodes   Type: Episode: Status: Noted: Resolved: Last update: Updated by:   PHYSICAL THERAPY low back pain 01-23-20 Active 1/23/2020 2/6/2020 11:01 AM Sneha Moseley, PT      Comments:       Mayur failed to follow up and current status is unknown.  Please see information below for last relevant information on current status.  Patient seen for 4 visits.    SUBJECTIVE  Subjective changes noted by patient:  Pt reports improvements with ability to sit for an hour at a time. He has not had any radiating symptoms since the weekend. This occurred with turning to side in bed.   .  Current pain level is 2/10.     Previous pain level was   .   Changes in function:  Yes (See Goal flowsheet attached for changes in current functional level)  Adverse reaction to treatment or activity: None    OBJECTIVE  Changes noted in objective findings: Lumbar AROM FB to mid lower leg,  BB 75%,      ASSESSMENT/PLAN  Diagnosis: low back pain, right sciatica   Updated problem list and treatment plan:   Decreased function - HEP  Decreased strength - HEP  STG/LTGs have been met or progress has been made towards goals:  Yes, please see goal flowsheet for most current information  Assessment of Progress: current status is unknown.    Last current status:     Self Management Plans:  HEP  I have re-evaluated this patient and find that the nature, scope, duration and intensity of the therapy is appropriate for the medical condition of the patient.  Mayur continues to require the following intervention to meet STG and LTG's:  HEP.    Recommendations:  Discharge with current home program.  Patient to follow up with MD as needed.    Please refer to the daily flowsheet for treatment today, total treatment time and time spent performing 1:1 timed codes.

## 2020-05-06 NOTE — PROGRESS NOTES
"SUBJECTIVE:   Mayur Sheth is a 65 year old male who is seen as self referral for evaluation of left knee injury.  Mechanism: not really an injury  He reports that the knee \"popped-out\" getting out of a chair 3 days ago,and since then has been very unstable.  Hasn't had issues with knee instability since his surgery.    Present symptoms: pain top of calf and anterior knee.  Swelling, mild.  Pain to flex knee, particularly actively.  Doesn't trust the knee with standing or walking.    Treatments tried to this point: none    Orthopedic PMH: rotator cuff repair 2016.  ACL RECONSTRUCTION left knee.  No surgery on the MCL.    Review of Systems:  Constitutional:  NEGATIVE for fever, chills, change in weight  Integumentary/Skin:  NEGATIVE for worrisome rashes, moles or lesions  Eyes:  NEGATIVE for vision changes or irritation  ENT/Mouth:  NEGATIVE for ear, mouth and throat problems  Resp:  NEGATIVE for significant cough or SOB  Breast:  NEGATIVE for masses, tenderness or discharge  CV:  NEGATIVE for chest pain, palpitations or peripheral edema  GI:  NEGATIVE for nausea, abdominal pain, heartburn, or change in bowel habits  :  Negative   Musculoskeletal:  See HPI above  Neuro:  NEGATIVE for weakness, dizziness or paresthesias  Endocrine:  NEGATIVE for temperature intolerance, skin/hair changes  Heme/allergy/immune:  NEGATIVE for bleeding problems  Psychiatric:  NEGATIVE for changes in mood or affect    Past Medical History:   Past Medical History:   Diagnosis Date     AC (acromioclavicular) joint arthritis 12/1/2016     Bee sting allergies      Complete tear of left rotator cuff 12/1/2016     Diverticulosis      Essential hypertension, benign 2006     GERD (gastroesophageal reflux disease)      Hyperlipidemia LDL goal <100     Fam Hx CAD     Obesity, Class II, BMI 35-39.9, with comorbidity 2/14/2013     Rotator cuff arthropathy, left      S/P rotator cuff repair 12/14/16    left     Tubular adenoma 3/24/08     Past " "Surgical History:   Past Surgical History:   Procedure Laterality Date     ARTHROSCOPY SHLDR ROTATOR CUFF REPAIR, SUBACROMIAL DECOMP, DIST CLAVICLE RESECTION, BICEP TENODESIS Left 2016    Procedure: ARTHROSCOPY SHOULDER ROTATOR CUFF REPAIR, SUBACROMIAL DECOMPRESSION, DISTAL CLAVICLE RESECTION, OPEN BICEP TENODESIS REPAIR;  Surgeon: Donell Brower MD;  Location: [facility]     C REPAIR CRUCIATE LIGAMENT,KNEE  ~    LT ACL     TONSILLECTOMY & ADENOIDECTOMY       Family History:   Family History   Problem Relation Age of Onset     Hypertension Mother      Diabetes Mother      Heart Disease Mother         AFib     Myocardial Infarction Mother      Cerebrovascular Disease Mother      Heart Failure Mother              Hypertension Father      Myocardial Infarction Father 62             Diabetes Maternal Grandmother      Cancer - colorectal Paternal Grandmother      Diabetes Paternal Grandmother      Cerebrovascular Disease Paternal Grandmother      Myocardial Infarction Brother 43        MI, multiple     Psoriasis Sister      Thyroid Disease No family hx of      Glaucoma No family hx of      Macular Degeneration No family hx of      Anesthesia Reaction No family hx of      Thrombophilia No family hx of      Bleeding Disorder No family hx of      Social History:   Social History     Tobacco Use     Smoking status: Current Every Day Smoker     Packs/day: 1.00     Years: 45.00     Pack years: 45.00     Types: Cigarettes     Smokeless tobacco: Never Used     Tobacco comment: requit, uses Chantix successfully   Substance Use Topics     Alcohol use: Yes     Alcohol/week: 6.0 standard drinks     Types: 6 Standard drinks or equivalent per week     Frequency: 2-4 times a month     Drinks per session: 1 or 2     Binge frequency: Never     Comment: 5 beers per month        OBJECTIVE:  Physical Exam:  BP (!) 160/84   Pulse 96   Ht 1.657 m (5' 5.25\")   Wt 99.6 kg (219 lb 9.6 oz)   BMI 36.26 kg/m    General " Appearance: healthy, alert and no distress   Skin: no suspicious lesions or rashes  Neuro: Normal strength and tone, mentation intact and speech normal  Vascular: good pulses, and cappillary refill   Lymph: no lymphadenopathy   Psych:  mentation appears normal and affect normal/bright  Resp: no increased work of breathing     Left Knee Exam:  Gait: walks with antalgic gait favoring left side   Alignment: normal   Squat: 50 % painful.    Patellofemoral joint: mild crepitations in the patellofemoral joint.  Extensor lag: none  Effusion: mild  ROM: 0-135*  Tender: medial joint line  Masses: none  Ligaments:   Lachman's: grade 3    Anterior Drawer: grade 3   Posterior drawer: 0    Varus/Valgus stress: stable   McMurrays: pain but no catching with hyperflexion    X-rays:  Obtained today of the left knee reviewed in the office with the patient today show mild lateral joint space narrowing.  ACL screws in place with the femoral screw at about 11:00-11:30, and a bit anterior.    MRI:    none     ASSESSMENT:   ACLDK left side.  I believe this is chronic, but he is adamant that this is a new problem for him and it feels like it did prior to his ACL RECONSTRUCTION many years ago.  But this was not a traumatic episode, either.  I think he has a meniscus tear, that may be giving him this feeling of instability    PLAN:   Knee brace  Wear as needed   Discussed exercises  We recommended an MRI of the knee.   I wouldn't rule out ACL RECONSTRUCTION revision, but I'm thinking that if he has a mechanically significant meniscus tear that the big surgery could be avoided.    Return to clinic: My chart, phone, or face-to-face visit for results    MARLENY Brower MD  Dept. Orthopedic Surgery  [facility]

## 2020-05-08 ENCOUNTER — MYC MEDICAL ADVICE (OUTPATIENT)
Dept: ORTHOPEDICS | Facility: CLINIC | Age: 65
End: 2020-05-08

## 2020-05-08 ENCOUNTER — TELEPHONE (OUTPATIENT)
Dept: ORTHOPEDICS | Facility: CLINIC | Age: 65
End: 2020-05-08

## 2020-05-08 ENCOUNTER — OFFICE VISIT (OUTPATIENT)
Dept: ORTHOPEDICS | Facility: CLINIC | Age: 65
End: 2020-05-08
Payer: OTHER GOVERNMENT

## 2020-05-08 ENCOUNTER — ANCILLARY PROCEDURE (OUTPATIENT)
Dept: GENERAL RADIOLOGY | Facility: CLINIC | Age: 65
End: 2020-05-08
Attending: ORTHOPAEDIC SURGERY
Payer: OTHER GOVERNMENT

## 2020-05-08 VITALS
SYSTOLIC BLOOD PRESSURE: 160 MMHG | BODY MASS INDEX: 36.59 KG/M2 | WEIGHT: 219.6 LBS | HEIGHT: 65 IN | HEART RATE: 96 BPM | DIASTOLIC BLOOD PRESSURE: 84 MMHG

## 2020-05-08 DIAGNOSIS — M25.562 ACUTE PAIN OF LEFT KNEE: ICD-10-CM

## 2020-05-08 DIAGNOSIS — Z98.890 HX OF ANTERIOR CRUCIATE LIGAMENT SURGERY: ICD-10-CM

## 2020-05-08 DIAGNOSIS — M23.8X2 DEFICIENCY OF ANTERIOR CRUCIATE LIGAMENT OF LEFT KNEE: Primary | ICD-10-CM

## 2020-05-08 DIAGNOSIS — M25.362 OTHER INSTABILITY, LEFT KNEE: ICD-10-CM

## 2020-05-08 DIAGNOSIS — S83.204A COMPLEX TEAR OF MENISCUS OF LEFT KNEE, UNSPECIFIED MENISCUS, UNSPECIFIED WHETHER OLD OR CURRENT TEAR, INITIAL ENCOUNTER: ICD-10-CM

## 2020-05-08 PROCEDURE — 99204 OFFICE O/P NEW MOD 45 MIN: CPT | Performed by: ORTHOPAEDIC SURGERY

## 2020-05-08 PROCEDURE — 73562 X-RAY EXAM OF KNEE 3: CPT | Mod: LT

## 2020-05-08 ASSESSMENT — PAIN SCALES - GENERAL: PAINLEVEL: MILD PAIN (3)

## 2020-05-08 ASSESSMENT — MIFFLIN-ST. JEOR: SCORE: 1721.94

## 2020-05-08 NOTE — LETTER
"    5/8/2020         RE: Mayur Sheth  [address] [place]  [place] [address]        Dear Colleague,    Thank you for referring your patient, Mayur Sheth, to the [facility]. Please see a copy of my visit note below.    SUBJECTIVE:   Mayur Sheth is a 65 year old male who is seen as self referral for evaluation of left knee injury.  Mechanism: not really an injury  He reports that the knee \"popped-out\" getting out of a chair 3 days ago,and since then has been very unstable.  Hasn't had issues with knee instability since his surgery.    Present symptoms: pain top of calf and anterior knee.  Swelling, mild.  Pain to flex knee, particularly actively.  Doesn't trust the knee with standing or walking.    Treatments tried to this point: none    Orthopedic PMH: rotator cuff repair 2016.  ACL RECONSTRUCTION left knee.  No surgery on the MCL.    Review of Systems:  Constitutional:  NEGATIVE for fever, chills, change in weight  Integumentary/Skin:  NEGATIVE for worrisome rashes, moles or lesions  Eyes:  NEGATIVE for vision changes or irritation  ENT/Mouth:  NEGATIVE for ear, mouth and throat problems  Resp:  NEGATIVE for significant cough or SOB  Breast:  NEGATIVE for masses, tenderness or discharge  CV:  NEGATIVE for chest pain, palpitations or peripheral edema  GI:  NEGATIVE for nausea, abdominal pain, heartburn, or change in bowel habits  :  Negative   Musculoskeletal:  See HPI above  Neuro:  NEGATIVE for weakness, dizziness or paresthesias  Endocrine:  NEGATIVE for temperature intolerance, skin/hair changes  Heme/allergy/immune:  NEGATIVE for bleeding problems  Psychiatric:  NEGATIVE for changes in mood or affect    Past Medical History:   Past Medical History:   Diagnosis Date     AC (acromioclavicular) joint arthritis 12/1/2016     Bee sting allergies      Complete tear of left rotator cuff 12/1/2016     Diverticulosis      Essential hypertension, benign 2006     GERD " (gastroesophageal reflux disease)      Hyperlipidemia LDL goal <100     Fam Hx CAD     Obesity, Class II, BMI 35-39.9, with comorbidity 2013     Rotator cuff arthropathy, left      S/P rotator cuff repair 16    left     Tubular adenoma 3/24/08     Past Surgical History:   Past Surgical History:   Procedure Laterality Date     ARTHROSCOPY SHLDR ROTATOR CUFF REPAIR, SUBACROMIAL DECOMP, DIST CLAVICLE RESECTION, BICEP TENODESIS Left 2016    Procedure: ARTHROSCOPY SHOULDER ROTATOR CUFF REPAIR, SUBACROMIAL DECOMPRESSION, DISTAL CLAVICLE RESECTION, OPEN BICEP TENODESIS REPAIR;  Surgeon: Donell Brower MD;  Location: [facility] OR     C REPAIR CRUCIATE LIGAMENT,KNEE  ~    LT ACL     TONSILLECTOMY & ADENOIDECTOMY       Family History:   Family History   Problem Relation Age of Onset     Hypertension Mother      Diabetes Mother      Heart Disease Mother         AFib     Myocardial Infarction Mother      Cerebrovascular Disease Mother      Heart Failure Mother              Hypertension Father      Myocardial Infarction Father 62             Diabetes Maternal Grandmother      Cancer - colorectal Paternal Grandmother      Diabetes Paternal Grandmother      Cerebrovascular Disease Paternal Grandmother      Myocardial Infarction Brother 43        MI, multiple     Psoriasis Sister      Thyroid Disease No family hx of      Glaucoma No family hx of      Macular Degeneration No family hx of      Anesthesia Reaction No family hx of      Thrombophilia No family hx of      Bleeding Disorder No family hx of      Social History:   Social History     Tobacco Use     Smoking status: Current Every Day Smoker     Packs/day: 1.00     Years: 45.00     Pack years: 45.00     Types: Cigarettes     Smokeless tobacco: Never Used     Tobacco comment: requit, uses Chantix successfully   Substance Use Topics     Alcohol use: Yes     Alcohol/week: 6.0 standard drinks     Types: 6 Standard drinks or equivalent per week      "Frequency: 2-4 times a month     Drinks per session: 1 or 2     Binge frequency: Never     Comment: 5 beers per month        OBJECTIVE:  Physical Exam:  BP (!) 160/84   Pulse 96   Ht 1.657 m (5' 5.25\")   Wt 99.6 kg (219 lb 9.6 oz)   BMI 36.26 kg/m    General Appearance: healthy, alert and no distress   Skin: no suspicious lesions or rashes  Neuro: Normal strength and tone, mentation intact and speech normal  Vascular: good pulses, and cappillary refill   Lymph: no lymphadenopathy   Psych:  mentation appears normal and affect normal/bright  Resp: no increased work of breathing     Left Knee Exam:  Gait: walks with antalgic gait favoring left side   Alignment: normal   Squat: 50 % painful.    Patellofemoral joint: mild crepitations in the patellofemoral joint.  Extensor lag: none  Effusion: mild  ROM: 0-135*  Tender: medial joint line  Masses: none  Ligaments:   Lachman's: grade 3    Anterior Drawer: grade 3   Posterior drawer: 0    Varus/Valgus stress: stable   McMurrays: pain but no catching with hyperflexion    X-rays:  Obtained today of the left knee reviewed in the office with the patient today show mild lateral joint space narrowing.  ACL screws in place with the femoral screw at about 11:00-11:30, and a bit anterior.    MRI:    none     ASSESSMENT:   ACLDK left side.  I believe this is chronic, but he is adamant that this is a new problem for him and it feels like it did prior to his ACL RECONSTRUCTION many years ago.  But this was not a traumatic episode, either.  I think he has a meniscus tear, that may be giving him this feeling of instability    PLAN:   Knee brace  Wear as needed   Discussed exercises  We recommended an MRI of the knee.   I wouldn't rule out ACL RECONSTRUCTION revision, but I'm thinking that if he has a mechanically significant meniscus tear that the big surgery could be avoided.    Return to clinic: My chart, phone, or face-to-face visit for results    MARLENY Brower MD  Dept. " Orthopedic Surgery  [facility]     Again, thank you for allowing me to participate in the care of your patient.        Sincerely,        Donell Brower MD

## 2020-05-08 NOTE — TELEPHONE ENCOUNTER
Reason for call:  Other   Patient called regarding (reason for call): call back  Additional comments:  Patient called to get the mri scheduled. He was told it was not emergent so they could schedule it in a month. Please clarify the order as to when he needs the mri.     Phone number to reach patient:  Home number on file [phone] (home)    Best Time:  Any     Can we leave a detailed message on this number?  YES    Travel screening: Not Applicable

## 2020-05-12 NOTE — TELEPHONE ENCOUNTER
This patient HAS MRi visit pending:   Name: Mayur Sheth MRN: [id number]   Date: 5/15/2020 Status: Scheduled   Time: 9:30 AM Length: 45   Visit Type: MR KNEE LEFT WO [[id number]]       P: Dr Brower can contact patient with results via [employer] as discussed at clinic visit. Davion BLACKBURN

## 2020-05-15 ENCOUNTER — ANCILLARY PROCEDURE (OUTPATIENT)
Dept: MRI IMAGING | Facility: CLINIC | Age: 65
End: 2020-05-15
Attending: ORTHOPAEDIC SURGERY
Payer: OTHER GOVERNMENT

## 2020-05-15 DIAGNOSIS — M25.362 OTHER INSTABILITY, LEFT KNEE: ICD-10-CM

## 2020-05-15 PROCEDURE — 73721 MRI JNT OF LWR EXTRE W/O DYE: CPT | Mod: TC

## 2020-05-20 ENCOUNTER — VIRTUAL VISIT (OUTPATIENT)
Dept: ORTHOPEDICS | Facility: CLINIC | Age: 65
End: 2020-05-20
Payer: OTHER GOVERNMENT

## 2020-05-20 DIAGNOSIS — M23.8X2 DEFICIENCY OF ANTERIOR CRUCIATE LIGAMENT OF LEFT KNEE: Primary | ICD-10-CM

## 2020-05-20 DIAGNOSIS — S83.242D OTHER TEAR OF MEDIAL MENISCUS OF LEFT KNEE, UNSPECIFIED WHETHER OLD OR CURRENT TEAR, SUBSEQUENT ENCOUNTER: ICD-10-CM

## 2020-05-20 PROCEDURE — 99214 OFFICE O/P EST MOD 30 MIN: CPT | Mod: 95 | Performed by: ORTHOPAEDIC SURGERY

## 2020-05-20 RX ORDER — CELECOXIB 200 MG/1
400 CAPSULE ORAL ONCE
Status: CANCELLED | OUTPATIENT
Start: 2020-05-20 | End: 2020-05-20

## 2020-05-20 RX ORDER — ACETAMINOPHEN 325 MG/1
975 TABLET ORAL ONCE
Status: CANCELLED | OUTPATIENT
Start: 2020-05-20 | End: 2020-05-20

## 2020-05-20 NOTE — PROGRESS NOTES
"Mayur Sheth is a 65 year old male who is being evaluated via a billable telephone visit.        He reports that the knee \"popped-out\" getting out of a chair, and since then has been very unstable.  Hasn't had issues with knee instability since his surgery.     Present symptoms: still has pain top of calf and anterior knee.  Swelling, mild.  Pain to flex knee, particularly actively.  Doesn't trust the knee with standing or walking.  Using a brace helps him do his work.    I have reviewed and updated the patient's Past Medical History, Social History, Family History and Medication List.    Review of Systems:  Constitutional/General: Negative for fever, chills, change in weight  Integumentary/Skin: Negative for worrisome rashes, moles, or lesions  Neuro: Negative for weakness, dizziness, or paresthesias   Psychiatric: negative for changes in mood or affect    The MRI results :  1.  Postsurgical changes of prior ACL reconstruction with high-grade  partial-thickness tear of the midsubstance of the ACL graft.  2.  Radial tear of the posterior horn and posterior horn/posterior  root junction of the medial meniscus with meniscal extrusion.  3.  Moderate-high grade cartilage fissuring the central weightbearing  femoral condyle of the medial compartment.  -- I don't think the medial articular cartilage is too bad.  4.  Low-grade cartilage fissuring of the medial patellar facet.      Impression:    Encounter Diagnoses   Name Primary?     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  The ACL has probably been non-functional for quite some time.  The radial tear in the posterior meniscus, near the root may be the cause of the new feeling of instability.   His options include revision ACL RECONSTRUCTION with allograft, arthroscopic debridement of the medial " "meniscus tear vs repair of the meniscus itself or the root.  The contra-indications to root repair have yet to be fully defined, but if beyond grade 2 changes in the affected compartment, root repair may be detrimental, particularly in regards to range of motion.  The medial meniscus provides some stability as well, and in those with acldk the combined instability can be great, and repair/reconstruction of only one of the 2 entities may put too much stress on that repair/reconstruction.    We have decided to proceed with revision ACL RECONSTRUCTION with allograft, and possible medial meniscal repair, possible medial meniscal root repair.  I would use the same tibial tunnel, which measures a max of 11mm, and a new femoral tunnel    The patient has been notified of following:     \"This telephone visit will be conducted via a call between you and your physician/provider. We have found that certain health care needs can be provided without the need for a physical exam.  This service lets us provide the care you need with a short phone conversation.  If a prescription is necessary we can send it directly to your pharmacy.  If lab work is needed we can place an order for that and you can then stop by our lab to have the test done at a later time.    If during the course of the call the physician/provider feels a telephone visit is not appropriate, you will not be charged for this service.\"     Phone call duration: 15 minutes      MARLENY Brower MD  Dept. Orthopedic Surgery  [facility]                 "

## 2020-05-21 ENCOUNTER — TELEPHONE (OUTPATIENT)
Dept: ORTHOPEDICS | Facility: CLINIC | Age: 65
End: 2020-05-21

## 2020-05-21 DIAGNOSIS — Z11.59 ENCOUNTER FOR SCREENING FOR OTHER VIRAL DISEASES: Primary | ICD-10-CM

## 2020-05-21 PROBLEM — S83.242D OTHER TEAR OF MEDIAL MENISCUS OF LEFT KNEE, UNSPECIFIED WHETHER OLD OR CURRENT TEAR, SUBSEQUENT ENCOUNTER: Status: ACTIVE | Noted: 2020-05-21

## 2020-05-21 PROBLEM — M23.8X2 DEFICIENCY OF ANTERIOR CRUCIATE LIGAMENT OF LEFT KNEE: Status: ACTIVE | Noted: 2020-05-21

## 2020-05-21 NOTE — TELEPHONE ENCOUNTER
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 Also not able to complete by calling [phone] and [phone]. I called patient to get information from insurance card but was told \"I am late for a meeting, please call me back\"    Date: 05/21/2020    Thank you,   Shelbi Grimes   Referral Department  [phone]    "

## 2020-05-22 NOTE — TELEPHONE ENCOUNTER
Call to patient,he gave me new number on the back of his card for prior auths [phone]. I called insurance (Ric) the auto message said that no prior auth needed, if medically neccessary.

## 2020-06-01 ENCOUNTER — OFFICE VISIT (OUTPATIENT)
Dept: FAMILY MEDICINE | Facility: CLINIC | Age: 65
End: 2020-06-01
Payer: OTHER GOVERNMENT

## 2020-06-01 VITALS
TEMPERATURE: 98.9 F | BODY MASS INDEX: 35.5 KG/M2 | WEIGHT: 215 LBS | RESPIRATION RATE: 18 BRPM | SYSTOLIC BLOOD PRESSURE: 139 MMHG | DIASTOLIC BLOOD PRESSURE: 84 MMHG | HEART RATE: 79 BPM | OXYGEN SATURATION: 95 %

## 2020-06-01 DIAGNOSIS — F17.200 SMOKER: ICD-10-CM

## 2020-06-01 DIAGNOSIS — I10 ESSENTIAL HYPERTENSION WITH GOAL BLOOD PRESSURE LESS THAN 140/90: ICD-10-CM

## 2020-06-01 DIAGNOSIS — E66.01 MORBID OBESITY (H): ICD-10-CM

## 2020-06-01 DIAGNOSIS — K21.9 GASTROESOPHAGEAL REFLUX DISEASE WITHOUT ESOPHAGITIS: ICD-10-CM

## 2020-06-01 DIAGNOSIS — Z01.818 PREOP GENERAL PHYSICAL EXAM: Primary | ICD-10-CM

## 2020-06-01 DIAGNOSIS — Z82.49 FAMILY HISTORY OF ISCHEMIC HEART DISEASE: ICD-10-CM

## 2020-06-01 DIAGNOSIS — M23.8X2 DEFICIENCY OF ANTERIOR CRUCIATE LIGAMENT OF LEFT KNEE: ICD-10-CM

## 2020-06-01 PROCEDURE — 99214 OFFICE O/P EST MOD 30 MIN: CPT | Performed by: NURSE PRACTITIONER

## 2020-06-01 PROCEDURE — 93000 ELECTROCARDIOGRAM COMPLETE: CPT | Performed by: NURSE PRACTITIONER

## 2020-06-01 RX ORDER — ASPIRIN 325 MG
325 TABLET, DELAYED RELEASE (ENTERIC COATED) ORAL DAILY
COMMUNITY
Start: 2020-06-01 | End: 2021-03-01

## 2020-06-01 NOTE — PATIENT INSTRUCTIONS
Before Your Surgery      Call your surgeon if there is any change in your health. This includes signs of a cold or flu (such as a sore throat, runny nose, cough, rash or fever).    Do not smoke, drink alcohol or take over the counter medicine (unless your surgeon or primary care doctor tells you to) for the 24 hours before and after surgery.    If you take prescribed drugs: Take all scheduled medications on the day of surgery.    Eating and drinking prior to surgery: follow the instructions from your surgeon     Take a shower or bath the night before surgery. Use the soap your surgeon gave you to gently clean your skin. If you do not have soap from your surgeon, use your regular soap. Do not shave or scrub the surgery site.  Wear clean pajamas and have clean sheets on your bed.

## 2020-06-01 NOTE — PROGRESS NOTES
[facility]  [address] [address]  [phone]  Dept: [phone]    PRE-OP EVALUATION:  Today's date: 2020    Mayur Sheth (: 1955) presents for pre-operative evaluation assessment as requested by Donell Meyers MD .  He requires evaluation and anesthesia risk assessment prior to undergoing surgery/procedure for treatment Revision ACL RECONSTRUCTION with allograft, and possible medial meniscal repair, possible medial meniscal root repair .    Proposed Surgery/ Procedure: Revision ACL RECONSTRUCTION with allograft, and possible medial meniscal repair, possible medial meniscal root repair   Date of Surgery/ Procedure: 2020  Time of Surgery/ Procedure: unknown  Hospital/Surgical Facility: [facility]    Fax number for surgical facility: unknown  Primary Physician: Kevin Ojeda  Type of Anesthesia Anticipated: to be determined    Patient has a Health Care Directive or Living Will:  NO    1. NO - Do you have a history of heart attack, stroke, stent, bypass or surgery on an artery in the head, neck, heart or legs?  2. NO - Do you ever have any pain or discomfort in your chest?  3. NO - Do you have a history of  Heart Failure?  4. NO - Are you troubled by shortness of breath when: walking on the level, up a slight hill or at night?  5. NO - Do you currently have a cold, bronchitis or other respiratory infection?  6. NO - Do you have a cough, shortness of breath or wheezing?  7. NO - Do you sometimes get pains in the calves of your legs when you walk?  8. NO - Do you or anyone in your family have previous history of blood clots?  9. NO - Do you or does anyone in your family have a serious bleeding problem such as prolonged bleeding following surgeries or cuts?  10. NO - Have you ever had problems with anemia or been told to take iron pills?  11. NO - Have you had any abnormal blood loss such as black, tarry or bloody stools, or abnormal  "vaginal bleeding?  12. NO - Have you ever had a blood transfusion?  13. NO - Have you or any of your relatives ever had problems with anesthesia?  14. NO - Do you have sleep apnea, excessive snoring or daytime drowsiness?  15. NO - Do you have any prosthetic heart valves?  16. NO - Do you have prosthetic joints?  17. NO - Is there any chance that you may be pregnant?      HPI:     HPI related to upcoming procedure: Distant history of ACL repair. Recently felt a \"Pop\" with subsequent pain and instability. MRI shows partial thickness ACLS graft tear.     HYPERLIPIDEMIA - Patient has a long history of significant Hyperlipidemia requiring medication for treatment with recent good control. Patient reports no problems or side effects with the medication.     HYPERTENSION - Patient has longstanding history of HTN , currently denies any symptoms referable to elevated blood pressure. Specifically denies chest pain, palpitations, dyspnea, orthopnea, PND or peripheral edema. Blood pressure readings have been in normal range. Current medication regimen is as listed below. Patient denies any side effects of medication.     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 05/21/2020     Priority: Medium     Added automatically from request for surgery [id number]       Other tear of medial meniscus of left knee, unspecified whether old or current tear, subsequent encounter 05/21/2020     Priority: Medium     Added automatically from request for surgery [id number]       Smoker 02/24/2020     Priority: Medium     Prediabetes 10/27/2019     Priority: Medium     Obesity (BMI 35.0-39.9) with comorbidity (H) 09/26/2018     Priority: Medium     Tubular adenoma of colon 02/20/2018     Priority: Medium     AC (acromioclavicular) joint arthritis 12/01/2016     Priority: Medium     Low back pain 12/04/2014     Priority: Medium     Obesity, Class II, BMI 35-39.9, with " comorbidity 02/14/2013     Priority: Medium     Family history of ischemic heart disease      Priority: Medium     Hyperlipidemia LDL goal <100      Priority: Medium     Fam Hx CAD       Essential hypertension with goal blood pressure less than 140/90      Priority: Medium     Gastroesophageal reflux disease without esophagitis      Priority: Medium     Diverticulosis      Priority: Medium      Past Medical History:   Diagnosis Date     AC (acromioclavicular) joint arthritis 12/1/2016     Bee sting allergies      Complete tear of left rotator cuff 12/1/2016     Diverticulosis      Essential hypertension, benign 2006     GERD (gastroesophageal reflux disease)      Hyperlipidemia LDL goal <100     Fam Hx CAD     Medial meniscus tear     L knee     Obesity, Class II, BMI 35-39.9, with comorbidity 2/14/2013     Rotator cuff arthropathy, left      S/P rotator cuff repair 12/14/16    left     Tubular adenoma 3/24/08     Past Surgical History:   Procedure Laterality Date     ARTHROSCOPY SHLDR ROTATOR CUFF REPAIR, SUBACROMIAL DECOMP, DIST CLAVICLE RESECTION, BICEP TENODESIS Left 12/14/2016    Procedure: ARTHROSCOPY SHOULDER ROTATOR CUFF REPAIR, SUBACROMIAL DECOMPRESSION, DISTAL CLAVICLE RESECTION, OPEN BICEP TENODESIS REPAIR;  Surgeon: Donell Brower MD;  Location: [facility] OR     C REPAIR CRUCIATE LIGAMENT,KNEE  ~1992    LT ACL     TONSILLECTOMY & ADENOIDECTOMY       Current Outpatient Medications   Medication Sig Dispense Refill     albuterol (PROAIR HFA/PROVENTIL HFA/VENTOLIN HFA) 108 (90 Base) MCG/ACT inhaler Inhale 2 puffs into the lungs every 4 hours as needed for shortness of breath / dyspnea or wheezing 1 Inhaler 0     amLODIPine (NORVASC) 5 MG tablet Take 1 tablet (5 mg) by mouth daily 90 tablet 3     LORazepam (ATIVAN) 0.5 MG tablet Take 1 tablet (0.5 mg) by mouth every 6 hours as needed for anxiety 10 tablet 0     methocarbamol (ROBAXIN) 500 MG tablet Take 1 tablet (500 mg) by mouth 3 times daily as needed for  muscle spasms 30 tablet 0     MULTI-VITAMIN OR TABS 1 TABLET DAILY       naproxen (NAPROSYN) 500 MG tablet Take 1 tablet (500 mg) by mouth 2 times daily (with meals) 30 tablet 0     pravastatin (PRAVACHOL) 80 MG tablet Take 1 tablet (80 mg) by mouth daily 90 tablet 3     Probiotic Product (PROBIOTIC DAILY PO) Take 0.5 chew tab by mouth daily       triamterene-HCTZ (MAXZIDE) 75-50 MG tablet Take 1 tablet by mouth daily 90 tablet 3     OTC products: None, except as noted above    Allergies   Allergen Reactions     No Clinical Screening - See Comments Anaphylaxis     Detergents,        Bee      Morphine       Latex Allergy: NO    Social History     Tobacco Use     Smoking status: Current Every Day Smoker     Packs/day: 1.00     Years: 45.00     Pack years: 45.00     Types: Cigarettes     Smokeless tobacco: Never Used     Tobacco comment: requit, uses Chantix successfully   Substance Use Topics     Alcohol use: Yes     Alcohol/week: 6.0 standard drinks     Types: 6 Standard drinks or equivalent per week     Frequency: 2-4 times a month     Drinks per session: 1 or 2     Binge frequency: Never     Comment: 5 beers per month      History   Drug Use No       REVIEW OF SYSTEMS:   CONSTITUTIONAL: NEGATIVE for fever, chills, change in weight  INTEGUMENTARY/SKIN: NEGATIVE for worrisome rashes, moles or lesions  EYES: NEGATIVE for vision changes or irritation  ENT/MOUTH: NEGATIVE for ear, mouth and throat problems  RESP: NEGATIVE for significant cough or SOB  BREAST: NEGATIVE for masses, tenderness or discharge  CV: NEGATIVE for chest pain, palpitations or peripheral edema  GI: NEGATIVE for nausea, abdominal pain, heartburn, or change in bowel habits  : NEGATIVE for frequency, dysuria, or hematuria  MUSCULOSKELETAL:POSITIVE  for joint pain left knee  NEURO: NEGATIVE for weakness, dizziness or paresthesias  ENDOCRINE: NEGATIVE for temperature intolerance, skin/hair changes  HEME: NEGATIVE for bleeding problems  PSYCHIATRIC:  NEGATIVE for changes in mood or affect    EXAM:   /84   Pulse 79   Temp 98.9  F (37.2  C) (Oral)   Resp 18   Wt 97.5 kg (215 lb)   SpO2 95%   BMI 35.50 kg/m      GENERAL APPEARANCE: healthy, alert and no distress     EYES: EOMI,  PERRL     HENT: ear canals and TM's normal and nose and mouth without ulcers or lesions     NECK: no adenopathy, no asymmetry, masses, or scars and thyroid normal to palpation     RESP: lungs clear to auscultation - no rales, rhonchi or wheezes     CV: regular rates and rhythm, normal S1 S2, no S3 or S4 and no murmur, click or rub     ABDOMEN:  soft, nontender, no HSM or masses and bowel sounds normal     MS: tenderness left knee     SKIN: no suspicious lesions or rashes     NEURO: Normal strength and tone, sensory exam grossly normal, mentation intact and speech normal     PSYCH: mentation appears normal. and affect normal/bright     LYMPHATICS: No cervical adenopathy    DIAGNOSTICS:   EKG: appears normal, NSR, normal axis, normal intervals, no acute ST/T changes c/w ischemia, no LVH by voltage criteria, unchanged from previous tracings    Recent Labs   Lab Test 10/21/19  0832 09/26/18  0836    139   POTASSIUM 3.8 3.9   CR 0.89 0.93        IMPRESSION:   Reason for surgery/procedure: deficiency of ACL left knee    The proposed surgical procedure is considered LOW risk.    REVISED CARDIAC RISK INDEX  The patient has the following serious cardiovascular risks for perioperative complications such as (MI, PE, VFib and 3  AV Block):  No serious cardiac risks  INTERPRETATION: 1 risks: Class II (low risk - 0.9% complication rate)    The patient has the following additional risks for perioperative complications:  Morbid obesity  Tobacco use      ICD-10-CM    1. Preop general physical exam  Z01.818    2. 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 Obesity (BMI 35.0-39.9) with comorbidity (H)  E66.01    4. Gastroesophageal reflux disease without esophagitis   K21.9    5. Essential hypertension with goal blood pressure less than 140/90  I10 EKG 12-lead complete w/read - Clinics   6. Family history of ischemic heart disease  Z82.49 EKG 12-lead complete w/read - Clinics   7. Smoker  F17.200 EKG 12-lead complete w/read - Clinics       RECOMMENDATIONS:     Pulmonary Risk  Advised smoking cessation.     --Patient is to take all scheduled medications on the day of surgery    Pending review of diagnostic evaluation, APPROVAL GIVEN to proceed with proposed procedure, without further diagnostic evaluation.       Signed Electronically by: MARIJA Grayson CNP    Copy of this evaluation report is provided to requesting physician.    [facility] Preop Guidelines    Revised Cardiac Risk Index

## 2020-06-09 DIAGNOSIS — Z11.59 ENCOUNTER FOR SCREENING FOR OTHER VIRAL DISEASES: ICD-10-CM

## 2020-06-09 PROCEDURE — 99207 ZZC NO BILLABLE SERVICE THIS VISIT: CPT

## 2020-06-09 PROCEDURE — 99000 SPECIMEN HANDLING OFFICE-LAB: CPT | Performed by: ORTHOPAEDIC SURGERY

## 2020-06-10 LAB
SARS-COV-2 RNA SPEC QL NAA+PROBE: NOT DETECTED
SPECIMEN SOURCE: NORMAL

## 2020-06-11 ENCOUNTER — ANESTHESIA EVENT (OUTPATIENT)
Dept: SURGERY | Facility: AMBULATORY SURGERY CENTER | Age: 65
End: 2020-06-11

## 2020-06-12 ENCOUNTER — HOSPITAL ENCOUNTER (OUTPATIENT)
Facility: AMBULATORY SURGERY CENTER | Age: 65
Discharge: HOME | End: 2020-06-12
Attending: ORTHOPAEDIC SURGERY | Admitting: ORTHOPAEDIC SURGERY
Payer: OTHER GOVERNMENT

## 2020-06-12 ENCOUNTER — ANESTHESIA (OUTPATIENT)
Dept: SURGERY | Facility: AMBULATORY SURGERY CENTER | Age: 65
End: 2020-06-12
Payer: OTHER GOVERNMENT

## 2020-06-12 ENCOUNTER — ANCILLARY PROCEDURE (OUTPATIENT)
Dept: GENERAL RADIOLOGY | Facility: CLINIC | Age: 65
End: 2020-06-12
Attending: ORTHOPAEDIC SURGERY
Payer: OTHER GOVERNMENT

## 2020-06-12 VITALS
SYSTOLIC BLOOD PRESSURE: 129 MMHG | TEMPERATURE: 98 F | DIASTOLIC BLOOD PRESSURE: 77 MMHG | HEART RATE: 80 BPM | RESPIRATION RATE: 20 BRPM | OXYGEN SATURATION: 94 %

## 2020-06-12 DIAGNOSIS — S83.242D OTHER TEAR OF MEDIAL MENISCUS OF LEFT KNEE, UNSPECIFIED WHETHER OLD OR CURRENT TEAR, SUBSEQUENT ENCOUNTER: ICD-10-CM

## 2020-06-12 DIAGNOSIS — M23.8X2 DEFICIENCY OF ANTERIOR CRUCIATE LIGAMENT OF LEFT KNEE: ICD-10-CM

## 2020-06-12 DIAGNOSIS — M25.569 KNEE PAIN, UNSPECIFIED CHRONICITY, UNSPECIFIED LATERALITY: ICD-10-CM

## 2020-06-12 DIAGNOSIS — Z98.890 S/P ACL RECONSTRUCTION: Primary | ICD-10-CM

## 2020-06-12 PROCEDURE — 29888 ARTHRS AID ACL RPR/AGMNTJ: CPT | Mod: LT | Performed by: ORTHOPAEDIC SURGERY

## 2020-06-12 PROCEDURE — G8907 PT DOC NO EVENTS ON DISCHARG: HCPCS

## 2020-06-12 PROCEDURE — G8916 PT W IV AB GIVEN ON TIME: HCPCS

## 2020-06-12 PROCEDURE — 29881 ARTHRS KNE SRG MNISECTMY M/L: CPT | Mod: LT

## 2020-06-12 PROCEDURE — 29888 ARTHRS AID ACL RPR/AGMNTJ: CPT | Mod: LT

## 2020-06-12 PROCEDURE — 29881 ARTHRS KNE SRG MNISECTMY M/L: CPT | Mod: 59 | Performed by: ORTHOPAEDIC SURGERY

## 2020-06-12 DEVICE — IMPLANTABLE DEVICE: Type: IMPLANTABLE DEVICE | Site: KNEE | Status: FUNCTIONAL

## 2020-06-12 RX ORDER — NALOXONE HYDROCHLORIDE 0.4 MG/ML
.1-.4 INJECTION, SOLUTION INTRAMUSCULAR; INTRAVENOUS; SUBCUTANEOUS
Status: DISCONTINUED | OUTPATIENT
Start: 2020-06-12 | End: 2020-06-13 | Stop reason: HOSPADM

## 2020-06-12 RX ORDER — CELECOXIB 200 MG/1
200 CAPSULE ORAL
Status: DISCONTINUED | OUTPATIENT
Start: 2020-06-12 | End: 2020-06-13 | Stop reason: HOSPADM

## 2020-06-12 RX ORDER — OXYCODONE AND ACETAMINOPHEN 5; 325 MG/1; MG/1
1-2 TABLET ORAL EVERY 4 HOURS PRN
Qty: 30 TABLET | Refills: 0 | Status: SHIPPED | OUTPATIENT
Start: 2020-06-12 | End: 2020-11-05

## 2020-06-12 RX ORDER — KETOROLAC TROMETHAMINE 30 MG/ML
30 INJECTION, SOLUTION INTRAMUSCULAR; INTRAVENOUS EVERY 6 HOURS PRN
Status: DISCONTINUED | OUTPATIENT
Start: 2020-06-12 | End: 2020-06-13 | Stop reason: HOSPADM

## 2020-06-12 RX ORDER — LIDOCAINE HYDROCHLORIDE 20 MG/ML
INJECTION, SOLUTION INFILTRATION; PERINEURAL PRN
Status: DISCONTINUED | OUTPATIENT
Start: 2020-06-12 | End: 2020-06-12

## 2020-06-12 RX ORDER — VARENICLINE TARTRATE 1 MG/1
1 TABLET, FILM COATED ORAL 2 TIMES DAILY
COMMUNITY
End: 2020-07-22

## 2020-06-12 RX ORDER — CEFAZOLIN SODIUM 2 G/100ML
2 INJECTION, SOLUTION INTRAVENOUS
Status: COMPLETED | OUTPATIENT
Start: 2020-06-12 | End: 2020-06-12

## 2020-06-12 RX ORDER — ONDANSETRON 2 MG/ML
INJECTION INTRAMUSCULAR; INTRAVENOUS PRN
Status: DISCONTINUED | OUTPATIENT
Start: 2020-06-12 | End: 2020-06-12

## 2020-06-12 RX ORDER — ALBUTEROL SULFATE 0.83 MG/ML
2.5 SOLUTION RESPIRATORY (INHALATION) EVERY 4 HOURS PRN
Status: DISCONTINUED | OUTPATIENT
Start: 2020-06-12 | End: 2020-06-13 | Stop reason: HOSPADM

## 2020-06-12 RX ORDER — LIDOCAINE 40 MG/G
CREAM TOPICAL
Status: DISCONTINUED | OUTPATIENT
Start: 2020-06-12 | End: 2020-06-13 | Stop reason: HOSPADM

## 2020-06-12 RX ORDER — LABETALOL 20 MG/4 ML (5 MG/ML) INTRAVENOUS SYRINGE
PRN
Status: DISCONTINUED | OUTPATIENT
Start: 2020-06-12 | End: 2020-06-12

## 2020-06-12 RX ORDER — ONDANSETRON 4 MG/1
4 TABLET, ORALLY DISINTEGRATING ORAL EVERY 30 MIN PRN
Status: DISCONTINUED | OUTPATIENT
Start: 2020-06-12 | End: 2020-06-13 | Stop reason: HOSPADM

## 2020-06-12 RX ORDER — MEPERIDINE HYDROCHLORIDE 25 MG/ML
12.5 INJECTION INTRAMUSCULAR; INTRAVENOUS; SUBCUTANEOUS
Status: DISCONTINUED | OUTPATIENT
Start: 2020-06-12 | End: 2020-06-13 | Stop reason: HOSPADM

## 2020-06-12 RX ORDER — CEFAZOLIN SODIUM 1 G/3ML
1 INJECTION, POWDER, FOR SOLUTION INTRAMUSCULAR; INTRAVENOUS SEE ADMIN INSTRUCTIONS
Status: DISCONTINUED | OUTPATIENT
Start: 2020-06-12 | End: 2020-06-13 | Stop reason: HOSPADM

## 2020-06-12 RX ORDER — ONDANSETRON 2 MG/ML
4 INJECTION INTRAMUSCULAR; INTRAVENOUS EVERY 30 MIN PRN
Status: DISCONTINUED | OUTPATIENT
Start: 2020-06-12 | End: 2020-06-13 | Stop reason: HOSPADM

## 2020-06-12 RX ORDER — DEXAMETHASONE SODIUM PHOSPHATE 4 MG/ML
4 INJECTION, SOLUTION INTRA-ARTICULAR; INTRALESIONAL; INTRAMUSCULAR; INTRAVENOUS; SOFT TISSUE EVERY 10 MIN PRN
Status: DISCONTINUED | OUTPATIENT
Start: 2020-06-12 | End: 2020-06-13 | Stop reason: HOSPADM

## 2020-06-12 RX ORDER — PROPOFOL 10 MG/ML
INJECTION, EMULSION INTRAVENOUS PRN
Status: DISCONTINUED | OUTPATIENT
Start: 2020-06-12 | End: 2020-06-12

## 2020-06-12 RX ORDER — PHENYLEPHRINE HYDROCHLORIDE 10 MG/ML
INJECTION INTRAVENOUS PRN
Status: DISCONTINUED | OUTPATIENT
Start: 2020-06-12 | End: 2020-06-12

## 2020-06-12 RX ORDER — OXYCODONE HYDROCHLORIDE 5 MG/1
5-10 TABLET ORAL EVERY 4 HOURS PRN
Status: DISCONTINUED | OUTPATIENT
Start: 2020-06-12 | End: 2020-06-13 | Stop reason: HOSPADM

## 2020-06-12 RX ORDER — SODIUM CHLORIDE, SODIUM LACTATE, POTASSIUM CHLORIDE, CALCIUM CHLORIDE 600; 310; 30; 20 MG/100ML; MG/100ML; MG/100ML; MG/100ML
INJECTION, SOLUTION INTRAVENOUS CONTINUOUS
Status: DISCONTINUED | OUTPATIENT
Start: 2020-06-12 | End: 2020-06-13 | Stop reason: HOSPADM

## 2020-06-12 RX ORDER — ACETAMINOPHEN 325 MG/1
975 TABLET ORAL ONCE
Status: COMPLETED | OUTPATIENT
Start: 2020-06-12 | End: 2020-06-12

## 2020-06-12 RX ORDER — FENTANYL CITRATE 50 UG/ML
25-50 INJECTION, SOLUTION INTRAMUSCULAR; INTRAVENOUS
Status: DISCONTINUED | OUTPATIENT
Start: 2020-06-12 | End: 2020-06-13 | Stop reason: HOSPADM

## 2020-06-12 RX ORDER — KETAMINE HYDROCHLORIDE 10 MG/ML
INJECTION INTRAMUSCULAR; INTRAVENOUS PRN
Status: DISCONTINUED | OUTPATIENT
Start: 2020-06-12 | End: 2020-06-12

## 2020-06-12 RX ORDER — OXYCODONE HYDROCHLORIDE 5 MG/1
5 TABLET ORAL
Status: DISCONTINUED | OUTPATIENT
Start: 2020-06-12 | End: 2020-06-13 | Stop reason: HOSPADM

## 2020-06-12 RX ORDER — BUPIVACAINE HYDROCHLORIDE 2.5 MG/ML
INJECTION, SOLUTION EPIDURAL; INFILTRATION; INTRACAUDAL PRN
Status: DISCONTINUED | OUTPATIENT
Start: 2020-06-12 | End: 2020-06-12

## 2020-06-12 RX ORDER — HYDROXYZINE PAMOATE 25 MG/1
CAPSULE ORAL
Qty: 50 CAPSULE | Refills: 0 | Status: SHIPPED | OUTPATIENT
Start: 2020-06-12 | End: 2020-06-12

## 2020-06-12 RX ORDER — HYDROXYZINE HYDROCHLORIDE 25 MG/1
25 TABLET, FILM COATED ORAL
Status: DISCONTINUED | OUTPATIENT
Start: 2020-06-12 | End: 2020-06-13 | Stop reason: HOSPADM

## 2020-06-12 RX ORDER — BUPIVACAINE HYDROCHLORIDE AND EPINEPHRINE 2.5; 5 MG/ML; UG/ML
INJECTION, SOLUTION INFILTRATION; PERINEURAL PRN
Status: DISCONTINUED | OUTPATIENT
Start: 2020-06-12 | End: 2020-06-12 | Stop reason: HOSPADM

## 2020-06-12 RX ORDER — FLUMAZENIL 0.1 MG/ML
0.2 INJECTION, SOLUTION INTRAVENOUS
Status: DISCONTINUED | OUTPATIENT
Start: 2020-06-12 | End: 2020-06-13 | Stop reason: HOSPADM

## 2020-06-12 RX ORDER — DEXAMETHASONE SODIUM PHOSPHATE 4 MG/ML
INJECTION, SOLUTION INTRA-ARTICULAR; INTRALESIONAL; INTRAMUSCULAR; INTRAVENOUS; SOFT TISSUE PRN
Status: DISCONTINUED | OUTPATIENT
Start: 2020-06-12 | End: 2020-06-12

## 2020-06-12 RX ORDER — HYDROMORPHONE HYDROCHLORIDE 1 MG/ML
.3-.5 INJECTION, SOLUTION INTRAMUSCULAR; INTRAVENOUS; SUBCUTANEOUS EVERY 10 MIN PRN
Status: DISCONTINUED | OUTPATIENT
Start: 2020-06-12 | End: 2020-06-13 | Stop reason: HOSPADM

## 2020-06-12 RX ORDER — CELECOXIB 200 MG/1
400 CAPSULE ORAL ONCE
Status: COMPLETED | OUTPATIENT
Start: 2020-06-12 | End: 2020-06-12

## 2020-06-12 RX ORDER — ACETAMINOPHEN 325 MG/1
975 TABLET ORAL ONCE
Status: DISCONTINUED | OUTPATIENT
Start: 2020-06-12 | End: 2020-06-13 | Stop reason: HOSPADM

## 2020-06-12 RX ORDER — HYDROXYZINE PAMOATE 25 MG/1
CAPSULE ORAL
Qty: 50 CAPSULE | Refills: 0 | Status: SHIPPED | OUTPATIENT
Start: 2020-06-12 | End: 2020-11-05

## 2020-06-12 RX ADMIN — PROPOFOL 250 MG: 10 INJECTION, EMULSION INTRAVENOUS at 07:15

## 2020-06-12 RX ADMIN — CEFAZOLIN SODIUM 2 G: 2 INJECTION, SOLUTION INTRAVENOUS at 07:17

## 2020-06-12 RX ADMIN — ONDANSETRON 4 MG: 2 INJECTION INTRAMUSCULAR; INTRAVENOUS at 09:46

## 2020-06-12 RX ADMIN — PHENYLEPHRINE HYDROCHLORIDE 100 MCG: 10 INJECTION INTRAVENOUS at 08:11

## 2020-06-12 RX ADMIN — FENTANYL CITRATE 50 MCG: 50 INJECTION, SOLUTION INTRAMUSCULAR; INTRAVENOUS at 07:03

## 2020-06-12 RX ADMIN — CELECOXIB 400 MG: 200 CAPSULE ORAL at 06:12

## 2020-06-12 RX ADMIN — ACETAMINOPHEN 975 MG: 325 TABLET ORAL at 06:12

## 2020-06-12 RX ADMIN — ONDANSETRON 4 MG: 2 INJECTION INTRAMUSCULAR; INTRAVENOUS at 11:33

## 2020-06-12 RX ADMIN — LIDOCAINE HYDROCHLORIDE 100 MG: 20 INJECTION, SOLUTION INFILTRATION; PERINEURAL at 07:16

## 2020-06-12 RX ADMIN — Medication 0.2 MCG/KG/MIN: at 08:17

## 2020-06-12 RX ADMIN — DEXAMETHASONE SODIUM PHOSPHATE 4 MG: 4 INJECTION, SOLUTION INTRA-ARTICULAR; INTRALESIONAL; INTRAMUSCULAR; INTRAVENOUS; SOFT TISSUE at 07:18

## 2020-06-12 RX ADMIN — CEFAZOLIN SODIUM 1 G: 2 INJECTION, SOLUTION INTRAVENOUS at 09:17

## 2020-06-12 RX ADMIN — FENTANYL CITRATE 50 MCG: 50 INJECTION, SOLUTION INTRAMUSCULAR; INTRAVENOUS at 06:57

## 2020-06-12 RX ADMIN — OXYCODONE HYDROCHLORIDE 5 MG: 5 TABLET ORAL at 10:23

## 2020-06-12 RX ADMIN — PHENYLEPHRINE HYDROCHLORIDE 100 MCG: 10 INJECTION INTRAVENOUS at 08:14

## 2020-06-12 RX ADMIN — SODIUM CHLORIDE, SODIUM LACTATE, POTASSIUM CHLORIDE, CALCIUM CHLORIDE: 600; 310; 30; 20 INJECTION, SOLUTION INTRAVENOUS at 06:27

## 2020-06-12 RX ADMIN — LABETALOL 20 MG/4 ML (5 MG/ML) INTRAVENOUS SYRINGE 10 MG: at 09:16

## 2020-06-12 RX ADMIN — FENTANYL CITRATE 25 MCG: 50 INJECTION, SOLUTION INTRAMUSCULAR; INTRAVENOUS at 08:58

## 2020-06-12 RX ADMIN — LIDOCAINE HYDROCHLORIDE 60 MG: 20 INJECTION, SOLUTION INFILTRATION; PERINEURAL at 07:15

## 2020-06-12 RX ADMIN — BUPIVACAINE HYDROCHLORIDE 10 ML: 2.5 INJECTION, SOLUTION EPIDURAL; INFILTRATION; INTRACAUDAL at 06:55

## 2020-06-12 RX ADMIN — LABETALOL 20 MG/4 ML (5 MG/ML) INTRAVENOUS SYRINGE 10 MG: at 07:46

## 2020-06-12 RX ADMIN — KETAMINE HYDROCHLORIDE 50 MG: 10 INJECTION INTRAMUSCULAR; INTRAVENOUS at 09:01

## 2020-06-12 RX ADMIN — SODIUM CHLORIDE, SODIUM LACTATE, POTASSIUM CHLORIDE, CALCIUM CHLORIDE: 600; 310; 30; 20 INJECTION, SOLUTION INTRAVENOUS at 07:00

## 2020-06-12 ASSESSMENT — LIFESTYLE VARIABLES: TOBACCO_USE: 1

## 2020-06-12 NOTE — OR NURSING
Pt had left adductor canal block with exparel done pre op without complications tolerated the procedure well

## 2020-06-12 NOTE — DISCHARGE INSTRUCTIONS
[facility]   Adult Discharge Orders & Instructions     For 24 hours after surgery    1. Get plenty of rest.  A responsible adult must stay with you for at least 24 hours after you leave the hospital.   2. Do not drive or use heavy equipment.  If you have weakness or tingling, don't drive or use heavy equipment until this feeling goes away.  3. Do not drink alcohol.  4. Avoid strenuous or risky activities.  Ask for help when climbing stairs.   5. You may feel lightheaded.  IF so, sit for a few minutes before standing.  Have someone help you get up.   6. If you have nausea (feel sick to your stomach): Drink only clear liquids such as apple juice, ginger ale, broth or 7-Up.  Rest may also help.  Be sure to drink enough fluids.  Move to a regular diet as you feel able.  7. You may have a slight fever. Call the doctor if your fever is over 100 F (37.7 C) (taken under the tongue) or lasts longer than 24 hours.  8. You may have a dry mouth, a sore throat, muscle aches or trouble sleeping.  These should go away after 24 hours.  9. Do not make important or legal decisions.     Call your doctor for any of the followin.  Signs of infection (fever, growing tenderness at the surgery site, a large amount of drainage or bleeding, severe pain, foul-smelling drainage, redness, swelling).    2. It has been over 8 to 10 hours since surgery and you are still not able to urinate (pass water).    3.  Headache for over 24 hours.                  4. Signs of Covid-19 infection (temperature over 100 degrees, shortness of breath, cough, loss of taste/smell, generalized body aches, persistent headache,                  chills, sore throat, nausea/vomiting/diarrhea).    To contact a doctor, call   To contact Dr Brower call:  [phone]    **Acetaminophen (Tylenol)  975mg taken at 6:00am.   **May resume Ibuprofen tomorrow morning 2010.   **Oxycodone 5mg taken at 9:30am.       Information about liposomal  "bupivacaine (Exparel)    What is Liposomal Bupivacaine?    Liposomal Bupivacaine is a numbing medication that can help you manage your pain after surgery.  This medication is similar to \"novacaine,\" which is often used by the dentist.  Liposomal bupivacaine is released slowly and can help control pain for up to 72 hours.    What is the purpose of Liposomal Bupivacaine?    To manage your pain after surgery    To help you sleep better, take deep breaths, walk more comfortable, and feel up to visiting with others    How is the procedure done?    Liposomal bupivacaine is a medication given by an injection.    It is usually given right before your surgery.  If this is the case, you will be awake or sedated, but you should experience minimal pain during the procedure.    For some people, the injection may be given at the very end of your surgery.  It all depends on the type of surgery and your situation.    The procedure usually takes about 5-15 minutes.  An ultrasound machine will help the anesthesiologist insert it in the right place or the surgeon will inject it under direct vision.     A needle is used to place the numbing medication under your skin.  It provides pain relief by numbing the tissue in the area where your surgeon will make the incision.    What can I expect?    You may experience numbness, tingling, or a feeling of heaviness around the area that was injected.    If you experience any of the follow symptoms IMMEDIATELY CALL THE REGIONAL ANESTHESIA PAIN SERVICE:    Numbness or tingling occurs in areas other than around the injection site    Blurry vision    Ringing in your ears    A metallic taste in your mouth    PAGE: Dial [phone].  When prompted, enter the following 4-digit ID number:  [id number].  You will be prompted to enter your phone number; and then enter the # sign.  The clinician on call will call you back.    OR    CALL: Dial [phone].  Let the hospital  know that you are having a " problem with a nerve block and that you would like to speak to the regional anesthesia pain service right away.    You should not receive any other type of numbing medication within 4 days after receiving liposomal bupivacaine unless your anesthesiologist approves.    Post Operative Instructions: Regional Anesthetic for Lower Extremity with Liposomal Bupivacaine  General Information:   Regional anesthesia is when local anesthetic or  numbing  medication is injected around the nerves to anesthetize or  numb  the area supplied by that set of nerves. It is a type of analgesia used to control pain and decreases the need for narcotics following surgery.    Types of Regional Blocks:  Femoral: A block injected into the groin area of the operative leg of a patient having thigh or knee surgery.  Adductor Canal: A block injected into the mid thigh of the operative leg of a patient having knee or ankle surgery.  Popliteal or Distal Sciatic: A block injected into the back of the knee of the operative leg of patients having foot or ankle surgery.   Ankle:  An anesthetic medication is injected into the ankle of the operative leg of a patient having foot or toe surgery.     Procedure:   The type of anesthesia your doctor used to numb your leg will usually not wear off for 24-48 hours, but may last as long as 72 hours. You should be careful during that period, since it is possible to injure your leg without being aware of the injury. While your leg is numb you should:    Use crutches (minimal weight bearing until your motor and strength is completely back to normal)    Avoid striking or bumping your leg    Avoid extreme hot or cold    Discomfort:  You will have a tingling and prickly sensation in your leg as the feeling begins to return; you can also expect some discomfort. The amount of discomfort is unpredictable, but if you have more pain than can be controlled with pain medication, you should notify your physician.     Pain  Medicine:   Begin taking your oral pain pills before bedtime and during the night to avoid a sudden onset of pain as part of the block wears off. Do not engage in drinking, driving, or hazardous occupations while taking pain medication.

## 2020-06-12 NOTE — ANESTHESIA POSTPROCEDURE EVALUATION
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     Anesthesia Type: No value filed.          Postop Pain Control: Uneventful            Sign Out: Well controlled pain   PONV: No   Neuro/Psych: Uneventful            Sign Out: Acceptable/Baseline neuro status   Airway/Respiratory: Uneventful            Sign Out: Acceptable/Baseline resp. status   CV/Hemodynamics: Uneventful            Sign Out: Acceptable CV status   Other NRE: NONE   DID A NON-ROUTINE EVENT OCCUR? No         Last Anesthesia Record Vitals:  CRNA VITALS  2020 0925 - 2020 1025      2020             Pulse:  83    SpO2:  98 %          Last PACU Vitals:  Vitals Value Taken Time   /86 2020 10:15 AM   Temp 36.4  C (97.5  F) 2020 10:15 AM   Pulse 80 2020 10:15 AM   Resp 16 2020 10:15 AM   SpO2 92 % 2020 10:15 AM   Temp src     NIBP     Pulse     SpO2     Resp     Temp     Ht Rate     Temp 2           Electronically Signed By: Lopez Toth MD, 2020, 2:36 PM

## 2020-06-12 NOTE — OP NOTE
"Date of procedure: 9/9/16    PREOPERATIVE DIAGNOSES:    1. 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    2. Medial meniscus tear        POSTOPERATIVE DIAGNOSES:      1. left knee anterior cruciate ligament tear.    2. Medial meniscus tear left knee        PROCEDURES:    1.  Revision Anterior cruciate ligament reconstruction, with allograft.    2. Partial medial meniscectomy      SURGEON:  Donell Brower MD        FIRST ASSISTANT:  MARJORIE Sparks        ANESTHESIA:  General plus regional block.        INDICATIONS:  Mayur Sheth is a 65 year old  male  who had BTB PATELLAR TENDON AUTOGRAFT ACL reconstruction over 10 years ago.  He reports that the knee \"popped-out\" getting out of a chair,and since then has been very unstable.  He hadn't had issues with knee instability prior to this episode.  On examination there was grade 3 Lachman's, and MRI revealed an ACL graft tear or compromise and a possible medial meniscus root tear.    Informed consent was obtained for the procedure.        DETAILS OF PROCEDURE:  In the preop area, anesthesia a femoral nerve block was administered.  The patient was then taken to the operating room where general anesthesia was induced. Tourniquet placed around the proximal leg.  Examination under anesthesia revealed a grade 2+  Lachman's, grade, an  equivocal pivot shift.  Negative posterior drawer and collaterals were stable.  A lateral post was used.  Limb was prepped and draped in the usual sterile fashion.  Pause for site confirmation performed.        The limb was exsanguinated, limb was prepped and draped in the usual sterile fashion.  Pause for site confirmation performed.  Then, local anesthetic was injected into the anticipated portal sites, incision sites, and intra-articularly.    The limb was exsanguinated, tourniquet inflated to 300 mmHg.  .        The inflow portal was established and inflow started.  The inferolateral scope portal was established scope " introduced.  Medial working portal localized using a spinal needle, the portal made and the probe introduced.  The following findings were noted at arthroscopy:   In the patellofemoral compartment, there was grade 1 articular cartilage changes on the patella, with distal trochlear grade 3 changes, and normal tracking.  In the medial compartment, there was grade 3 changes on the medial femoral condyle only in the posterior aspect, and the loose articular cartilage was debrided, as it was on the trochlea.  There was a medial meniscus root partial thickness tear, but the root was not destabilized.   Then in the lateral compartment, there were grade 1 changes  with a normal lateral meniscus.  Then, the notch was inspected and there was loose ACL graft fibers present, providing minimal if any stabilty.  The PCL was noted to be intact.      Using a combination of arthroscopic rongeurs and the motorized shaver, the unstable portions of the posterior root medial meniscus were removed, and the remaining meniscal tissue was tapered. Minimal meniscal tissue was taken. Minor chondroplasty was performed in the medial and patellofemoral compartment(s).     The ACL remnant was debrided and the notch area was debrided as well.        A tibialis anterior allograft was then opened, thawed and then rinsed it with antibiotic saline solution.  The the ACL tightrope apparatus was attached to folded midpoint of the graft, and the free ends were sutured with locking stitches.  We then pretensioned the graft with 20 pounds of tension x 30 minutes.    While the graft was being prepared on the back table I made a small incision over the medial face of the proximal tibia right guessed that the old tibial interference screw was located.  The incision was taken down through the skin and subcutaneous tissue to the level of the bone.  I elevated subperiosteally over this area and use an osteotome to break the cortex.  When I could not find the  interference screw immediately, I used the C arm to localize the screw and we were actually very close.  I breached the cortex with an osteotome in this area and found the screw easily.  The screw was then removed.    The arthroscope was re-introduced into the knee.  Then we used the 10 mm FlipCutter to drill an inside out socket in the femur, and noted the innerosseous distance.  We then passed a passing suture and docked it outside the lateral portal.   Looking through the lateral portal, we drilled a tibial tunnel at 60 degrees, with a 9 mm Acufex drill.  I then used dilators up to size 10 and found that the dilators were fitting snugly which was reassuring for the new tibial tunnel.  With the distal entry point roughly where the screw was. The bone debris was shaved out of the knee.      We then passed the prepared tibialis anterior allograft up through the tibial tunnel and docked the button outside the lateral femoral cortex.  We then used the TightRope tensioning sutures to dock the graft into the femoral tunnel.   Then the fiber tape that was incorporated in the femoral tight rope was secured with a swivel lock with the knee in full extension.  Then with equal tension on each limb of the allograft, and with the knee in full extension and a posterior drawer placed on the knee, the 10 x 27 mm bio composite interference screw was placed with excellent purchase and fixation.  I then tightened the femoral side of the graft a little bit more while I visualized the graft with the scope.  Then I observes that the internal brace that we had placed was a little bit lax as I wanted. At this point, the Lachman's had been eliminated.  There was no pivoting of the tibia internally on the femur.  Full range of motion was maintained.  Then, the graft sutures were secured in a second swivel lock for added fixation.  The larger incisions, were closed with interrupted 2-0 Vicryl sutures, followed by running 3-0 Monocryl suture  in the subcuticular layer and Steri-Strips.  Portal sites were closed with interrupted 3-0 nylon sutures.   Sterile dressings were applied followed by a  knee immobilizer.  The patient was awakened, transferred to the hospital cart and to the recovery area in stable condition.        PLAN: 25 % weightbearing for 4 weeks with a 90 degree flexion limit for 4 weeks as well.  Normal ACL rehabilitation protocol.            PATRICIA MENA MD

## 2020-06-12 NOTE — OR NURSING
Pt s/p Left knee ACL reconstruction. Discharge instrutions given to wife Marisela, via phone. Also advised pt and wife to  Aspirin 325mg for daily dose per discharge instructions along with scripts sent to local pharm.  Pt sent home with Ice gel packs, crutches- with teaching and also reinforced with pt education printouts for walking, fitting,stairs,  maneuvering with crutches. Pt able be to 50% weight bearing per discharge orders. Has knee immobilizer on Left knee.

## 2020-06-12 NOTE — ANESTHESIA PROCEDURE NOTES
Peripheral Nerve Block Procedure Note  Staff -   Anesthesiologist:  Lopez Toth MD      Performed By: anesthesiologist        Location: Pre-op  Procedure Start/Stop TImes:      6/12/2020 6:45 AM     6/12/2020 6:55 AM    patient identified, IV checked, site marked, risks and benefits discussed, informed consent, monitors and equipment checked, pre-op evaluation, at physician/surgeon's request and post-op pain management      Correct Patient: Yes      Correct Position: Yes      Correct Site: Yes      Correct Procedure: Yes      Correct Laterality:  Yes    Site Marked:  Yes  Procedure details:     Procedure:  Adductor canal    Laterality:  Left    Position:  Supine    Sterile Prep: chloraprep, mask and sterile gloves      Local skin infiltration:  None    Needle:  Short bevel    Needle gauge:  21    Needle length (inches):  4    Ultrasound: Yes      Ultrasound used to identify targeted nerve, plexus, or vascular structure and placed a needle adjacent to it      Permanent Image entered into patiient's record      Abnormal pain on injection: No      Blood Aspirated: No      Paresthesias:  No    Bleeding at site: No      Test dose negative for signs of intravascular injection: Yes      Bolus via:  Needle    Infusion Method:  Single Shot    Complications:  None  Assessment/Narrative:     Injection made incrementally with aspirations every (mL):  5

## 2020-06-12 NOTE — ANESTHESIA CARE TRANSFER NOTE
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    Anesthesia Type:   No value filed.     Note:  Anesthesia Care Transfer Notewriter    Vitals: (Last set prior to Anesthesia Care Transfer)    CRNA VITALS  6/12/2020 0925 - 6/12/2020 1000      6/12/2020             Pulse:  83    SpO2:  98 %          Awake, comfortable, sats 99%, Report to RN.      Electronically Signed By: MARIJA Beatty CRNA  June 12, 2020  10:00 AM

## 2020-06-12 NOTE — ANESTHESIA PREPROCEDURE EVALUATION
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 [M23.8X2]  Other tear of medial meniscus of left knee, unspecified whether old or current tear, subsequent encounter [S83.242D]   Procedure(s):  Left Revision ACL RECONSTRUCTION with allograft, and partial medial meniscectomy     LABS:  CBC: No results found for: WBC, HGB, HCT, PLT  BMP:   Lab Results   Component Value Date     10/21/2019     2018    POTASSIUM 3.8 10/21/2019    POTASSIUM 3.9 2018    CHLORIDE 104 10/21/2019    CHLORIDE 102 2018    CO2 26 10/21/2019    CO2 31 2018    BUN 10 10/21/2019    BUN 12 2018    CR 0.89 10/21/2019    CR 0.93 2018     (H) 10/21/2019     (H) 2018     COAGS: No results found for: PTT, INR, FIBR  POC: No results found for: BGM, HCG, HCGS  OTHER:   Lab Results   Component Value Date    HERMINIA 8.8 10/21/2019    ALBUMIN 3.7 10/21/2019    PROTTOTAL 6.8 10/21/2019    ALT 44 10/21/2019    AST 21 10/21/2019    ALKPHOS 89 10/21/2019    BILITOTAL 0.5 10/21/2019        Preop Vitals    BP Readings from Last 3 Encounters:   20 129/77   20 139/84   20 (!) 160/84    Pulse Readings from Last 3 Encounters:   20 80   20 79   20 96      Resp Readings from Last 3 Encounters:   20 20   20 18   20 24    SpO2 Readings from Last 3 Encounters:   20 94%   20 95%   20 92%      Temp Readings from Last 1 Encounters:   20 36.7  C (98  F) (Temporal)    Ht Readings from Last 1 Encounters:   20 1.657 m (5' 5.25\")      Wt Readings from Last 1 Encounters:   20 97.5 kg (215 lb)    Estimated body mass index is 35.5 kg/m  as calculated from the following:    Height as of 20: 1.657 m (5' 5.25\").    Weight as of 20: 97.5 kg (215 lb). "     LDA:        Past Medical History:   Diagnosis Date     AC (acromioclavicular) joint arthritis 12/1/2016     Bee sting allergies      Complete tear of left rotator cuff 12/1/2016     Diverticulosis      Essential hypertension, benign 2006     GERD (gastroesophageal reflux disease)      Hyperlipidemia LDL goal <100     Fam Hx CAD     Medial meniscus tear     L knee     Obesity, Class II, BMI 35-39.9, with comorbidity 2/14/2013     Rotator cuff arthropathy, left      S/P rotator cuff repair 12/14/16    left     Tubular adenoma 3/24/08      Past Surgical History:   Procedure Laterality Date     ARTHROSCOPY SHLDR ROTATOR CUFF REPAIR, SUBACROMIAL DECOMP, DIST CLAVICLE RESECTION, BICEP TENODESIS Left 12/14/2016    Procedure: ARTHROSCOPY SHOULDER ROTATOR CUFF REPAIR, SUBACROMIAL DECOMPRESSION, DISTAL CLAVICLE RESECTION, OPEN BICEP TENODESIS REPAIR;  Surgeon: Donell Brower MD;  Location: [facility] OR     C REPAIR CRUCIATE LIGAMENT,KNEE  ~1992    LT ACL     TONSILLECTOMY & ADENOIDECTOMY        Allergies   Allergen Reactions     No Clinical Screening - See Comments Anaphylaxis     Detergents,        Bee      Morphine         Anesthesia Evaluation     .             ROS/MED HX    ENT/Pulmonary:  - neg pulmonary ROS   (+)tobacco use, Current use , . .    Neurologic:  - neg neurologic ROS     Cardiovascular:  - neg cardiovascular ROS   (+) Dyslipidemia, hypertension----. : . . . :. .       METS/Exercise Tolerance:     Hematologic:  - neg hematologic  ROS       Musculoskeletal:  - neg musculoskeletal ROS       GI/Hepatic:  - neg GI/hepatic ROS   (+) GERD       Renal/Genitourinary:  - ROS Renal section negative       Endo:  - neg endo ROS   (+) Obesity, .      Psychiatric:  - neg psychiatric ROS       Infectious Disease:  - neg infectious disease ROS       Malignancy:      - no malignancy   Other:    - neg other ROS                     PHYSICAL EXAM:   Mental Status/Neuro: A/A/O   Airway: Facies: Feasible  Mallampati:  I  Mouth/Opening: Full  TM distance: > 6 cm  Neck ROM: Full   Respiratory: Auscultation: CTAB     Resp. Rate: Normal     Resp. Effort: Normal      CV: Rhythm: Regular  Rate: Age appropriate  Heart: Normal Sounds  Edema: None   Comments:      Dental: Normal Dentition                Assessment:   ASA SCORE: 3    H&P: History and physical reviewed and following examination; no interval change.   Smoking Status:  Non-Smoker/Unknown   NPO Status: NPO Appropriate     Plan:   Anes. Type:  General; Peripheral Nerve Block; For Post-op pain in coordination with surgeon     Block Details: Exparel; Single Shot; Adductor C.   Pre-Medication: None   Induction:  IV (Standard)   Airway: LMA   Access/Monitoring: PIV   Maintenance: Balanced     Postop Plan:   Postop Pain: Opioids; Regional  Postop Sedation/Airway: Not planned  Disposition: Outpatient     PONV Management:   Adult Risk Factors:, Non-Smoker, Postop Opioids   Prevention: Ondansetron, Dexamethasone, Propofol     CONSENT: Direct conversation   Plan and risks discussed with: Patient   Blood Products: Consent Deferred (Minimal Blood Loss)                   Lopez Toth MD

## 2020-06-16 NOTE — PROGRESS NOTES
Mayur Sheth is here for follow-up, status post  left ACL reconstruction revision with allograft and partial medial meniscectomy.  10 days ago.    Findings: The following findings were noted at arthroscopy:   In the patellofemoral compartment, there was grade 1 articular cartilage changes on the patella, with distal trochlear grade 3 changes, and normal tracking.  In the medial compartment, there was grade 3 changes on the medial femoral condyle only in the posterior aspect, and the loose articular cartilage was debrided, as it was on the trochlea.  There was a medial meniscus root partial thickness tear, but the root was not destabilized.   Then in the lateral compartment, there were grade 1 changes  with a normal lateral meniscus.  Then, the notch was inspected and there was loose ACL graft fibers present, providing minimal if any stabilty.  The PCL was noted to be intact      Symptoms: mild pain  Function: partial weight bearing      EXAM:  Swelling: moderate   Wounds: look good, no evidence of infection   Calf: nontender   CMSI   ROM: 0-90  Able to do SLR:    IMPRESSION:  Encounter Diagnosis   Name Primary?     S/P ACL reconstruction revision Yes    doing well.  Allograft  Partial medial meniscectomy     PLAN:  Continue ACL postoperative program.   Physical therapy: to start soon    Will continue range of motion and strengthening.  Home exercise program. Scar massage.  Weight bearing 50% x 2 more weeks.  Return to clinic 2 week(s) with xrays.  Pain meds: none needed  Brace wear: discontinue knee immobilizer    discontinue knee immobilizer   Work: btw home job anytime,.    MARLENY Brower MD  Dept. Orthopedic Surgery  [facility]

## 2020-06-24 ENCOUNTER — OFFICE VISIT (OUTPATIENT)
Dept: ORTHOPEDICS | Facility: CLINIC | Age: 65
End: 2020-06-24
Payer: OTHER GOVERNMENT

## 2020-06-24 VITALS
SYSTOLIC BLOOD PRESSURE: 155 MMHG | DIASTOLIC BLOOD PRESSURE: 90 MMHG | HEIGHT: 65 IN | WEIGHT: 219 LBS | BODY MASS INDEX: 36.49 KG/M2 | OXYGEN SATURATION: 96 % | HEART RATE: 115 BPM

## 2020-06-24 DIAGNOSIS — Z98.890 S/P ACL RECONSTRUCTION: Primary | ICD-10-CM

## 2020-06-24 PROCEDURE — 99024 POSTOP FOLLOW-UP VISIT: CPT | Performed by: ORTHOPAEDIC SURGERY

## 2020-06-24 ASSESSMENT — MIFFLIN-ST. JEOR: SCORE: 1719.22

## 2020-06-24 NOTE — LETTER
6/24/2020         RE: Mayur Sheth  [address] [place]  [place] [address]        Dear Colleague,    Thank you for referring your patient, Mayur Sheth, to the [facility]. Please see a copy of my visit note below.    Mayur Sheth is here for follow-up, status post  left ACL reconstruction revision with allograft and partial medial meniscectomy.  10 days ago.    Findings: The following findings were noted at arthroscopy:   In the patellofemoral compartment, there was grade 1 articular cartilage changes on the patella, with distal trochlear grade 3 changes, and normal tracking.  In the medial compartment, there was grade 3 changes on the medial femoral condyle only in the posterior aspect, and the loose articular cartilage was debrided, as it was on the trochlea.  There was a medial meniscus root partial thickness tear, but the root was not destabilized.   Then in the lateral compartment, there were grade 1 changes  with a normal lateral meniscus.  Then, the notch was inspected and there was loose ACL graft fibers present, providing minimal if any stabilty.  The PCL was noted to be intact      Symptoms: mild pain  Function: partial weight bearing      EXAM:  Swelling: moderate   Wounds: look good, no evidence of infection   Calf: nontender   CMSI   ROM: 0-90  Able to do SLR:    IMPRESSION:  Encounter Diagnosis   Name Primary?     S/P ACL reconstruction revision Yes    doing well.  Allograft  Partial medial meniscectomy     PLAN:  Continue ACL postoperative program.   Physical therapy: to start soon    Will continue range of motion and strengthening.  Home exercise program. Scar massage.  Weight bearing 50% x 2 more weeks.  Return to clinic 2 week(s) with xrays.  Pain meds: none needed  Brace wear: discontinue knee immobilizer    discontinue knee immobilizer   Work: btw home job anytime,.    MARLENY Brower MD  Dept. Orthopedic Surgery  [facility]        Again,  thank you for allowing me to participate in the care of your patient.        Sincerely,        Donell Brower MD

## 2020-07-06 NOTE — PROGRESS NOTES
SUBJECTIVE:  Mayur Sheth is here for postoperative follow up status post left knee ACL revision/ reconstruction on 6/12/20. It has been approximately 3.5 weeks since the procedure. Other procedures: Partial medial menisectomy.   He reports minimal pain.    Going to physical therapy?: doing home exercise program     Surgical findings:      In the patellofemoral compartment, there was grade 1 articular cartilage changes on the patella, with distal trochlear grade 3 changes, and normal tracking.  In the medial compartment, there was grade 3 changes on the medial femoral condyle only in the posterior aspect, and the loose articular cartilage was debrided, as it was on the trochlea.  There was a medial meniscus root partial thickness tear, but the root was not destabilized.   Then in the lateral compartment, there were grade 1 changes  with a normal lateral meniscus.  Then, the notch was inspected and there was loose ACL graft fibers present, providing minimal if any stabilty.  The PCL was noted to be intact.     Review of Systems:  Constitutional/General: Negative for fever, chills, change in weight  Integumentary/Skin: Negative for worrisome rashes, moles, or lesions  Neuro: Negative for weakness, dizziness, or paresthesias   Psychiatric: negative for changes in mood or affect    OBJECTIVE:  Physical Exam:  There were no vitals taken for this visit.  General Appearance: healthy, alert and no distress   Skin: no suspicious lesions or rashes  Neuro: Normal strength and tone, mentation intact and speech normal  Vascular: good pulses, and cappillary refill   Lymph: no lymphadenopathy   Psych:  mentation appears normal and affect normal/bright  Resp: no increased work of breathing    Left Knee Exam:  Inspection: Wounds healed, no evidence of infection.  Effusion: mild   Some swelling around the tibial incision.   ROM: 0-110   SLR: able  Lachman's: stable    ASSESSMENT:   Doing well. status post left knee ACL  reconstruction revision    PLAN:      Continue ACL program.   Physical therapy: starts this week     Will continue range of motion and strengthening.  Home exercise program. Scar massage.  Return to clinic 4 week(s).  Pain meds: none   Brace wear: he has a short hinged knee brace-- wear with ambulation.  Weightbearing: weight bearing as tolerated   Work: from home    Return to clinic: 4 weeks with xrays then.    MARLENY Brower MD  Dept. Orthopedic Surgery  [facility]

## 2020-07-08 ENCOUNTER — OFFICE VISIT (OUTPATIENT)
Dept: ORTHOPEDICS | Facility: CLINIC | Age: 65
End: 2020-07-08
Payer: OTHER GOVERNMENT

## 2020-07-08 VITALS — HEART RATE: 84 BPM | OXYGEN SATURATION: 97 % | SYSTOLIC BLOOD PRESSURE: 138 MMHG | DIASTOLIC BLOOD PRESSURE: 82 MMHG

## 2020-07-08 DIAGNOSIS — Z98.890 STATUS POST RECONSTRUCTION OF ANTERIOR CRUCIATE LIGAMENT: Primary | ICD-10-CM

## 2020-07-08 PROCEDURE — 99024 POSTOP FOLLOW-UP VISIT: CPT | Performed by: ORTHOPAEDIC SURGERY

## 2020-07-08 ASSESSMENT — PAIN SCALES - GENERAL: PAINLEVEL: MODERATE PAIN (4)

## 2020-07-08 NOTE — LETTER
7/8/2020         RE: Mayur Sheth  [address]  [place] [facility] [address]        Dear Colleague,    Thank you for referring your patient, Mayur Sheth, to the [facility]. Please see a copy of my visit note below.    SUBJECTIVE:  Mayur Sheth is here for postoperative follow up status post left knee ACL revision/ reconstruction on 6/12/20. It has been approximately 3.5 weeks since the procedure. Other procedures: Partial medial menisectomy.   He reports minimal pain.    Going to physical therapy?: doing home exercise program     Surgical findings:      In the patellofemoral compartment, there was grade 1 articular cartilage changes on the patella, with distal trochlear grade 3 changes, and normal tracking.  In the medial compartment, there was grade 3 changes on the medial femoral condyle only in the posterior aspect, and the loose articular cartilage was debrided, as it was on the trochlea.  There was a medial meniscus root partial thickness tear, but the root was not destabilized.   Then in the lateral compartment, there were grade 1 changes  with a normal lateral meniscus.  Then, the notch was inspected and there was loose ACL graft fibers present, providing minimal if any stabilty.  The PCL was noted to be intact.     Review of Systems:  Constitutional/General: Negative for fever, chills, change in weight  Integumentary/Skin: Negative for worrisome rashes, moles, or lesions  Neuro: Negative for weakness, dizziness, or paresthesias   Psychiatric: negative for changes in mood or affect    OBJECTIVE:  Physical Exam:  There were no vitals taken for this visit.  General Appearance: healthy, alert and no distress   Skin: no suspicious lesions or rashes  Neuro: Normal strength and tone, mentation intact and speech normal  Vascular: good pulses, and cappillary refill   Lymph: no lymphadenopathy   Psych:  mentation appears normal and affect normal/bright  Resp: no increased work  of breathing    Left Knee Exam:  Inspection: Wounds healed, no evidence of infection.  Effusion: mild   Some swelling around the tibial incision.   ROM: 0-110   SLR: able  Lachman's: stable    ASSESSMENT:   Doing well. status post left knee ACL reconstruction revision    PLAN:      Continue ACL program.   Physical therapy: starts this week     Will continue range of motion and strengthening.  Home exercise program. Scar massage.  Return to clinic 4 week(s).  Pain meds: none   Brace wear: he has a short hinged knee brace-- wear with ambulation.  Weightbearing: weight bearing as tolerated   Work: from home    Return to clinic: 4 weeks with xrays then.    MARLENY Brower MD  Dept. Orthopedic Surgery  [facility]            Again, thank you for allowing me to participate in the care of your patient.        Sincerely,        Donell Brower MD

## 2020-07-08 NOTE — NURSING NOTE
Ready to quit: Not Answered  Counseling given: Not Answered  Comment: requit, uses Chantix successfully  Davion BLACKBURN

## 2020-07-10 ENCOUNTER — THERAPY VISIT (OUTPATIENT)
Dept: PHYSICAL THERAPY | Facility: CLINIC | Age: 65
End: 2020-07-10
Attending: ORTHOPAEDIC SURGERY
Payer: OTHER GOVERNMENT

## 2020-07-10 DIAGNOSIS — G89.29 CHRONIC PAIN OF LEFT KNEE: ICD-10-CM

## 2020-07-10 DIAGNOSIS — Z98.890 S/P ACL RECONSTRUCTION: ICD-10-CM

## 2020-07-10 DIAGNOSIS — M25.562 CHRONIC PAIN OF LEFT KNEE: ICD-10-CM

## 2020-07-10 DIAGNOSIS — Z47.89 AFTERCARE FOLLOWING SURGERY OF THE MUSCULOSKELETAL SYSTEM: ICD-10-CM

## 2020-07-10 PROCEDURE — 97110 THERAPEUTIC EXERCISES: CPT | Mod: GP | Performed by: PHYSICAL THERAPIST

## 2020-07-10 PROCEDURE — 97161 PT EVAL LOW COMPLEX 20 MIN: CPT | Mod: GP | Performed by: PHYSICAL THERAPIST

## 2020-07-10 ASSESSMENT — ACTIVITIES OF DAILY LIVING (ADL)
KNEEL ON THE FRONT OF YOUR KNEE: I AM UNABLE TO DO THE ACTIVITY
STAND: ACTIVITY IS SOMEWHAT DIFFICULT
AS_A_RESULT_OF_YOUR_KNEE_INJURY,_HOW_WOULD_YOU_RATE_YOUR_CURRENT_LEVEL_OF_DAILY_ACTIVITY?: ABNORMAL
KNEE_ACTIVITY_OF_DAILY_LIVING_SUM: 23
WALK: ACTIVITY IS FAIRLY DIFFICULT
STIFFNESS: THE SYMPTOM AFFECTS MY ACTIVITY MODERATELY
SIT WITH YOUR KNEE BENT: ACTIVITY IS FAIRLY DIFFICULT
RAW_SCORE: 23
SWELLING: THE SYMPTOM AFFECTS MY ACTIVITY MODERATELY
RISE FROM A CHAIR: ACTIVITY IS FAIRLY DIFFICULT
GIVING WAY, BUCKLING OR SHIFTING OF KNEE: I HAVE THE SYMPTOM BUT IT DOES NOT AFFECT MY ACTIVITY
PAIN: THE SYMPTOM AFFECTS MY ACTIVITY MODERATELY
SQUAT: I AM UNABLE TO DO THE ACTIVITY
WEAKNESS: THE SYMPTOM AFFECTS MY ACTIVITY SEVERELY
HOW_WOULD_YOU_RATE_THE_OVERALL_FUNCTION_OF_YOUR_KNEE_DURING_YOUR_USUAL_DAILY_ACTIVITIES?: ABNORMAL
KNEE_ACTIVITY_OF_DAILY_LIVING_SCORE: 32.86
GO UP STAIRS: ACTIVITY IS VERY DIFFICULT
LIMPING: THE SYMPTOM AFFECTS MY ACTIVITY SEVERELY
GO DOWN STAIRS: ACTIVITY IS VERY DIFFICULT
HOW_WOULD_YOU_RATE_THE_CURRENT_FUNCTION_OF_YOUR_KNEE_DURING_YOUR_USUAL_DAILY_ACTIVITIES_ON_A_SCALE_FROM_0_TO_100_WITH_100_BEING_YOUR_LEVEL_OF_KNEE_FUNCTION_PRIOR_TO_YOUR_INJURY_AND_0_BEING_THE_INABILITY_TO_PERFORM_ANY_OF_YOUR_USUAL_DAILY_ACTIVITIES?: 63

## 2020-07-10 NOTE — PROGRESS NOTES
"[facility] Initial Evaluation  Subjective:  The history is provided by the patient. No  was used.   Patient Health History  Mayur Sheth being seen for acl replacement rehab.     Problem began: 6/12/2020.   Problem occurred: surgery   Pain is reported as 5/10 on pain scale.  General health as reported by patient is good.  Pertinent medical history includes: high blood pressure, overweight, osteoarthritis and smoking.   Red flags:  None as reported by patient.  Medical allergies: adhesives.   Surgeries include:  Orthopedic surgery.    Current medications:  Anti-inflammatory and high blood pressure medication.    Current occupation is Senior .   Primary job tasks include:  Computer work, driving, lifting/carrying, prolonged sitting and pushing/pulling.                  Therapist Generated HPI Evaluation  Problem details: Patient presents to PT today with c/o L knee pain s/p L ACL revision (6/12/2020).  Previous ACL reconstruction over 20 years..         Type of problem:  Left knee.    This is a recurrent condition.  Condition occurred with:  Other reason (stood up at home in shower May 2020; heard a \"pop\").    Patient reports pain:  In the joint and sub patellar.    Pain radiates to:  Lower leg (tightness ).     Associated symptoms:  Loss of motion/stiffness, loss of strength and edema. Symptoms are exacerbated by ascending stairs, descending stairs, transfers, standing, walking, sitting, bending/squatting, activity and other (laying with leg in brace )  and relieved by ice, rest and activity/movement.                              Objective:    Gait:  1 crutch outside the home and up/down the stairs-  Pt stated he usually walks with nothing in the house  Gait Type:  Antalgic   Weight Bearing Status:  WBAT   Assistive Devices:  Crutches                                                        Knee Evaluation:  ROM:    AROM      Extension: Left: 6    " Right:   Flexion: Left: 118   Right:        Strength:     Extension:  Left: 4-/5   Pain:        Flexion:  Left: 4-/5   Pain:        Quad Set Left: Fair    Pain:         Palpation:    Left knee tenderness present at:  Medial Joint Line; Lateral Joint Line; Patellar Tendon; Patellar Medial; Patellar Lateral; Patellar Superior and Patellar Inferior    Edema:  Edema of the knee: slight swelling noted.            General     ROS    Assessment/Plan:    Patient is a 65 year old male with left side knee complaints.    Patient has the following significant findings with corresponding treatment plan.                Diagnosis 1:  S/p L ACL (revision from 20 years ago)  Pain -  hot/cold therapy and manual therapy  Decreased ROM/flexibility - manual therapy, therapeutic exercise and therapeutic activity  Decreased strength - therapeutic exercise, therapeutic activities and home program  Impaired gait - gait training and assistive devices  Impaired muscle performance - neuro re-education  Decreased function - therapeutic activities    Therapy Evaluation Codes:   1) History comprised of:   Personal factors that impact the plan of care:      Age and Past/current experiences.    Comorbidity factors that impact the plan of care are:      High blood pressure, Osteoarthritis, Overweight and Smoking.     Medications impacting care: Anti-inflammatory and High blood pressure.  2) Examination of Body Systems comprised of:   Body structures and functions that impact the plan of care:      Knee.   Activity limitations that impact the plan of care are:      Bending, Lifting, Sitting, Squatting/kneeling, Stairs, Standing, Walking and transfers.  3) Clinical presentation characteristics are:   Stable/Uncomplicated.  4) Decision-Making    Low complexity using standardized patient assessment instrument and/or measureable assessment of functional outcome.  Cumulative Therapy Evaluation is: Low complexity.    Previous and current functional  limitations:  (See Goal Flow Sheet for this information)    Short term and Long term goals: (See Goal Flow Sheet for this information)     Communication ability:  Patient appears to be able to clearly communicate and understand verbal and written communication and follow directions correctly.  Treatment Explanation - The following has been discussed with the patient:   RX ordered/plan of care  Anticipated outcomes  Possible risks and side effects  This patient would benefit from PT intervention to resume normal activities.   Rehab potential is good.    Frequency:  1-2 X week, once daily  Duration:  for 4-6 weeks  Discharge Plan:  Achieve all LTG.  Independent in home treatment program.  Reach maximal therapeutic benefit.    Please refer to the daily flowsheet for treatment today, total treatment time and time spent performing 1:1 timed codes.

## 2020-07-10 NOTE — LETTER
"[facility]  [address]  [address]  [place] [address]  [phone]    2020    Re: Mayur Sheth   :   1955  MRN:  [id number]   REFERRING PHYSICIAN:   MD [facility]ARR[facility] PT  Date of Initial Evaluation:  7/10/2020  Visits:  Rxs Used: 1  Reason for Referral:     S/P ACL reconstruction  Chronic pain of left knee  Aftercare following surgery of the musculoskeletal system    EVALUATION SUMMARY    [facility] Initial Evaluation  Subjective:  The history is provided by the patient. No  was used.   Patient Health History  Mayur Sheth being seen for acl replacement rehab.   Problem began: 2020.   Problem occurred: surgery   Pain is reported as 5/10 on pain scale.  General health as reported by patient is good.  Pertinent medical history includes: high blood pressure, overweight, osteoarthritis and smoking.   Red flags:  None as reported by patient.  Medical allergies: adhesives.   Surgeries include:  Orthopedic surgery.    Current medications:  Anti-inflammatory and high blood pressure medication.    Current occupation is Senior .   Primary job tasks include:  Computer work, driving, lifting/carrying, prolonged sitting and pushing/pulling.                Therapist Generated HPI Evaluation  Problem details: Patient presents to PT today with c/o L knee pain s/p L ACL revision (2020).  Previous ACL reconstruction over 20 years..         Type of problem:  Left knee.    This is a recurrent condition.  Condition occurred with:  Other reason (stood up at home in shower May 2020; heard a \"pop\").    Patient reports pain:  In the joint and sub patellar.  Pain radiates to:  Lower leg (tightness ).       Re: Mayur Sheth   :   1955    Associated symptoms:  Loss of motion/stiffness, loss of strength and edema. Symptoms are exacerbated by ascending stairs, descending stairs, transfers, " standing, walking, sitting, bending/squatting, activity and other (laying with leg in brace )  and relieved by ice, rest and activity/movement.              Objective:  Gait:  1 crutch outside the home and up/down the stairs-  Pt stated he usually walks with nothing in the house  Gait Type:  Antalgic   Weight Bearing Status:  WBAT   Assistive Devices:  Crutches          Knee Evaluation:  ROM:    AROM  Extension: Left: 6    Right:   Flexion: Left: 118   Right:    Strength:   Extension:  Left: 4-/5   Pain:        Flexion:  Left: 4-/5   Pain:        Quad Set Left: Fair    Pain:     Palpation:    Left knee tenderness present at:  Medial Joint Line; Lateral Joint Line; Patellar Tendon; Patellar Medial; Patellar Lateral; Patellar Superior and Patellar Inferior    Edema:  Edema of the knee: slight swelling noted.    Assessment/Plan:    Patient is a 65 year old male with left side knee complaints.    Patient has the following significant findings with corresponding treatment plan.                Diagnosis 1:  S/p L ACL (revision from 20 years ago)  Pain -  hot/cold therapy and manual therapy  Decreased ROM/flexibility - manual therapy, therapeutic exercise and therapeutic activity  Decreased strength - therapeutic exercise, therapeutic activities and home program  Impaired gait - gait training and assistive devices  Impaired muscle performance - neuro re-education  Decreased function - therapeutic activities    Therapy Evaluation Codes:   1) History comprised of:   Personal factors that impact the plan of care:      Age and Past/current experiences.    Comorbidity factors that impact the plan of care are:      High blood pressure, Osteoarthritis, Overweight and Smoking.     Medications impacting care: Anti-inflammatory and High blood pressure.  2) Examination of Body Systems comprised of:   Body structures and functions that impact the plan of care:      Knee.   Activity limitations that impact the plan of care are:       Bending, Lifting, Sitting, Squatting/kneeling, Stairs, Standing, Walking and transfers.  Re: Mayur Sheth   :   1955    3) Clinical presentation characteristics are:   Stable/Uncomplicated.  4) Decision-Making    Low complexity using standardized patient assessment instrument and/or measureable assessment of functional outcome.  Cumulative Therapy Evaluation is: Low complexity.    Previous and current functional limitations:  (See Goal Flow Sheet for this information)    Short term and Long term goals: (See Goal Flow Sheet for this information)     Communication ability:  Patient appears to be able to clearly communicate and understand verbal and written communication and follow directions correctly.  Treatment Explanation - The following has been discussed with the patient:   RX ordered/plan of care  Anticipated outcomes  Possible risks and side effects  This patient would benefit from PT intervention to resume normal activities.   Rehab potential is good.    Frequency:  1-2 X week, once daily  Duration:  for 4-6 weeks  Discharge Plan:  Achieve all LTG.  Independent in home treatment program.  Reach maximal therapeutic benefit.    Please refer to the daily flowsheet for treatment today, total treatment time and time spent performing 1:1 timed codes.       Thank you for your referral.    INQUIRIES  Therapist: Jennifer Lomeli, MPT   [facility]  [address]  [address]  [place] [address]  Phone: [phone]  Fax: [phone]

## 2020-07-14 ENCOUNTER — THERAPY VISIT (OUTPATIENT)
Dept: PHYSICAL THERAPY | Facility: CLINIC | Age: 65
End: 2020-07-14
Payer: OTHER GOVERNMENT

## 2020-07-14 DIAGNOSIS — Z47.89 AFTERCARE FOLLOWING SURGERY OF THE MUSCULOSKELETAL SYSTEM: ICD-10-CM

## 2020-07-14 DIAGNOSIS — M25.562 CHRONIC PAIN OF LEFT KNEE: ICD-10-CM

## 2020-07-14 DIAGNOSIS — G89.29 CHRONIC PAIN OF LEFT KNEE: ICD-10-CM

## 2020-07-14 PROCEDURE — 97110 THERAPEUTIC EXERCISES: CPT | Mod: GP | Performed by: PHYSICAL THERAPIST

## 2020-07-14 PROCEDURE — 97112 NEUROMUSCULAR REEDUCATION: CPT | Mod: GP | Performed by: PHYSICAL THERAPIST

## 2020-07-22 DIAGNOSIS — F17.200 TOBACCO USE DISORDER: Primary | ICD-10-CM

## 2020-07-22 NOTE — TELEPHONE ENCOUNTER
Routing refill request to provider for review/approval because:  Drug not active on patient's medication list  Medication is reported/historical. Per review med was originally ordered 10/21/19 but removed from med list

## 2020-07-23 ENCOUNTER — THERAPY VISIT (OUTPATIENT)
Dept: PHYSICAL THERAPY | Facility: CLINIC | Age: 65
End: 2020-07-23
Payer: OTHER GOVERNMENT

## 2020-07-23 DIAGNOSIS — M25.562 CHRONIC PAIN OF LEFT KNEE: ICD-10-CM

## 2020-07-23 DIAGNOSIS — G89.29 CHRONIC PAIN OF LEFT KNEE: ICD-10-CM

## 2020-07-23 DIAGNOSIS — Z47.89 AFTERCARE FOLLOWING SURGERY OF THE MUSCULOSKELETAL SYSTEM: ICD-10-CM

## 2020-07-23 PROCEDURE — 97112 NEUROMUSCULAR REEDUCATION: CPT | Mod: GP | Performed by: PHYSICAL THERAPIST

## 2020-07-23 PROCEDURE — 97110 THERAPEUTIC EXERCISES: CPT | Mod: GP | Performed by: PHYSICAL THERAPIST

## 2020-07-23 RX ORDER — VARENICLINE TARTRATE 1 MG/1
TABLET, FILM COATED ORAL
Qty: 56 TABLET | Refills: 0 | Status: SHIPPED | OUTPATIENT
Start: 2020-07-23 | End: 2020-09-02

## 2020-07-30 ENCOUNTER — THERAPY VISIT (OUTPATIENT)
Dept: PHYSICAL THERAPY | Facility: CLINIC | Age: 65
End: 2020-07-30
Payer: OTHER GOVERNMENT

## 2020-07-30 DIAGNOSIS — Z47.89 AFTERCARE FOLLOWING SURGERY OF THE MUSCULOSKELETAL SYSTEM: ICD-10-CM

## 2020-07-30 DIAGNOSIS — M25.562 CHRONIC PAIN OF LEFT KNEE: ICD-10-CM

## 2020-07-30 DIAGNOSIS — G89.29 CHRONIC PAIN OF LEFT KNEE: ICD-10-CM

## 2020-07-30 PROCEDURE — 97112 NEUROMUSCULAR REEDUCATION: CPT | Mod: GP | Performed by: PHYSICAL THERAPIST

## 2020-07-30 PROCEDURE — 97110 THERAPEUTIC EXERCISES: CPT | Mod: GP | Performed by: PHYSICAL THERAPIST

## 2020-09-02 DIAGNOSIS — F17.200 TOBACCO USE DISORDER: ICD-10-CM

## 2020-09-02 RX ORDER — VARENICLINE TARTRATE 1 MG/1
TABLET, FILM COATED ORAL
Qty: 56 TABLET | Refills: 0 | Status: SHIPPED | OUTPATIENT
Start: 2020-09-02 | End: 2021-03-01

## 2020-09-02 NOTE — TELEPHONE ENCOUNTER
Prescription approved per [facility] Refill Protocol.    Danielle JOHNSON RN, BSN

## 2020-09-16 NOTE — PROGRESS NOTES
Discharge Note    Progress reporting period is from last progress note on   to Jul 30, 2020.    Mayur failed to follow up and current status is unknown.  Please see information below for last relevant information on current status.  Patient seen for 4 visits.    SUBJECTIVE  Subjective changes noted by patient:  Pt reports a little pain in the knee, but nothing more than normal. Has been trying to get exercises in when able, but is busy during the week, he reports. Feels good on the bike.  .  Current pain level is 3/10.     Previous pain level was  5/10.   Changes in function:  Yes (See Goal flowsheet attached for changes in current functional level)  Adverse reaction to treatment or activity: None    OBJECTIVE  Changes noted in objective findings: Knee ext to 1 deg past neutral, minor ext lag with SLR after 10 reps     ASSESSMENT/PLAN  Diagnosis: s/p L ACL    Updated problem list and treatment plan:   Pain - HEP  Decreased ROM/flexibility - HEP  Decreased strength - HEP  Impaired balance - HEP  STG/LTGs have been met or progress has been made towards goals:  Yes, please see goal flowsheet for most current information  Assessment of Progress: current status is unknown.    Last current status:     Self Management Plans:  HEP  I have re-evaluated this patient and find that the nature, scope, duration and intensity of the therapy is appropriate for the medical condition of the patient.  Mayur continues to require the following intervention to meet STG and LTG's:  HEP.    Recommendations:  Discharge with current home program.  Patient to follow up with MD as needed.    Please refer to the daily flowsheet for treatment today, total treatment time and time spent performing 1:1 timed codes.

## 2020-11-04 DIAGNOSIS — I10 ESSENTIAL HYPERTENSION WITH GOAL BLOOD PRESSURE LESS THAN 140/90: ICD-10-CM

## 2020-11-05 RX ORDER — AMLODIPINE BESYLATE 5 MG/1
5 TABLET ORAL DAILY
Qty: 90 TABLET | Refills: 0 | Status: SHIPPED | OUTPATIENT
Start: 2020-11-05 | End: 2021-02-16

## 2020-11-05 NOTE — TELEPHONE ENCOUNTER
Routing refill request to provider for review/approval because:  Labs not current:  CR    Unsure if pt is still following at [facility] or [facility] now  Marcia Parra RN, BSN

## 2020-11-23 DIAGNOSIS — I10 ESSENTIAL HYPERTENSION WITH GOAL BLOOD PRESSURE LESS THAN 140/90: ICD-10-CM

## 2020-11-23 DIAGNOSIS — Z12.5 SCREENING FOR PROSTATE CANCER: Primary | ICD-10-CM

## 2020-11-23 RX ORDER — TRIAMTERENE AND HYDROCHLOROTHIAZIDE 75; 50 MG/1; MG/1
1 TABLET ORAL DAILY
Qty: 90 TABLET | Refills: 1 | Status: SHIPPED | OUTPATIENT
Start: 2020-11-23 | End: 2021-03-01

## 2020-11-23 NOTE — TELEPHONE ENCOUNTER
Routing refill request to provider for review/approval because:  Labs not current:  All labs  Marcia Parra RN, BSN

## 2020-11-24 NOTE — TELEPHONE ENCOUNTER
Patient stating he will be switching to a different primary due to no longer working in [employer]. Patient lives in [place] and will be looking for a primary in [place].     Patient denied a lab only, virtual follow-up, or physical appointment.     Reyna Hong, EMT at 2:09 PM on November 24, 2020  [facility] Guide   [phone]

## 2021-01-15 ENCOUNTER — HEALTH MAINTENANCE LETTER (OUTPATIENT)
Age: 66
End: 2021-01-15

## 2021-01-16 DIAGNOSIS — E78.5 HYPERLIPIDEMIA LDL GOAL <100: ICD-10-CM

## 2021-01-18 RX ORDER — PRAVASTATIN SODIUM 80 MG/1
80 TABLET ORAL DAILY
Qty: 90 TABLET | Refills: 1 | Status: SHIPPED | OUTPATIENT
Start: 2021-01-18 | End: 2021-03-01

## 2021-02-13 DIAGNOSIS — I10 ESSENTIAL HYPERTENSION WITH GOAL BLOOD PRESSURE LESS THAN 140/90: ICD-10-CM

## 2021-02-15 NOTE — TELEPHONE ENCOUNTER
Routing refill request to provider for review/approval because:  Nubia given x1 and patient did not follow up, please advise  Labs not current:  CR  Patient needs to be seen because it has been more than 1 year since last office visit.    Lacy Mitchell RN on 2/15/2021 at 1:31 PM

## 2021-02-16 RX ORDER — AMLODIPINE BESYLATE 5 MG/1
TABLET ORAL
Qty: 90 TABLET | Refills: 0 | Status: SHIPPED | OUTPATIENT
Start: 2021-02-16 | End: 2021-03-01

## 2021-02-16 NOTE — TELEPHONE ENCOUNTER
Patient returned call and stated he is switching providers soon. Patient has retired and lives in [place] and would like a provider closer to home.     Reyna Hong, EMT at 3:34 PM on February 16, 2021  [facility] Health Guide   [phone]

## 2021-02-16 NOTE — TELEPHONE ENCOUNTER
Refill sent.  Please help patient to schedule with Dr Ojeda this spring.      Leigha Forbes MD  Internal Medicine - Pediatrics

## 2021-02-16 NOTE — TELEPHONE ENCOUNTER
Called patient to schedule, no answer. LVM requesting call back to direct line.     Reyna Hong, EMT at 2:34 PM on February 16, 2021  [facility] Health Guide   [phone]

## 2021-02-26 ASSESSMENT — ENCOUNTER SYMPTOMS
WEAKNESS: 0
JOINT SWELLING: 0
ABDOMINAL PAIN: 0
ARTHRALGIAS: 0
DYSURIA: 0
PALPITATIONS: 0
DIARRHEA: 0
DIZZINESS: 0
COUGH: 0
SORE THROAT: 0
HEMATURIA: 0
MYALGIAS: 0
HEADACHES: 0
NERVOUS/ANXIOUS: 0
SHORTNESS OF BREATH: 0
PARESTHESIAS: 0
HEMATOCHEZIA: 0
NAUSEA: 0
FREQUENCY: 1
HEARTBURN: 0
CHILLS: 0
EYE PAIN: 0
FEVER: 0
CONSTIPATION: 0

## 2021-03-01 ENCOUNTER — OFFICE VISIT (OUTPATIENT)
Dept: FAMILY MEDICINE | Facility: CLINIC | Age: 66
End: 2021-03-01
Payer: OTHER GOVERNMENT

## 2021-03-01 VITALS
TEMPERATURE: 98 F | SYSTOLIC BLOOD PRESSURE: 138 MMHG | HEART RATE: 90 BPM | HEIGHT: 65 IN | OXYGEN SATURATION: 100 % | DIASTOLIC BLOOD PRESSURE: 74 MMHG | BODY MASS INDEX: 34.16 KG/M2 | WEIGHT: 205 LBS

## 2021-03-01 DIAGNOSIS — R59.1 LYMPHADENOPATHY: ICD-10-CM

## 2021-03-01 DIAGNOSIS — Z00.00 ROUTINE GENERAL MEDICAL EXAMINATION AT A HEALTH CARE FACILITY: Primary | ICD-10-CM

## 2021-03-01 DIAGNOSIS — E78.5 HYPERLIPIDEMIA LDL GOAL <100: ICD-10-CM

## 2021-03-01 DIAGNOSIS — Z12.5 SPECIAL SCREENING FOR MALIGNANT NEOPLASM OF PROSTATE: ICD-10-CM

## 2021-03-01 DIAGNOSIS — I10 ESSENTIAL HYPERTENSION WITH GOAL BLOOD PRESSURE LESS THAN 140/90: ICD-10-CM

## 2021-03-01 DIAGNOSIS — Z71.6 ENCOUNTER FOR SMOKING CESSATION COUNSELING: ICD-10-CM

## 2021-03-01 LAB
ANION GAP SERPL CALCULATED.3IONS-SCNC: 4 MMOL/L (ref 3–14)
BASOPHILS # BLD AUTO: 0.1 10E9/L (ref 0–0.2)
BASOPHILS NFR BLD AUTO: 0.4 %
BUN SERPL-MCNC: 14 MG/DL (ref 7–30)
CALCIUM SERPL-MCNC: 10 MG/DL (ref 8.5–10.1)
CHLORIDE SERPL-SCNC: 101 MMOL/L (ref 94–109)
CHOLEST SERPL-MCNC: 162 MG/DL
CO2 SERPL-SCNC: 33 MMOL/L (ref 20–32)
CREAT SERPL-MCNC: 1.01 MG/DL (ref 0.66–1.25)
DIFFERENTIAL METHOD BLD: ABNORMAL
EOSINOPHIL # BLD AUTO: 0.2 10E9/L (ref 0–0.7)
EOSINOPHIL NFR BLD AUTO: 1.5 %
ERYTHROCYTE [DISTWIDTH] IN BLOOD BY AUTOMATED COUNT: 13.6 % (ref 10–15)
GFR SERPL CREATININE-BSD FRML MDRD: 78 ML/MIN/{1.73_M2}
GLUCOSE SERPL-MCNC: 112 MG/DL (ref 70–99)
HCT VFR BLD AUTO: 56.3 % (ref 40–53)
HDLC SERPL-MCNC: 45 MG/DL
HGB BLD-MCNC: 19.4 G/DL (ref 13.3–17.7)
LDLC SERPL CALC-MCNC: 67 MG/DL
LYMPHOCYTES # BLD AUTO: 3.6 10E9/L (ref 0.8–5.3)
LYMPHOCYTES NFR BLD AUTO: 28.9 %
MCH RBC QN AUTO: 31.2 PG (ref 26.5–33)
MCHC RBC AUTO-ENTMCNC: 34.5 G/DL (ref 31.5–36.5)
MCV RBC AUTO: 91 FL (ref 78–100)
MONOCYTES # BLD AUTO: 1.4 10E9/L (ref 0–1.3)
MONOCYTES NFR BLD AUTO: 11 %
NEUTROPHILS # BLD AUTO: 7.2 10E9/L (ref 1.6–8.3)
NEUTROPHILS NFR BLD AUTO: 58.2 %
NONHDLC SERPL-MCNC: 117 MG/DL
PLATELET # BLD AUTO: 300 10E9/L (ref 150–450)
POTASSIUM SERPL-SCNC: 3.3 MMOL/L (ref 3.4–5.3)
PSA SERPL-ACNC: 4.86 UG/L (ref 0–4)
RBC # BLD AUTO: 6.21 10E12/L (ref 4.4–5.9)
SODIUM SERPL-SCNC: 138 MMOL/L (ref 133–144)
TRIGL SERPL-MCNC: 252 MG/DL
WBC # BLD AUTO: 12.4 10E9/L (ref 4–11)

## 2021-03-01 PROCEDURE — 80061 LIPID PANEL: CPT | Performed by: INTERNAL MEDICINE

## 2021-03-01 PROCEDURE — 36415 COLL VENOUS BLD VENIPUNCTURE: CPT | Performed by: INTERNAL MEDICINE

## 2021-03-01 PROCEDURE — 85025 COMPLETE CBC W/AUTO DIFF WBC: CPT | Performed by: INTERNAL MEDICINE

## 2021-03-01 PROCEDURE — 99396 PREV VISIT EST AGE 40-64: CPT | Performed by: INTERNAL MEDICINE

## 2021-03-01 PROCEDURE — G0103 PSA SCREENING: HCPCS | Performed by: INTERNAL MEDICINE

## 2021-03-01 PROCEDURE — 80048 BASIC METABOLIC PNL TOTAL CA: CPT | Performed by: INTERNAL MEDICINE

## 2021-03-01 RX ORDER — VARENICLINE TARTRATE 1 MG/1
1 TABLET, FILM COATED ORAL 2 TIMES DAILY
Qty: 62 TABLET | Refills: 3 | Status: SHIPPED | OUTPATIENT
Start: 2021-03-01 | End: 2021-07-12

## 2021-03-01 RX ORDER — AMLODIPINE BESYLATE 5 MG/1
5 TABLET ORAL DAILY
Qty: 90 TABLET | Refills: 3 | Status: SHIPPED | OUTPATIENT
Start: 2021-03-01 | End: 2022-04-07

## 2021-03-01 RX ORDER — TRIAMTERENE AND HYDROCHLOROTHIAZIDE 75; 50 MG/1; MG/1
1 TABLET ORAL DAILY
Qty: 90 TABLET | Refills: 3 | Status: SHIPPED | OUTPATIENT
Start: 2021-03-01 | End: 2022-04-07

## 2021-03-01 RX ORDER — PRAVASTATIN SODIUM 80 MG/1
80 TABLET ORAL DAILY
Qty: 90 TABLET | Refills: 3 | Status: SHIPPED | OUTPATIENT
Start: 2021-03-01 | End: 2022-04-07

## 2021-03-01 ASSESSMENT — ENCOUNTER SYMPTOMS
COUGH: 0
DIARRHEA: 0
SORE THROAT: 0
CHILLS: 0
PARESTHESIAS: 0
HEADACHES: 0
HEARTBURN: 0
FEVER: 0
DIZZINESS: 0
SHORTNESS OF BREATH: 0
CONSTIPATION: 0
HEMATOCHEZIA: 0
NAUSEA: 0
WEAKNESS: 0
NERVOUS/ANXIOUS: 0
MYALGIAS: 0
FREQUENCY: 1
EYE PAIN: 0
HEMATURIA: 0
PALPITATIONS: 0
DYSURIA: 0
ARTHRALGIAS: 0
ABDOMINAL PAIN: 0
JOINT SWELLING: 0

## 2021-03-01 ASSESSMENT — MIFFLIN-ST. JEOR: SCORE: 1650.71

## 2021-03-01 NOTE — PROGRESS NOTES
SUBJECTIVE:   CC: Mayur Sheth is an 66 year old male who presents for preventative health visit.   Patient has been advised of split billing requirements and indicates understanding: Yes     Healthy Habits:     Getting at least 3 servings of Calcium per day:  Yes    Bi-annual eye exam:  Yes    Dental care twice a year:  NO    Sleep apnea or symptoms of sleep apnea:  None    Diet:  Other    Frequency of exercise:  None    Taking medications regularly:  Yes    Medication side effects:  Not applicable    PHQ-2 Total Score: 0    Additional concerns today:  No    [employer] computers.  Full health benefits.  The max  Aspirin 1-2 timeswekaly  chantyx (       Today's PHQ-2 Score:   PHQ-2 ( 1999 Pfizer) 2/26/2021   Q1: Little interest or pleasure in doing things 0   Q2: Feeling down, depressed or hopeless 0   PHQ-2 Score 0   Q1: Little interest or pleasure in doing things Not at all   Q2: Feeling down, depressed or hopeless Not at all   PHQ-2 Score 0       Abuse: Current or Past(Physical, Sexual or Emotional)- No  Do you feel safe in your environment? Yes    Have you ever done Advance Care Planning? (For example, a Health Directive, POLST, or a discussion with a medical provider or your loved ones about your wishes): No, advance care planning information given to patient to review.  Patient plans to discuss their wishes with loved ones or provider.      Social History     Tobacco Use     Smoking status: Current Every Day Smoker     Packs/day: 1.00     Years: 45.00     Pack years: 45.00     Types: Cigarettes     Smokeless tobacco: Never Used     Tobacco comment: requit, uses Chantix successfully   Substance Use Topics     Alcohol use: Yes     Alcohol/week: 6.0 standard drinks     Types: 6 Standard drinks or equivalent per week     Frequency: 2-4 times a month     Drinks per session: 1 or 2     Binge frequency: Never     Comment: 5 beers per month      If you drink alcohol do you typically have >3 drinks per day or >7 drinks  per week? No  Epi pen   5-6 times, hives 2 benedryl   Not needed    Bee sting    Alcohol Use 2/26/2021   Prescreen: >3 drinks/day or >7 drinks/week? No   Prescreen: >3 drinks/day or >7 drinks/week? -   No flowsheet data found.    Last PSA:   PSA   Date Value Ref Range Status   10/21/2019 3.67 0 - 4 ug/L Final     Comment:     Assay Method:  Chemiluminescence using Siemens Vista analyzer       Reviewed orders with patient. Reviewed health maintenance and updated orders accordingly - Yes  Lab work is in process  Labs reviewed in EPIC  BP Readings from Last 3 Encounters:   03/01/21 (!) 148/86   07/08/20 138/82   06/24/20 (!) 155/90    Wt Readings from Last 3 Encounters:   03/01/21 93 kg (205 lb)   06/24/20 99.3 kg (219 lb)   06/01/20 97.5 kg (215 lb)                  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     Other tear of medial meniscus of left knee, unspecified whether old or current tear, subsequent encounter     Chronic pain of left knee     Aftercare following surgery of the musculoskeletal system     Past Surgical History:   Procedure Laterality Date     ARTHROSCOPIC RECONSTRUCTION ANTERIOR CRUCIATE LIGAMENT Left 6/12/2020    Procedure: Left Revision ACL RECONSTRUCTION with allograft, and partial medial meniscectomy;  Surgeon: Donell Brower MD;  Location: [facility] OR     ARTHROSCOPY SHLDR ROTATOR CUFF REPAIR, SUBACROMIAL DECOMP, DIST CLAVICLE RESECTION, BICEP TENODESIS Left 12/14/2016    Procedure: ARTHROSCOPY SHOULDER  ROTATOR CUFF REPAIR, SUBACROMIAL DECOMPRESSION, DISTAL CLAVICLE RESECTION, OPEN BICEP TENODESIS REPAIR;  Surgeon: Donell Brower MD;  Location: [facility] OR     C REPAIR CRUCIATE LIGAMENT,KNEE  ~    LT ACL     TONSILLECTOMY & ADENOIDECTOMY         Social History     Tobacco Use     Smoking status: Current Every Day Smoker     Packs/day: 1.00     Years: 45.00     Pack years: 45.00     Types: Cigarettes     Smokeless tobacco: Never Used     Tobacco comment: requit, uses Chantix successfully   Substance Use Topics     Alcohol use: Yes     Alcohol/week: 6.0 standard drinks     Frequency: 2-4 times a month     Drinks per session: 1 or 2     Binge frequency: Never     Comment: 5 beers per month      Family History   Problem Relation Age of Onset     Hypertension Mother      Diabetes Mother      Heart Disease Mother         AFib     Myocardial Infarction Mother      Cerebrovascular Disease Mother      Heart Failure Mother              Hypertension Father      Myocardial Infarction Father 62             Diabetes Maternal Grandmother      Cancer - colorectal Paternal Grandmother      Diabetes Paternal Grandmother      Cerebrovascular Disease Paternal Grandmother      Myocardial Infarction Brother 43        MI, multiple     Psoriasis Sister      Thyroid Disease No family hx of      Glaucoma No family hx of      Macular Degeneration No family hx of      Anesthesia Reaction No family hx of      Thrombophilia No family hx of      Bleeding Disorder No family hx of          Current Outpatient Medications   Medication Sig Dispense Refill     amLODIPine (NORVASC) 5 MG tablet Take 1 tablet by mouth daily 90 tablet 0     MULTI-VITAMIN OR TABS 1 TABLET DAILY       pravastatin (PRAVACHOL) 80 MG tablet Take 1 tablet (80 mg) by mouth daily 90 tablet 1     Probiotic Product (PROBIOTIC DAILY PO) Take 0.5 chew tab by mouth daily       triamterene-HCTZ (MAXZIDE) 75-50 MG tablet Take 1 tablet by mouth daily 90 tablet 1      Allergies   Allergen Reactions     No Clinical Screening - See Comments Anaphylaxis     Detergents,        Bee      Morphine      Recent Labs   Lab Test 10/21/19  0832 09/26/18  0836 06/15/17  1808   LDL 65 55 56   HDL 41 41 38*   TRIG 273* 191* 285*   ALT 44 36 41   CR 0.89 0.93 0.98   GFRESTIMATED >90 83 78   GFRESTBLACK >90 >90 >90  African American GFR Calc     POTASSIUM 3.8 3.9 3.6        Reviewed and updated as needed this visit by clinical staff                 Reviewed and updated as needed this visit by Provider                  Lotions, perfume, soaps         Review of Systems   Constitutional: Negative for chills and fever.   HENT: Negative for congestion, ear pain, hearing loss and sore throat.    Eyes: Negative for pain and visual disturbance.   Respiratory: Negative for cough and shortness of breath.    Cardiovascular: Negative for chest pain, palpitations and peripheral edema.   Gastrointestinal: Negative for abdominal pain, constipation, diarrhea, heartburn, hematochezia and nausea.   Genitourinary: Positive for frequency, impotence and urgency. Negative for discharge, dysuria, genital sores and hematuria.   Musculoskeletal: Negative for arthralgias, joint swelling and myalgias.   Skin: Negative for rash.   Neurological: Negative for dizziness, weakness, headaches and paresthesias.   Psychiatric/Behavioral: Negative for mood changes. The patient is not nervous/anxious.      CONSTITUTIONAL: NEGATIVE for fever, chills, change in weight  INTEGUMENTARY/SKIN: NEGATIVE for worrisome rashes, moles or lesions  EYES: NEGATIVE for vision changes or irritation  ENT: NEGATIVE for ear, mouth and throat problems  RESP: NEGATIVE for significant cough or SOB  CV: NEGATIVE for chest pain, palpitations or peripheral edema  GI: NEGATIVE for nausea, abdominal pain, heartburn, or change in bowel habits   male: negative for dysuria, hematuria, decreased urinary stream, erectile dysfunction, urethral  discharge  MUSCULOSKELETAL: NEGATIVE for significant arthralgias or myalgia  NEURO: NEGATIVE for weakness, dizziness or paresthesias  PSYCHIATRIC: NEGATIVE for changes in mood or affect    OBJECTIVE:   There were no vitals taken for this visit.    Physical Exam  GENERAL: healthy, alert and no distress  EYES: Eyes grossly normal to inspection, PERRL and conjunctivae and sclerae normal  HENT: ear canals and TM's normal, nose and mouth without ulcers or lesions  NECK: hard 1 cm mass posterior to the jaw not quite in the anterior lymph chain  , no asymmetry, masses, or scars and thyroid normal to palpation  RESP: lungs clear to auscultation - no rales, rhonchi or wheezes  CV: regular rate and rhythm, normal S1 S2, no S3 or S4, no murmur, click or rub, no peripheral edema and peripheral pulses strong  ABDOMEN: soft, nontender, no hepatosplenomegaly, no masses and bowel sounds normal  MS: no gross musculoskeletal defects noted, no edema  SKIN: no suspicious lesions or rashes  NEURO: Normal strength and tone, mentation intact and speech normal  BACK: no CVA tenderness, no paralumbar tenderness  PSYCH: mentation appears normal, affect normal/bright  LYMPH: no cervical, supraclavicular, axillary, or inguinal adenopathy    Diagnostic Test Results:  Labs reviewed in Epic  Results for orders placed or performed in visit on 03/01/21   Lipid panel reflex to direct LDL Fasting     Status: Abnormal   Result Value Ref Range    Cholesterol 162 <200 mg/dL    Triglycerides 252 (H) <150 mg/dL    HDL Cholesterol 45 >39 mg/dL    LDL Cholesterol Calculated 67 <100 mg/dL    Non HDL Cholesterol 117 <130 mg/dL   Basic metabolic panel  (Ca, Cl, CO2, Creat, Gluc, K, Na, BUN)     Status: Abnormal   Result Value Ref Range    Sodium 138 133 - 144 mmol/L    Potassium 3.3 (L) 3.4 - 5.3 mmol/L    Chloride 101 94 - 109 mmol/L    Carbon Dioxide 33 (H) 20 - 32 mmol/L    Anion Gap 4 3 - 14 mmol/L    Glucose 112 (H) 70 - 99 mg/dL    Urea Nitrogen 14 7 -  30 mg/dL    Creatinine 1.01 0.66 - 1.25 mg/dL    GFR Estimate 78 >60 mL/min/[1.73_m2]    GFR Estimate If Black >90 >60 mL/min/[1.73_m2]    Calcium 10.0 8.5 - 10.1 mg/dL   PSA, screen     Status: Abnormal   Result Value Ref Range    PSA 4.86 (H) 0 - 4 ug/L   CBC with platelets and differential     Status: Abnormal   Result Value Ref Range    WBC 12.4 (H) 4.0 - 11.0 10e9/L    RBC Count 6.21 (H) 4.4 - 5.9 10e12/L    Hemoglobin 19.4 (H) 13.3 - 17.7 g/dL    Hematocrit 56.3 (H) 40.0 - 53.0 %    MCV 91 78 - 100 fl    MCH 31.2 26.5 - 33.0 pg    MCHC 34.5 31.5 - 36.5 g/dL    RDW 13.6 10.0 - 15.0 %    Platelet Count 300 150 - 450 10e9/L    % Neutrophils 58.2 %    % Lymphocytes 28.9 %    % Monocytes 11.0 %    % Eosinophils 1.5 %    % Basophils 0.4 %    Absolute Neutrophil 7.2 1.6 - 8.3 10e9/L    Absolute Lymphocytes 3.6 0.8 - 5.3 10e9/L    Absolute Monocytes 1.4 (H) 0.0 - 1.3 10e9/L    Absolute Eosinophils 0.2 0.0 - 0.7 10e9/L    Absolute Basophils 0.1 0.0 - 0.2 10e9/L    Diff Method Automated Method        ASSESSMENT/PLAN:   Mayur was seen today for physical.    Diagnoses and all orders for this visit:    Routine general medical examination at a health care facility    Essential hypertension with goal blood pressure less than 140/90  -     amLODIPine (NORVASC) 5 MG tablet; Take 1 tablet (5 mg) by mouth daily  -     triamterene-HCTZ (MAXZIDE) 75-50 MG tablet; Take 1 tablet by mouth daily  -     Basic metabolic panel  (Ca, Cl, CO2, Creat, Gluc, K, Na, BUN)  -     CBC with platelets and differential    Hyperlipidemia LDL goal <100  -     pravastatin (PRAVACHOL) 80 MG tablet; Take 1 tablet (80 mg) by mouth daily  -     Lipid panel reflex to direct LDL Fasting    Encounter for smoking cessation counseling  -     varenicline (CHANTIX) 1 MG tablet; Take 1 tablet (1 mg) by mouth 2 times daily    Special screening for malignant neoplasm of prostate  -     PSA, screen    Lymphadenopathy  -     amoxicillin-clavulanate (AUGMENTIN)  "875-125 MG tablet; Take 1 tablet by mouth 2 times daily for 10 days    If not improved with 10 days of antibiotics, needs CT scan with concern for malignancy        Patient has been advised of split billing requirements and indicates understanding: Yes  COUNSELING:   Reviewed preventive health counseling, as reflected in patient instructions       Regular exercise       Healthy diet/nutrition       Vision screening       Hearing screening       Safe sex practices/STD prevention       Colon cancer screening       Osteoporosis prevention/bone health    Estimated body mass index is 36.16 kg/m  as calculated from the following:    Height as of 6/24/20: 1.657 m (5' 5.25\").    Weight as of 6/24/20: 99.3 kg (219 lb).     Weight management plan: Discussed healthy diet and exercise guidelines    He reports that he has been smoking cigarettes. He has a 45.00 pack-year smoking history. He has never used smokeless tobacco.  Tobacco Cessation Action Plan:         Counseling Resources:  ATP IV Guidelines  Pooled Cohorts Equation Calculator  FRAX Risk Assessment  ICSI Preventive Guidelines  Dietary Guidelines for Americans, 2010  USDA's MyPlate  ASA Prophylaxis  Lung CA Screening    Kevin Jackson MD  [facility]  "

## 2021-03-19 ENCOUNTER — IMMUNIZATION (OUTPATIENT)
Dept: NURSING | Facility: CLINIC | Age: 66
End: 2021-03-19
Payer: OTHER GOVERNMENT

## 2021-03-19 PROCEDURE — 0001A PR COVID VAC PFIZER DIL RECON 30 MCG/0.3 ML IM: CPT

## 2021-03-19 PROCEDURE — 91300 PR COVID VAC PFIZER DIL RECON 30 MCG/0.3 ML IM: CPT

## 2021-04-02 ENCOUNTER — E-VISIT (OUTPATIENT)
Dept: FAMILY MEDICINE | Facility: CLINIC | Age: 66
End: 2021-04-02
Payer: OTHER GOVERNMENT

## 2021-04-02 ENCOUNTER — TRANSFERRED RECORDS (OUTPATIENT)
Dept: HEALTH INFORMATION MANAGEMENT | Facility: CLINIC | Age: 66
End: 2021-04-02

## 2021-04-02 DIAGNOSIS — G44.59 OTHER COMPLICATED HEADACHE SYNDROME: Primary | ICD-10-CM

## 2021-04-02 LAB
ALT SERPL-CCNC: 51 IU/L (ref 8–45)
AST SERPL-CCNC: 31 IU/L (ref 2–40)
INR PPP: 0.9

## 2021-04-02 PROCEDURE — 99421 OL DIG E/M SVC 5-10 MIN: CPT | Performed by: INTERNAL MEDICINE

## 2021-04-03 LAB
CREAT SERPL-MCNC: 0.95 MG/DL (ref 0.72–1.25)
GFR SERPL CREATININE-BSD FRML MDRD: >60 ML/MIN/1.73M2
GLUCOSE SERPL-MCNC: 173 MG/DL (ref 65–100)
POTASSIUM SERPL-SCNC: 3.9 MMOL/L (ref 3.5–5)

## 2021-04-09 ENCOUNTER — IMMUNIZATION (OUTPATIENT)
Dept: NURSING | Facility: CLINIC | Age: 66
End: 2021-04-09
Attending: INTERNAL MEDICINE
Payer: OTHER GOVERNMENT

## 2021-04-09 PROCEDURE — 91300 PR COVID VAC PFIZER DIL RECON 30 MCG/0.3 ML IM: CPT

## 2021-04-09 PROCEDURE — 0002A PR COVID VAC PFIZER DIL RECON 30 MCG/0.3 ML IM: CPT

## 2021-07-11 DIAGNOSIS — Z71.6 ENCOUNTER FOR SMOKING CESSATION COUNSELING: ICD-10-CM

## 2021-07-12 RX ORDER — VARENICLINE TARTRATE 1 MG/1
TABLET, FILM COATED ORAL
Qty: 62 TABLET | Refills: 0 | Status: SHIPPED | OUTPATIENT
Start: 2021-07-12 | End: 2022-03-30

## 2021-07-12 NOTE — TELEPHONE ENCOUNTER
"Routing refill request to provider for review/approval because:  Drug not active on patient's medication list      Requested Prescriptions   Pending Prescriptions Disp Refills     CHANTIX 1 MG tablet [Pharmacy Med Name: Chantix Oral Tablet 1 MG] 62 tablet 0     Sig: Take 1 tablet (1 mg) by mouth 2 times daily       Partial Cholinergic Nicotinic Agonist Agents Failed - 7/11/2021  7:57 AM        Failed - Medication is active on med list        Passed - Blood pressure under 140/90 in past 12 months     BP Readings from Last 3 Encounters:   03/01/21 138/74   07/08/20 138/82   06/24/20 (!) 155/90                 Passed - Recent (12 mo) or future (30 days) visit within the authorizing provider's specialty     Patient has had an office visit with the authorizing provider or a provider within the authorizing providers department within the previous 12 mos or has a future within next 30 days. See \"Patient Info\" tab in inbasket, or \"Choose Columns\" in Meds & Orders section of the refill encounter.              Passed - Patient is 18 years of age or older           Renata Singh RN on 7/12/2021 at 3:43 PM    "

## 2021-09-04 ENCOUNTER — HEALTH MAINTENANCE LETTER (OUTPATIENT)
Age: 66
End: 2021-09-04

## 2021-12-02 ENCOUNTER — MYC MEDICAL ADVICE (OUTPATIENT)
Dept: PEDIATRICS | Facility: CLINIC | Age: 66
End: 2021-12-02
Payer: OTHER GOVERNMENT

## 2021-12-02 DIAGNOSIS — Z86.0100 HISTORY OF COLONIC POLYPS: Primary | ICD-10-CM

## 2022-03-29 ENCOUNTER — TELEPHONE (OUTPATIENT)
Dept: GASTROENTEROLOGY | Facility: CLINIC | Age: 67
End: 2022-03-29
Payer: MEDICARE

## 2022-03-29 DIAGNOSIS — Z11.59 ENCOUNTER FOR SCREENING FOR OTHER VIRAL DISEASES: Primary | ICD-10-CM

## 2022-03-29 DIAGNOSIS — Z71.6 ENCOUNTER FOR SMOKING CESSATION COUNSELING: ICD-10-CM

## 2022-03-29 NOTE — TELEPHONE ENCOUNTER
Screening Questions  BlueKIND OF PREP RedLOCATION [review exclusion criteria] GreenSEDATION TYPE  1. Have you had a positive covid test in the last 90 days? N     2. Are you active on mychart? Y-but wants info mailed    3. What insurance is in the chart? [employer]    3.   Ordering/Referring Provider: Rusty    4. BMI 32.3 [BMI OVER 40-EXTENDED PREP]  If greater than 40 review exclusion criteria [PAC APPT IF @ [facility]]        5.  Respiratory Screening :  [If yes to any of the following HOSPITAL setting only]     Do you use daily home oxygen? N    Do you have mod to severe Obstructive Sleep Apnea? N  [OKAY @ [facility] SH PH RI]   Do you have Pulmonary Hypertension? N     Do you have UNCONTROLLED asthma? N        6.   Have you had a heart or lung transplant? N      7.   Are you currently on dialysis? N [ If yes, G-PREP & HOSPITAL setting only]     8.   Do you have chronic kidney disease? N [ If yes, G-PREP ]    9.   Have you had a stroke or Transient ischemic attack (TIA - aka  mini stroke ) within 6 months?  N (If yes, please review exclusion criteria)    10.   In the past 6 months, have you had any heart related issues including cardiomyopathy or heart attack? N           If yes, did it require cardiac stenting or other implantable device? N      11.   Do you have any implantable devices in your body (pacemaker, defib, LVAD)? N (If yes, please review exclusion criteria)    12.   Do you take nitroglycerin? N           If yes, how often? NA  (if yes, HOSPITAL setting ONLY)    13.   Are you currently taking any blood thinners? N           [IF YES, INFORM PATIENT TO FOLLOW UP W/ ORDERING PROVIDER FOR BRIDGING INSTRUCTIONS]     14.   Do you have a diagnosis of diabetes? N   [ If yes, G-PREP ]    15.   [FEMALES] Are you currently pregnant? NA    If yes, how many weeks? NA    16.   Are you taking any prescription pain medications on a routine schedule?  N  [ If yes, EXTENDED PREP.] [If yes, MAC]    17.   Do you have any chemical  dependencies such as alcohol, street drugs, or methadone?  N [If yes, MAC]    18.   Do you have any history of post-traumatic stress syndrome, severe anxiety or history of psychosis?  N  [If yes, MAC]    19.   Do you have a significant intellectual disability?  N [If yes, MAC]    20.   Do you transfer independently?  Yes    21.  On a regular basis do you go 3-5 days between bowel movements? N   [ If yes, EXTENDED PREP.]    22.   Preferred LOCAL Pharmacy for Pre Prescription   [facility] [address] [place] [address]      Scheduling Details      Caller : Jose Manuel  (Please ask for phone number if not scheduled by patient)    Type of Procedure Scheduled: Colon  Which Colonoscopy Prep was Sent?: Miralax  KHORUTS CF PATIENTS & GROEN'S PATIENTS NEEDS EXTENDED PREP  Surgeon: Eder  Date of Procedure: 4/28/22  Location: [facility]      Sedation Type: CS  Conscious Sedation- Needs  for 6 hours after the procedure  MAC/General-Needs  for 24 hours after procedure    Pre-op Required at [facility] [place] [place] and OR for MAC sedation: NA  (advise patient they will need a pre-op prior to procedure -)      Informed patient they will need an adult  Yes  Cannot take any type of public or medical transportation alone    Pre-Procedure Covid test to be completed at [facility] or Externally: Janiya 4/25/22    Confirmed Nurse will call to complete assessment Yes    Additional comments:

## 2022-03-30 RX ORDER — VARENICLINE TARTRATE 1 MG/1
TABLET, FILM COATED ORAL
Qty: 62 TABLET | Refills: 0 | Status: SHIPPED | OUTPATIENT
Start: 2022-03-30 | End: 2022-05-10

## 2022-03-30 NOTE — TELEPHONE ENCOUNTER
"Routing refill request to provider for review/approval because:  Gap in medication, 1 month supply was last sent on 7/12/21      Requested Prescriptions   Pending Prescriptions Disp Refills     varenicline (CHANTIX) 1 MG tablet [Pharmacy Med Name: Varenicline Tartrate Oral Tablet 1 MG] 62 tablet 0     Sig: Take 1 tablet (1 mg) by mouth 2 times daily       Partial Cholinergic Nicotinic Agonist Agents Failed - 3/29/2022  4:02 PM        Failed - Blood pressure under 140/90 in past 12 months     BP Readings from Last 3 Encounters:   03/01/21 138/74   07/08/20 138/82   06/24/20 (!) 155/90                 Passed - Recent (12 mo) or future (30 days) visit within the authorizing provider's specialty     Patient has had an office visit with the authorizing provider or a provider within the authorizing providers department within the previous 12 mos or has a future within next 30 days. See \"Patient Info\" tab in inbasket, or \"Choose Columns\" in Meds & Orders section of the refill encounter.              Passed - Medication is active on med list        Passed - Patient is 18 years of age or older             "

## 2022-04-05 DIAGNOSIS — I10 ESSENTIAL HYPERTENSION WITH GOAL BLOOD PRESSURE LESS THAN 140/90: ICD-10-CM

## 2022-04-07 ENCOUNTER — OFFICE VISIT (OUTPATIENT)
Dept: FAMILY MEDICINE | Facility: CLINIC | Age: 67
End: 2022-04-07
Payer: MEDICARE

## 2022-04-07 VITALS
DIASTOLIC BLOOD PRESSURE: 72 MMHG | BODY MASS INDEX: 35.16 KG/M2 | HEART RATE: 78 BPM | WEIGHT: 211 LBS | OXYGEN SATURATION: 95 % | SYSTOLIC BLOOD PRESSURE: 134 MMHG | HEIGHT: 65 IN

## 2022-04-07 DIAGNOSIS — R00.2 PALPITATIONS: ICD-10-CM

## 2022-04-07 DIAGNOSIS — Z79.899 ENCOUNTER FOR LONG-TERM (CURRENT) USE OF MEDICATIONS: ICD-10-CM

## 2022-04-07 DIAGNOSIS — E66.01 MORBID OBESITY (H): ICD-10-CM

## 2022-04-07 DIAGNOSIS — I10 ESSENTIAL HYPERTENSION WITH GOAL BLOOD PRESSURE LESS THAN 140/90: ICD-10-CM

## 2022-04-07 DIAGNOSIS — Z11.4 SCREENING FOR HIV (HUMAN IMMUNODEFICIENCY VIRUS): ICD-10-CM

## 2022-04-07 DIAGNOSIS — Z23 NEED FOR COVID-19 VACCINE: Primary | ICD-10-CM

## 2022-04-07 DIAGNOSIS — Z13.6 SCREENING FOR AAA (ABDOMINAL AORTIC ANEURYSM): ICD-10-CM

## 2022-04-07 DIAGNOSIS — R97.20 ELEVATED PROSTATE SPECIFIC ANTIGEN (PSA): ICD-10-CM

## 2022-04-07 DIAGNOSIS — Z12.11 SCREEN FOR COLON CANCER: ICD-10-CM

## 2022-04-07 DIAGNOSIS — E78.5 HYPERLIPIDEMIA LDL GOAL <100: ICD-10-CM

## 2022-04-07 DIAGNOSIS — Z87.891 PERSONAL HISTORY OF TOBACCO USE: ICD-10-CM

## 2022-04-07 LAB
ALBUMIN SERPL-MCNC: 3.8 G/DL (ref 3.4–5)
ALP SERPL-CCNC: 95 U/L (ref 40–150)
ALT SERPL W P-5'-P-CCNC: 56 U/L (ref 0–70)
ANION GAP SERPL CALCULATED.3IONS-SCNC: 8 MMOL/L (ref 3–14)
AST SERPL W P-5'-P-CCNC: 29 U/L (ref 0–45)
BILIRUB SERPL-MCNC: 0.5 MG/DL (ref 0.2–1.3)
BUN SERPL-MCNC: 15 MG/DL (ref 7–30)
CALCIUM SERPL-MCNC: 9.1 MG/DL (ref 8.5–10.1)
CHLORIDE BLD-SCNC: 101 MMOL/L (ref 94–109)
CHOLEST SERPL-MCNC: 166 MG/DL
CO2 SERPL-SCNC: 31 MMOL/L (ref 20–32)
CREAT SERPL-MCNC: 0.93 MG/DL (ref 0.66–1.25)
FASTING STATUS PATIENT QL REPORTED: YES
GFR SERPL CREATININE-BSD FRML MDRD: >90 ML/MIN/1.73M2
GLUCOSE BLD-MCNC: 113 MG/DL (ref 70–99)
HDLC SERPL-MCNC: 46 MG/DL
LDLC SERPL CALC-MCNC: 57 MG/DL
NONHDLC SERPL-MCNC: 120 MG/DL
POTASSIUM BLD-SCNC: 3.7 MMOL/L (ref 3.4–5.3)
PROT SERPL-MCNC: 7.2 G/DL (ref 6.8–8.8)
PSA SERPL-MCNC: 5.34 UG/L (ref 0–4)
SODIUM SERPL-SCNC: 140 MMOL/L (ref 133–144)
TRIGL SERPL-MCNC: 317 MG/DL
TSH SERPL DL<=0.005 MIU/L-ACNC: 2.33 MU/L (ref 0.4–4)

## 2022-04-07 PROCEDURE — 0054A COVID-19,PF,PFIZER (12+ YRS): CPT | Performed by: INTERNAL MEDICINE

## 2022-04-07 PROCEDURE — G0103 PSA SCREENING: HCPCS | Performed by: INTERNAL MEDICINE

## 2022-04-07 PROCEDURE — 36415 COLL VENOUS BLD VENIPUNCTURE: CPT | Performed by: INTERNAL MEDICINE

## 2022-04-07 PROCEDURE — 80053 COMPREHEN METABOLIC PANEL: CPT | Performed by: INTERNAL MEDICINE

## 2022-04-07 PROCEDURE — 84443 ASSAY THYROID STIM HORMONE: CPT | Performed by: INTERNAL MEDICINE

## 2022-04-07 PROCEDURE — G0402 INITIAL PREVENTIVE EXAM: HCPCS | Performed by: INTERNAL MEDICINE

## 2022-04-07 PROCEDURE — 91305 COVID-19,PF,PFIZER (12+ YRS): CPT | Performed by: INTERNAL MEDICINE

## 2022-04-07 PROCEDURE — 80061 LIPID PANEL: CPT | Performed by: INTERNAL MEDICINE

## 2022-04-07 RX ORDER — DIPHENHYDRAMINE HCL 25 MG
25 CAPSULE ORAL EVERY 6 HOURS PRN
COMMUNITY

## 2022-04-07 RX ORDER — TRIAMTERENE AND HYDROCHLOROTHIAZIDE 75; 50 MG/1; MG/1
1 TABLET ORAL DAILY
Qty: 90 TABLET | Refills: 3 | Status: SHIPPED | OUTPATIENT
Start: 2022-04-07 | End: 2023-04-06

## 2022-04-07 RX ORDER — PRAVASTATIN SODIUM 80 MG/1
80 TABLET ORAL DAILY
Qty: 90 TABLET | Refills: 3 | Status: SHIPPED | OUTPATIENT
Start: 2022-04-07 | End: 2023-04-10

## 2022-04-07 RX ORDER — AMLODIPINE BESYLATE 5 MG/1
5 TABLET ORAL DAILY
Qty: 90 TABLET | Refills: 3 | Status: SHIPPED | OUTPATIENT
Start: 2022-04-07 | End: 2023-05-09

## 2022-04-07 RX ORDER — TRIAMTERENE AND HYDROCHLOROTHIAZIDE 75; 50 MG/1; MG/1
TABLET ORAL
Qty: 90 TABLET | Refills: 0 | OUTPATIENT
Start: 2022-04-07

## 2022-04-07 ASSESSMENT — ENCOUNTER SYMPTOMS
DIARRHEA: 0
SORE THROAT: 0
CHILLS: 0
CONSTIPATION: 0
FEVER: 0
WEAKNESS: 0
PALPITATIONS: 0
HEARTBURN: 0
ARTHRALGIAS: 1
ABDOMINAL PAIN: 0
EYE PAIN: 0
MYALGIAS: 0
DIZZINESS: 0
DYSURIA: 0
SHORTNESS OF BREATH: 0
FREQUENCY: 0
JOINT SWELLING: 0
PARESTHESIAS: 1
HEADACHES: 0
COUGH: 0
HEMATOCHEZIA: 0
HEMATURIA: 0
NAUSEA: 0
NERVOUS/ANXIOUS: 0

## 2022-04-07 ASSESSMENT — ACTIVITIES OF DAILY LIVING (ADL): CURRENT_FUNCTION: NO ASSISTANCE NEEDED

## 2022-04-07 NOTE — PROGRESS NOTES
"  Lung Cancer Screening Shared Decision Making Visit     Mayur Sheth is eligible for lung cancer screening on the basis of the information provided in my signed lung cancer screening order.     I have discussed with patient the risks and benefits of screening for lung cancer with low-dose CT.     The risks include:  radiation exposure: one low dose chest CT has as much ionizing radiation as about 15 chest x-rays or 6 months of background radiation living in [place]    false positives: 96% of positive findings/nodules are NOT cancer, but some might still require additional diagnostic evaluation, including biopsy  over-diagnosis: some slow growing cancers that might never have been clinically significant will be detected and treated unnecessarily     The benefit of early detection of lung cancer is contingent upon adherence to annual screening or more frequent follow up if indicated.     Furthermore, reaping the benefits of screening requires Mayur Sheth to be willing and physically able to undergo diagnostic procedures, if indicated. Although no specific guide is available for determining severity of comorbidities, it is reasonable to withhold screening in patients who have greater mortality risk from other diseases.     We did discuss that the only way to prevent lung cancer is to not smoke. Smoking cessation counseling was not given.      I did offer risk estimation using a calculator such as this one:    ShouldIScreen        Answers for HPI/ROS submitted by the patient on 4/7/2022  In general, how would you rate your overall physical health?: good  Frequency of exercise:: None  Do you usually eat at least 4 servings of fruit and vegetables a day, include whole grains & fiber, and avoid regularly eating high fat or \"junk\" foods? : Yes  Taking medications regularly:: Yes  Medication side effects:: None  Activities of Daily Living: no assistance needed  Home safety: no safety concerns " identified  Hearing Impairment:: no hearing concerns  In the past 6 months, have you been bothered by leaking of urine?: No  abdominal pain: No  Blood in stool: No  Blood in urine: No  chest pain: No  chills: No  congestion: Yes  constipation: No  cough: No  diarrhea: No  dizziness: No  ear pain: No  eye pain: No  nervous/anxious: No  fever: No  frequency: No  genital sores: No  headaches: No  hearing loss: No  heartburn: No  arthralgias: Yes  joint swelling: No  peripheral edema: No  mood changes: No  myalgias: No  nausea: No  dysuria: No  palpitations: No  Skin sensation changes: Yes  sore throat: No  urgency: No  rash: No  shortness of breath: No  visual disturbance: No  weakness: No  impotence: Yes  penile discharge: No  In general, how would you rate your overall mental or emotional health?: good  Additional concerns today:: No

## 2022-04-07 NOTE — PATIENT INSTRUCTIONS
Lung Cancer Screening   Frequently Asked Questions  If you are at high-risk for lung cancer, getting screened with low-dose computed tomography (LDCT) every year can help save your life. This handout offers answers to some of the most common questions about lung cancer screening. If you have other questions, please call [phone] ([phone]).     What is it?  Lung cancer screening uses special X-ray technology to create an image of your lung tissue. The exam is quick and easy and takes less than 10 seconds. We don t give you any medicine or use any needles. You can eat before and after the exam. You don t need to change your clothes as long as the clothing on your chest doesn t contain metal. But, you do need to be able to hold your breath for at least 6 seconds during the exam.    What is the goal of lung cancer screening?  The goal of lung cancer screening is to save lives. Many times, lung cancer is not found until a person starts having physical symptoms. Lung cancer screening can help detect lung cancer in the earliest stages when it may be easier to treat.    Who should be screened for lung cancer?  We suggest lung cancer screening for anyone who is at high-risk for lung cancer. You are in the high-risk group if you:      are between the ages of 55 and 79, and    have smoked at least 1 pack of cigarettes a day for 20 or more years, and    still smoke or have quit within the past 15 years.    However, if you have a new cough or shortness of breath, you should talk to your doctor before being screened.    Why does it matter if I have symptoms?  Certain symptoms can be a sign that you have a condition in your lungs that should be checked and treated by your doctor. These symptoms include fever, chest pain, a new or changing cough, shortness of breath that you have never felt before, coughing up blood or unexplained weight loss. Having any of these symptoms can greatly affect the results of lung  cancer screening.       Should all smokers get an LDCT lung cancer screening exam?  It depends. Lung cancer screening is for a very specific group of men and women who have a history of heavy smoking over a long period of time (see  Who should be screened for lung cancer  above).  I am in the high-risk group, but have been diagnosed with cancer in the past. Is LDCT lung cancer screening right for me?  In some cases, you should not have LDCT lung screening, such as when your doctor is already following your cancer with CT scan studies. Your doctor will help you decide if LDCT lung screening is right for you.  Do I need to have a screening exam every year?  Yes. If you are in the high-risk group described earlier, you should get an LDCT lung cancer screening exam every year until you are 79, or are no longer willing or able to undergo screening and possible procedures to diagnose and treat lung cancer.  How effective is LDCT at preventing death from lung cancer?  Studies have shown that LDCT lung cancer screening can lower the risk of death from lung cancer by 20 percent in people who are at high-risk.  What are the risks?  There are some risks and limitations of LDCT lung cancer screening. We want to make sure you understand the risks and benefits, so please let us know if you have any questions. Your doctor may want to talk with you more about these risks.    Radiation exposure: As with any exam that uses radiation, there is a very small increased risk of cancer. The amount of radiation in LDCT is small--about the same amount a person would get from a mammogram. Your doctor orders the exam when he or she feels the potential benefits outweigh the risks.    False negatives: No test is perfect, including LDCT. It is possible that you may have a medical condition, including lung cancer, that is not found during your exam. This is called a false negative result.    False positives and more testing: LDCT very often finds  something in the lung that could be cancer, but in fact is not. This is called a false positive result. False positive tests often cause anxiety. To make sure these findings are not cancer, you may need to have more tests. These tests will be done only if you give us permission. Sometimes patients need a treatment that can have side effects, such as a biopsy. For more information on false positives, see  What can I expect from the results?     Findings not related to lung cancer: Your LDCT exam also takes pictures of areas of your body next to your lungs. In a very small number of cases, the CT scan will show an abnormal finding in one of these areas, such as your kidneys, adrenal glands, liver or thyroid. This finding may not be serious, but you may need more tests. Your doctor can help you decide what other tests you may need, if any.  What can I expect from the results?  About 1 out of 4 LDCT exams will find something that may need more tests. Most of the time, these findings are lung nodules. Lung nodules are very small collections of tissue in the lung. These nodules are very common, and the vast majority--more than 97 percent--are not cancer (benign). Most are normal lymph nodes or small areas of scarring from past infections.  But, if a small lung nodule is found to be cancer, the cancer can be cured more than 90 percent of the time. To know if the nodule is cancer, we may need to get more images before your next yearly screening exam. If the nodule has suspicious features (for example, it is large, has an odd shape or grows over time), we will refer you to a specialist for further testing.  Will my doctor also get the results?  Yes. Your doctor will get a copy of your results.

## 2022-04-07 NOTE — PROGRESS NOTES
"SUBJECTIVE:   Mayur Sheth is a 67 year old male who presents for Preventive Visit.    Patient has been advised of split billing requirements and indicates understanding: Yes  Are you in the first 12 months of your Medicare coverage?  Yes,  Visual Acuity:  Sees eye doctor; last completed in January 2022    Healthy Habits:     In general, how would you rate your overall health?  Good    Frequency of exercise:  None    Do you usually eat at least 4 servings of fruit and vegetables a day, include whole grains    & fiber and avoid regularly eating high fat or \"junk\" foods?  Yes    Taking medications regularly:  Yes    Medication side effects:  None    Ability to successfully perform activities of daily living:  No assistance needed    Home Safety:  No safety concerns identified    Hearing Impairment:  No hearing concerns    In the past 6 months, have you been bothered by leaking of urine?  No    In general, how would you rate your overall mental or emotional health?  Good      PHQ-2 Total Score: 0    Additional concerns today:  No    Do you feel safe in your environment? Yes    Have you ever done Advance Care Planning? (For example, a Health Directive, POLST, or a discussion with a medical provider or your loved ones about your wishes): No, advance care planning information given to patient to review.  Patient plans to discuss their wishes with loved ones or provider.         Fall risk  Fallen 2 or more times in the past year?: No  Any fall with injury in the past year?: No    Cognitive Screening   1) Repeat 3 items (Leader, Season, Table)    2) Clock draw: NORMAL  3) 3 item recall: Recalls 3 objects  Results: 3 items recalled: COGNITIVE IMPAIRMENT LESS LIKELY    Mini-CogTM Copyright DONNA Ko. Licensed by the author for use in [facility]; reprinted with permission ([email or web address]). All rights reserved.      Do you have sleep apnea, excessive snoring or daytime drowsiness?: no    Reviewed and updated as " needed this visit by clinical staff   Tobacco  Allergies  Meds              Reviewed and updated as needed this visit by Provider                 Social History     Tobacco Use     Smoking status: Former Smoker     Packs/day: 1.00     Years: 45.00     Pack years: 45.00     Types: Cigarettes     Quit date: 3/7/2022     Years since quittin.0     Smokeless tobacco: Never Used     Tobacco comment: requit, uses Chantix successfully   Substance Use Topics     Alcohol use: Yes     Alcohol/week: 6.0 standard drinks     Comment: 5 beers per month      If you drink alcohol do you typically have >3 drinks per day or >7 drinks per week? No    Alcohol Use 2022   Prescreen: >3 drinks/day or >7 drinks/week? No   Prescreen: >3 drinks/day or >7 drinks/week? -     Colonoscopy done on this date: scanned            Current providers sharing in care for this patient include:   Patient Care Team:  Kevin Jackson MD as PCP - General (Internal Medicine - Pediatrics)  Kevin Jackson MD as Assigned PCP    The following health maintenance items are reviewed in Epic and correct as of today:  Health Maintenance Due   Topic Date Due     URINE DRUG SCREEN  Never done     ANNUAL REVIEW OF HM ORDERS  Never done     HIV SCREENING  Never done     LUNG CANCER SCREENING  Never done     ZOSTER IMMUNIZATION (2 of 3) 2018     COLORECTAL CANCER SCREENING  2021     FALL RISK ASSESSMENT  Never done     AORTIC ANEURYSM SCREENING (SYSTEM ASSIGNED)  Never done     Pneumococcal Vaccine: 65+ Years (1 of 1 - PPSV23) 2022     Lab work is in process  Labs reviewed in EPIC  BP Readings from Last 3 Encounters:   22 134/72   21 138/74   20 138/82    Wt Readings from Last 3 Encounters:   22 95.7 kg (211 lb)   21 93 kg (205 lb)   20 99.3 kg (219 lb)                  Patient Active Problem List   Diagnosis     Family history of ischemic heart disease     Hyperlipidemia LDL goal <100     Essential  hypertension with goal blood pressure less than 140/90     Gastroesophageal reflux disease without esophagitis     Diverticulosis     Obesity, Class II, BMI 35-39.9, with comorbidity     Low back pain     AC (acromioclavicular) joint arthritis     Tubular adenoma of colon     Obesity (BMI 35.0-39.9) with comorbidity (H)     Prediabetes     Smoker     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     Other tear of medial meniscus of left knee, unspecified whether old or current tear, subsequent encounter     Chronic pain of left knee     Aftercare following surgery of the musculoskeletal system     Past Surgical History:   Procedure Laterality Date     ARTHROSCOPIC RECONSTRUCTION ANTERIOR CRUCIATE LIGAMENT Left 2020    Procedure: Left Revision ACL RECONSTRUCTION with allograft, and partial medial meniscectomy;  Surgeon: Donell Brower MD;  Location: [facility] OR     ARTHROSCOPY SHLDR ROTATOR CUFF REPAIR, SUBACROMIAL DECOMP, DIST CLAVICLE RESECTION, BICEP TENODESIS Left 2016    Procedure: ARTHROSCOPY SHOULDER ROTATOR CUFF REPAIR, SUBACROMIAL DECOMPRESSION, DISTAL CLAVICLE RESECTION, OPEN BICEP TENODESIS REPAIR;  Surgeon: Donell Brower MD;  Location: [facility] OR     TONSILLECTOMY & ADENOIDECTOMY       ZZC REPAIR CRUCIATE LIGAMENT,KNEE  ~    LT ACL       Social History     Tobacco Use     Smoking status: Former Smoker     Packs/day: 1.00     Years: 45.00     Pack years: 45.00     Types: Cigarettes     Quit date: 3/7/2022     Years since quittin.0     Smokeless tobacco: Never Used     Tobacco comment: requit, uses Chantix successfully   Substance Use Topics     Alcohol use: Yes     Alcohol/week: 6.0 standard drinks     Comment: 5 beers per month      Family History   Problem Relation Age of Onset     Hypertension Mother      Diabetes Mother      Heart Disease Mother         AFib     Myocardial Infarction Mother      Cerebrovascular Disease Mother      Heart Failure Mother               Hypertension Father      Myocardial Infarction Father 62             Diabetes Maternal Grandmother      Cancer - colorectal Paternal Grandmother      Diabetes Paternal Grandmother      Cerebrovascular Disease Paternal Grandmother      Myocardial Infarction Brother 43        MI, multiple     Psoriasis Sister      Thyroid Disease No family hx of      Glaucoma No family hx of      Macular Degeneration No family hx of      Anesthesia Reaction No family hx of      Thrombophilia No family hx of      Bleeding Disorder No family hx of          Current Outpatient Medications   Medication Sig Dispense Refill     amLODIPine (NORVASC) 5 MG tablet Take 1 tablet (5 mg) by mouth daily 90 tablet 3     diphenhydrAMINE (BENADRYL ALLERGY) 25 MG capsule Take 25 mg by mouth every 6 hours as needed for itching or allergies       MULTI-VITAMIN OR TABS 1 TABLET DAILY       pravastatin (PRAVACHOL) 80 MG tablet Take 1 tablet (80 mg) by mouth daily 90 tablet 3     Probiotic Product (PROBIOTIC DAILY PO) Take 0.5 chew tab by mouth daily       triamterene-HCTZ (MAXZIDE) 75-50 MG tablet Take 1 tablet by mouth daily 90 tablet 3     varenicline (CHANTIX) 1 MG tablet Take 1 tablet (1 mg) by mouth 2 times daily 62 tablet 0     Allergies   Allergen Reactions     No Clinical Screening - See Comments Anaphylaxis     Detergents,        Bee      Morphine      Pneumonia Vaccine:discussed upcoming switch to conjugate 20          Review of Systems   Constitutional: Negative for chills and fever.   HENT: Positive for congestion. Negative for ear pain, hearing loss and sore throat.    Eyes: Negative for pain and visual disturbance.   Respiratory: Negative for cough and shortness of breath.    Cardiovascular: Negative for chest pain, palpitations and peripheral edema.   Gastrointestinal: Negative for abdominal pain, constipation, diarrhea, heartburn, hematochezia and nausea.   Genitourinary: Positive for impotence. Negative for dysuria, frequency, genital  "sores, hematuria, penile discharge and urgency.   Musculoskeletal: Positive for arthralgias. Negative for joint swelling and myalgias.   Skin: Negative for rash.   Neurological: Positive for paresthesias. Negative for dizziness, weakness and headaches.   Psychiatric/Behavioral: Negative for mood changes. The patient is not nervous/anxious.      Constitutional, HEENT, cardiovascular, pulmonary, gi and gu systems are negative, except as otherwise noted.    OBJECTIVE:   /85 (BP Location: Right arm, Patient Position: Chair, Cuff Size: Adult Large)   Pulse 78   Ht 1.657 m (5' 5.25\")   Wt 95.7 kg (211 lb)   SpO2 95%   BMI 34.84 kg/m   Estimated body mass index is 34.84 kg/m  as calculated from the following:    Height as of this encounter: 1.657 m (5' 5.25\").    Weight as of this encounter: 95.7 kg (211 lb).  Physical Exam  GENERAL: healthy, alert and no distress  EYES: Eyes grossly normal to inspection, PERRL and conjunctivae and sclerae normal  HENT: ear canals and TM's normal, nose and mouth without ulcers or lesions  NECK: no adenopathy, no asymmetry, masses, or scars and thyroid normal to palpation  RESP: lungs clear to auscultation - no rales, rhonchi or wheezes  CV: regular rate and rhythm, normal S1 S2, no S3 or S4, no murmur, click or rub, no peripheral edema and peripheral pulses strong  ABDOMEN: soft, nontender, no hepatosplenomegaly, no masses and bowel sounds normal  MS: no gross musculoskeletal defects noted, no edema  SKIN: no suspicious lesions or rashes  NEURO: Normal strength and tone, mentation intact and speech normal  BACK: no CVA tenderness, no paralumbar tenderness  PSYCH: mentation appears normal, affect normal/bright  LYMPH: no cervical, supraclavicular, axillary, or inguinal adenopathy    Diagnostic Test Results:  Labs reviewed in Epic  Results for orders placed or performed in visit on 04/07/22   PSA, screen     Status: Abnormal   Result Value Ref Range    Prostate Specific Antigen " Screen 5.34 (H) 0.00 - 4.00 ug/L   TSH with free T4 reflex     Status: Normal   Result Value Ref Range    TSH 2.33 0.40 - 4.00 mU/L   Lipid panel reflex to direct LDL Fasting     Status: Abnormal   Result Value Ref Range    Cholesterol 166 <200 mg/dL    Triglycerides 317 (H) <150 mg/dL    Direct Measure HDL 46 >=40 mg/dL    LDL Cholesterol Calculated 57 <=100 mg/dL    Non HDL Cholesterol 120 <130 mg/dL    Patient Fasting > 8hrs? Yes     Narrative    Cholesterol  Desirable:  <200 mg/dL    Triglycerides  Normal:  Less than 150 mg/dL  Borderline High:  150-199 mg/dL  High:  200-499 mg/dL  Very High:  Greater than or equal to 500 mg/dL    Direct Measure HDL  Female:  Greater than or equal to 50 mg/dL   Male:  Greater than or equal to 40 mg/dL    LDL Cholesterol  Desirable:  <100mg/dL  Above Desirable:  100-129 mg/dL   Borderline High:  130-159 mg/dL   High:  160-189 mg/dL   Very High:  >= 190 mg/dL    Non HDL Cholesterol  Desirable:  130 mg/dL  Above Desirable:  130-159 mg/dL  Borderline High:  160-189 mg/dL  High:  190-219 mg/dL  Very High:  Greater than or equal to 220 mg/dL   Comprehensive metabolic panel (BMP + Alb, Alk Phos, ALT, AST, Total. Bili, TP)     Status: Abnormal   Result Value Ref Range    Sodium 140 133 - 144 mmol/L    Potassium 3.7 3.4 - 5.3 mmol/L    Chloride 101 94 - 109 mmol/L    Carbon Dioxide (CO2) 31 20 - 32 mmol/L    Anion Gap 8 3 - 14 mmol/L    Urea Nitrogen 15 7 - 30 mg/dL    Creatinine 0.93 0.66 - 1.25 mg/dL    Calcium 9.1 8.5 - 10.1 mg/dL    Glucose 113 (H) 70 - 99 mg/dL    Alkaline Phosphatase 95 40 - 150 U/L    AST 29 0 - 45 U/L    ALT 56 0 - 70 U/L    Protein Total 7.2 6.8 - 8.8 g/dL    Albumin 3.8 3.4 - 5.0 g/dL    Bilirubin Total 0.5 0.2 - 1.3 mg/dL    GFR Estimate >90 >60 mL/min/1.73m2       ASSESSMENT / PLAN:   Mayur was seen today for physical.    Diagnoses and all orders for this visit:    Need for COVID-19 vaccine  -     REVIEW OF HEALTH MAINTENANCE PROTOCOL ORDERS  -      "COVID-19,PF,PFIZER (12+ Yrs GRAY LABEL)    Encounter for long-term (current) use of medications    Screening for HIV (human immunodeficiency virus)    Screen for colon cancer  -     Adult Gastro Ref - Procedure Only; Future    Morbid obesity (H)    Personal history of tobacco use  -     Prof Fee: Shared Decision Making Visit for Lung Cancer Screening  -     CT Chest Lung Cancer Scrn Low Dose wo; Future    Hyperlipidemia LDL goal <100  -     Comprehensive metabolic panel (BMP + Alb, Alk Phos, ALT, AST, Total. Bili, TP); Future  -     Lipid panel reflex to direct LDL Fasting; Future  -     pravastatin (PRAVACHOL) 80 MG tablet; Take 1 tablet (80 mg) by mouth daily  -     Lipid panel reflex to direct LDL Fasting  -     Comprehensive metabolic panel (BMP + Alb, Alk Phos, ALT, AST, Total. Bili, TP)    Screening for AAA (abdominal aortic aneurysm)  -     [place] Aorta Medicare AAA Screening; Future    Palpitations  -     TSH with free T4 reflex; Future  -     TSH with free T4 reflex    Elevated prostate specific antigen (PSA)  -     PSA, screen; Future  -     PSA, screen    Essential hypertension with goal blood pressure less than 140/90  -     amLODIPine (NORVASC) 5 MG tablet; Take 1 tablet (5 mg) by mouth daily  -     triamterene-HCTZ (MAXZIDE) 75-50 MG tablet; Take 1 tablet by mouth daily        Patient has been advised of split billing requirements and indicates understanding: Yes    COUNSELING:  Reviewed preventive health counseling, as reflected in patient instructions       Regular exercise       Healthy diet/nutrition       Vision screening       Hearing screening       Colon cancer screening    Estimated body mass index is 34.84 kg/m  as calculated from the following:    Height as of this encounter: 1.657 m (5' 5.25\").    Weight as of this encounter: 95.7 kg (211 lb).    Weight management plan: Discussed healthy diet and exercise guidelines    He reports that he quit smoking about 4 weeks ago. His smoking use included " cigarettes. He has a 45.00 pack-year smoking history. He has never used smokeless tobacco.  Tobacco Cessation Action Plan:   Information offered: Patient not interested at this time      Appropriate preventive services were discussed with this patient, including applicable screening as appropriate for cardiovascular disease, diabetes, osteopenia/osteoporosis, and glaucoma.  As appropriate for age/gender, discussed screening for colorectal cancer, prostate cancer, breast cancer, and cervical cancer. Checklist reviewing preventive services available has been given to the patient.    Reviewed patients plan of care and provided an AVS. The Basic Care Plan (routine screening as documented in Health Maintenance) for Mayur meets the Care Plan requirement. This Care Plan has been established and reviewed with the Patient.    ICD-10-CM    1. Need for COVID-19 vaccine  Z23 REVIEW OF HEALTH MAINTENANCE PROTOCOL ORDERS     COVID-19,PF,PFIZER (12+ Yrs GRAY LABEL)   2. Encounter for long-term (current) use of medications  Z79.899    3. Screening for HIV (human immunodeficiency virus)  Z11.4    4. Screen for colon cancer  Z12.11 Adult Gastro Ref - Procedure Only   5. Morbid obesity (H)  E66.01    6. Personal history of tobacco use  Z87.891 Prof Fee: Shared Decision Making Visit for Lung Cancer Screening     CT Chest Lung Cancer Scrn Low Dose wo   7. Hyperlipidemia LDL goal <100  E78.5 Comprehensive metabolic panel (BMP + Alb, Alk Phos, ALT, AST, Total. Bili, TP)     Lipid panel reflex to direct LDL Fasting     pravastatin (PRAVACHOL) 80 MG tablet     Lipid panel reflex to direct LDL Fasting     Comprehensive metabolic panel (BMP + Alb, Alk Phos, ALT, AST, Total. Bili, TP)   8. Screening for AAA (abdominal aortic aneurysm)  Z13.6  Aorta Medicare AAA Screening   9. Palpitations  R00.2 TSH with free T4 reflex     TSH with free T4 reflex   10. Elevated prostate specific antigen (PSA)  R97.20 PSA, screen     PSA, screen   11.  Essential hypertension with goal blood pressure less than 140/90  I10 amLODIPine (NORVASC) 5 MG tablet     triamterene-HCTZ (MAXZIDE) 75-50 MG tablet       Counseling Resources:  ATP IV Guidelines  Pooled Cohorts Equation Calculator  Breast Cancer Risk Calculator  Breast Cancer: Medication to Reduce Risk  FRAX Risk Assessment  ICSI Preventive Guidelines  Dietary Guidelines for Americans, 2010  [place]'s MyPlate  ASA Prophylaxis  Lung CA Screening    Kevin Jackson MD  [facility]    Identified Health Risks:

## 2022-04-12 ENCOUNTER — ANCILLARY PROCEDURE (OUTPATIENT)
Dept: ULTRASOUND IMAGING | Facility: CLINIC | Age: 67
End: 2022-04-12
Attending: INTERNAL MEDICINE
Payer: MEDICARE

## 2022-04-12 DIAGNOSIS — Z13.6 SCREENING FOR AAA (ABDOMINAL AORTIC ANEURYSM): ICD-10-CM

## 2022-04-12 PROCEDURE — 76706 US ABDL AORTA SCREEN AAA: CPT | Performed by: RADIOLOGY

## 2022-04-25 ENCOUNTER — LAB (OUTPATIENT)
Dept: LAB | Facility: CLINIC | Age: 67
End: 2022-04-25
Payer: MEDICARE

## 2022-04-25 DIAGNOSIS — Z11.4 SCREENING FOR HIV (HUMAN IMMUNODEFICIENCY VIRUS): ICD-10-CM

## 2022-04-25 DIAGNOSIS — Z11.59 ENCOUNTER FOR SCREENING FOR OTHER VIRAL DISEASES: ICD-10-CM

## 2022-04-25 PROCEDURE — U0005 INFEC AGEN DETEC AMPLI PROBE: HCPCS

## 2022-04-25 PROCEDURE — U0003 INFECTIOUS AGENT DETECTION BY NUCLEIC ACID (DNA OR RNA); SEVERE ACUTE RESPIRATORY SYNDROME CORONAVIRUS 2 (SARS-COV-2) (CORONAVIRUS DISEASE [COVID-19]), AMPLIFIED PROBE TECHNIQUE, MAKING USE OF HIGH THROUGHPUT TECHNOLOGIES AS DESCRIBED BY CMS-2020-01-R: HCPCS

## 2022-04-26 LAB — SARS-COV-2 RNA RESP QL NAA+PROBE: NEGATIVE

## 2022-04-28 ENCOUNTER — HOSPITAL ENCOUNTER (OUTPATIENT)
Facility: AMBULATORY SURGERY CENTER | Age: 67
Discharge: HOME | End: 2022-04-28
Attending: SURGERY | Admitting: SURGERY
Payer: MEDICARE

## 2022-04-28 VITALS
TEMPERATURE: 98.6 F | HEART RATE: 84 BPM | SYSTOLIC BLOOD PRESSURE: 130 MMHG | DIASTOLIC BLOOD PRESSURE: 81 MMHG | RESPIRATION RATE: 16 BRPM | OXYGEN SATURATION: 96 %

## 2022-04-28 LAB — COLONOSCOPY: NORMAL

## 2022-04-28 PROCEDURE — 88305 TISSUE EXAM BY PATHOLOGIST: CPT | Performed by: PATHOLOGY

## 2022-04-28 PROCEDURE — 45381 COLONOSCOPY SUBMUCOUS NJX: CPT | Mod: PT | Performed by: SURGERY

## 2022-04-28 PROCEDURE — G8907 PT DOC NO EVENTS ON DISCHARG: HCPCS

## 2022-04-28 PROCEDURE — G8918 PT W/O PREOP ORDER IV AB PRO: HCPCS

## 2022-04-28 PROCEDURE — G0500 MOD SEDAT ENDO SERVICE >5YRS: HCPCS | Performed by: SURGERY

## 2022-04-28 PROCEDURE — 45385 COLONOSCOPY W/LESION REMOVAL: CPT | Mod: PT | Performed by: SURGERY

## 2022-04-28 PROCEDURE — 45381 COLONOSCOPY SUBMUCOUS NJX: CPT

## 2022-04-28 PROCEDURE — 45385 COLONOSCOPY W/LESION REMOVAL: CPT

## 2022-04-28 DEVICE — IMPLANTABLE DEVICE: Type: IMPLANTABLE DEVICE | Status: FUNCTIONAL

## 2022-04-28 RX ORDER — FENTANYL CITRATE 50 UG/ML
INJECTION, SOLUTION INTRAMUSCULAR; INTRAVENOUS PRN
Status: DISCONTINUED | OUTPATIENT
Start: 2022-04-28 | End: 2022-04-28 | Stop reason: HOSPADM

## 2022-04-28 RX ORDER — LIDOCAINE 40 MG/G
CREAM TOPICAL
Status: DISCONTINUED | OUTPATIENT
Start: 2022-04-28 | End: 2022-04-29 | Stop reason: HOSPADM

## 2022-04-28 RX ORDER — PROCHLORPERAZINE MALEATE 5 MG
5 TABLET ORAL EVERY 6 HOURS PRN
Status: DISCONTINUED | OUTPATIENT
Start: 2022-04-28 | End: 2022-04-29 | Stop reason: HOSPADM

## 2022-04-28 RX ORDER — NALOXONE HYDROCHLORIDE 0.4 MG/ML
0.2 INJECTION, SOLUTION INTRAMUSCULAR; INTRAVENOUS; SUBCUTANEOUS
Status: DISCONTINUED | OUTPATIENT
Start: 2022-04-28 | End: 2022-04-29 | Stop reason: HOSPADM

## 2022-04-28 RX ORDER — ONDANSETRON 4 MG/1
4 TABLET, ORALLY DISINTEGRATING ORAL EVERY 6 HOURS PRN
Status: DISCONTINUED | OUTPATIENT
Start: 2022-04-28 | End: 2022-04-29 | Stop reason: HOSPADM

## 2022-04-28 RX ORDER — FLUMAZENIL 0.1 MG/ML
0.2 INJECTION, SOLUTION INTRAVENOUS
Status: ACTIVE | OUTPATIENT
Start: 2022-04-28 | End: 2022-04-28

## 2022-04-28 RX ORDER — NALOXONE HYDROCHLORIDE 0.4 MG/ML
0.4 INJECTION, SOLUTION INTRAMUSCULAR; INTRAVENOUS; SUBCUTANEOUS
Status: DISCONTINUED | OUTPATIENT
Start: 2022-04-28 | End: 2022-04-29 | Stop reason: HOSPADM

## 2022-04-28 RX ORDER — ONDANSETRON 2 MG/ML
4 INJECTION INTRAMUSCULAR; INTRAVENOUS EVERY 6 HOURS PRN
Status: DISCONTINUED | OUTPATIENT
Start: 2022-04-28 | End: 2022-04-29 | Stop reason: HOSPADM

## 2022-04-28 NOTE — LETTER
May 3, 2022      Jose Manuel Sheth  [address]  [facility] [place] [address]        Dear ,    We are writing to inform you of your test results.               [facility]           [address]           [place] [address]           Tel [phone]  Mayur Sheth  [address]  [facility] [address]      May 3, 2022    Dear Mayur,  This letter is to inform you of the results of your pathology report on your colonoscopy.  If you have questions please feel free to call my assistant  At [phone] .    Your pathology report was:    Showed an Adenomatous polyp. This is a benign (not cancerous) growth; however these can become cancer over time. This polyp is usually removed completely at the time of the biopsy. Because it is an Adenomatous polyp you do have a slight higher risk for colon cancer. This is why you will need a repeat colonoscopy in approximately 1 year.  If you do have further questions please don t hesitate to call the below number.    To make an appointment call [phone]: .   Sincerely,     Nathan Gaines M.D.  ___                    Resulted Orders   Surgical Pathology Exam   Result Value Ref Range    Case Report       Surgical Pathology Report                         Case: [id number]                                  Authorizing Provider:  Nathan Gaines MD Collected:           04/28/2022 09:17 AM          Ordering Location:     [facility]    Received:            04/28/2022 03:55 PM                                 [facility]                                                                     Pathologist:           Nohemi Morillo MD                                                           Specimens:   A) - Large Intestine, Colon, Cecum/Ascending, cold snare polyps x 4;clip x1                         B) - Large Intestine, Colon, Ascending, polyp x1                                                    C) - Large  "Intestine, Colon, Hepatic Flexure, hepatic flexure polyp                                 D) - Large Intestine, Colon, Transverse, proximal transverse polyps x2                              E) - Large Intestine, Colon, polyps x2 at 40cm                                             Final Diagnosis       A. CECUM/ASCENDING COLON, POLYPS X4, BIOPSY:  Tubular adenoma(s) with no high grade dysplasia in multiple fragments     B. ASCENDING COLON, POLYP, POLYPECTOMY:  Tubular adenoma; negative for high grade dysplasia:   -Completely excised with uninvolved inked/cauterized deep or peripheral margins    C. COLON, HEPATIC FLEXURE, POLYP, BIOPSY:  Tubular adenoma; negative for high grade dysplasia    D. PROXIMAL TRANSVERSE COLON, POLYPS X2, BIOPSY:  Tubular adenoma(s) with no high grade dysplasia in fragments      E. COLON, POLYPS X2, BIOPSY AT 40 CM:   Tubular adenomas with no high grade dysplasia           Clinical Information       Procedure:  COLONOSCOPY, WITH CO2 INSUFFLATION  COLONOSCOPY, FLEXIBLE, WITH LESION REMOVAL USING SNARE  TATTOOING, WITH COLONOSCOPY  COLONOSCOPY, WITH HEMORRHAGE CONTROL  Pre-op Diagnosis: History of colonic polyps [Z86.010]  Post-op Diagnosis: Z86.010 - History of colonic polyps [ICD-10-CM]      Gross Description       A(1). Large Intestine, Colon, Cecum/Ascending, cold snare polyps x 4;clip x1:  The specimen is received in formalin with proper patient identification, labeled \"cecum ascending colon polyps x4\".  The specimen consists of multiple segments of tan soft tissue ranging from 0.1 to 1.0 cm in greatest dimension.  It is wrapped and entirely submitted in cassettes A1-A2.     B(2). Large Intestine, Colon, Ascending, polyp x1:  The specimen is received in formalin with proper patient identification, labeled \"ascending colon polyp x1\".  The specimen consists of 1 tan soft polyp measuring 1.6 cm in greatest dimension, and 7 additional segments of tan soft tissue ranging from 0.2 to 0.6 cm.  The " "margin is inked black and it is sectioned.  The specimen is entirely submitted.    B1: Polyp  B2: Additional segments of tissue     C(3). Large Intestine, Colon, Hepatic Flexure, hepatic flexure polyp :  The specimen is received in formalin with proper patient identification, labeled \"hepatic flexure polyp\".  The specimen consists of multiple segments of tan soft tissue ranging from 0.2 to 0.7 cm in greatest dimension.  It is entirely submitted in cassette C1.     D(4). Large Intestine, Colon, Transverse, proximal transverse polyps x2:  The specimen is received in formalin with proper patient identification, labeled \"proximal transverse polyps x2\".  The specimen consists of 7 segments of tan soft tissue ranging from 0.2 to 1.3 cm in length.  It is entirely submitted in cassette D1.     E(5). Large Intestine, Colon, polyps x2 at 40cm :  The specimen is received in formalin with proper patient identification, labeled \"polyps x2 at 40 cm\".  The specimen consists of 7 segments of tan soft tissue ranging from 0.3 to 1.3 cm in length.  It is entirely submitted in cassette E1.       Microscopic Description       Microscopic examination has been performed.        Performing Labs       The technical component of this testing was completed at [facility]      Case Images         If you have any questions or concerns, please call the clinic at the number listed above.       Sincerely,      Nathan Gaines MD          "

## 2022-04-28 NOTE — H&P
ENDOSCOPY PRE-SEDATION H&P FOR OUTPATIENT PROCEDURES    Mayur Sheth  [id number]  1955    Procedure:   Colonoscopy possible biopsy, possible polypectomy, with moderate sedation.     Pre-procedure diagnosis: history of polyps    Past medical history:   Past Medical History:   Diagnosis Date     AC (acromioclavicular) joint arthritis 12/1/2016     Bee sting allergies      Complete tear of left rotator cuff 12/1/2016     Diverticulosis      Essential hypertension, benign 2006     GERD (gastroesophageal reflux disease)      Hyperlipidemia LDL goal <100     Fam Hx CAD     Medial meniscus tear     L knee     Obesity, Class II, BMI 35-39.9, with comorbidity 2/14/2013     Rotator cuff arthropathy, left      S/P rotator cuff repair 12/14/16    left     Tubular adenoma 3/24/08       Past surgical history:   Past Surgical History:   Procedure Laterality Date     ARTHROSCOPIC RECONSTRUCTION ANTERIOR CRUCIATE LIGAMENT Left 6/12/2020    Procedure: Left Revision ACL RECONSTRUCTION with allograft, and partial medial meniscectomy;  Surgeon: Donell Brower MD;  Location: [facility] OR     ARTHROSCOPY SHLDR ROTATOR CUFF REPAIR, SUBACROMIAL DECOMP, DIST CLAVICLE RESECTION, BICEP TENODESIS Left 12/14/2016    Procedure: ARTHROSCOPY SHOULDER ROTATOR CUFF REPAIR, SUBACROMIAL DECOMPRESSION, DISTAL CLAVICLE RESECTION, OPEN BICEP TENODESIS REPAIR;  Surgeon: Donell Brower MD;  Location: [facility] OR     TONSILLECTOMY & ADENOIDECTOMY       ZZC REPAIR CRUCIATE LIGAMENT,KNEE  ~1992    LT ACL       Current Outpatient Medications   Medication     amLODIPine (NORVASC) 5 MG tablet     diphenhydrAMINE (BENADRYL) 25 MG capsule     pravastatin (PRAVACHOL) 80 MG tablet     triamterene-HCTZ (MAXZIDE) 75-50 MG tablet     varenicline (CHANTIX) 1 MG tablet     Probiotic Product (PROBIOTIC DAILY PO)     Current Facility-Administered Medications   Medication     lidocaine (LMX4) kit     lidocaine 1 % 0.1-1 mL     sodium chloride (PF) 0.9% PF flush 3  mL     sodium chloride (PF) 0.9% PF flush 3 mL       Allergies   Allergen Reactions     Morphine Anaphylaxis     No Clinical Screening - See Comments Anaphylaxis     Detergents,        Bee        History of Anesthesia/Sedation Problems: no    Physical Exam:    Mental status: alert  Heart: Normal  Lung: Normal  Assessment of patient's airway: Normal  Other as pertinent for procedure: None     ASA Score: See Provation note    Mallampati score:  II - Faucial pillars and soft palate may be seen, but uvula is masked by the base of the tongue    Assessment/Plan:     The patient is an appropriate candidate to receive sedation.    Informed consent was discussed with the patient/family, including the risks, benefits, potential complications and any alternative options associated with sedation.    Patient assessment completed just prior to sedation and while under constant observation by the provider. Condition determined to be adequate for proceeding with sedation.    The specific risks for the procedure were discussed with the patient at the time of informed consent and include but are not limited to perforation which could require surgery, missing significant neoplasm or lesion, hemorrhage and adverse sedative complication.      Nathan Gaines MD

## 2022-05-02 LAB
PATH REPORT.COMMENTS IMP SPEC: NORMAL
PATH REPORT.COMMENTS IMP SPEC: NORMAL
PATH REPORT.FINAL DX SPEC: NORMAL
PATH REPORT.GROSS SPEC: NORMAL
PATH REPORT.MICROSCOPIC SPEC OTHER STN: NORMAL
PATH REPORT.RELEVANT HX SPEC: NORMAL
PHOTO IMAGE: NORMAL

## 2022-07-05 DIAGNOSIS — Z71.6 ENCOUNTER FOR SMOKING CESSATION COUNSELING: ICD-10-CM

## 2022-07-06 RX ORDER — VARENICLINE TARTRATE 1 MG/1
TABLET, FILM COATED ORAL
Qty: 62 TABLET | Refills: 0 | Status: SHIPPED | OUTPATIENT
Start: 2022-07-06 | End: 2022-09-12

## 2022-07-06 NOTE — TELEPHONE ENCOUNTER
"Requested Prescriptions   Signed Prescriptions Disp Refills    varenicline (CHANTIX) 1 MG tablet 62 tablet 0     Sig: Take 1 tablet (1 mg) by mouth 2 times daily       Partial Cholinergic Nicotinic Agonist Agents Passed - 7/5/2022  2:43 PM        Passed - Blood pressure under 140/90 in past 12 months     BP Readings from Last 3 Encounters:   04/28/22 130/81   04/07/22 134/72   03/01/21 138/74                 Passed - Recent (12 mo) or future (30 days) visit within the authorizing provider's specialty     Patient has had an office visit with the authorizing provider or a provider within the authorizing providers department within the previous 12 mos or has a future within next 30 days. See \"Patient Info\" tab in inbasket, or \"Choose Columns\" in Meds & Orders section of the refill encounter.              Passed - Medication is active on med list        Passed - Patient is 18 years of age or older           Harriet Daniels RN  [facility]     "

## 2022-09-11 DIAGNOSIS — Z71.6 ENCOUNTER FOR SMOKING CESSATION COUNSELING: ICD-10-CM

## 2022-09-12 RX ORDER — VARENICLINE TARTRATE 1 MG/1
TABLET, FILM COATED ORAL
Qty: 62 TABLET | Refills: 0 | Status: SHIPPED | OUTPATIENT
Start: 2022-09-12 | End: 2022-10-31

## 2022-10-16 ENCOUNTER — HEALTH MAINTENANCE LETTER (OUTPATIENT)
Age: 67
End: 2022-10-16

## 2022-10-28 DIAGNOSIS — Z71.6 ENCOUNTER FOR SMOKING CESSATION COUNSELING: ICD-10-CM

## 2022-10-31 RX ORDER — VARENICLINE TARTRATE 1 MG/1
TABLET, FILM COATED ORAL
Qty: 62 TABLET | Refills: 0 | Status: SHIPPED | OUTPATIENT
Start: 2022-10-31 | End: 2023-05-09

## 2023-01-05 NOTE — PATIENT INSTRUCTIONS
Let's try:  Aleve (naproxen) either 440 mg over-the-counter twice daily, or 500 mg prescription twice daily for 2 weeks.    May take lorazepam 0.5 mg up to Every 6 hours as needed:  No driving.    Call and set up appointment with physical therapy ASAP.    Shailesh Welch MD  Internal Medicine and Pediatrics     
Clear bilaterally, pupils equal, round and reactive to light.
No

## 2023-04-05 DIAGNOSIS — I10 ESSENTIAL HYPERTENSION WITH GOAL BLOOD PRESSURE LESS THAN 140/90: ICD-10-CM

## 2023-04-06 RX ORDER — TRIAMTERENE AND HYDROCHLOROTHIAZIDE 75; 50 MG/1; MG/1
1 TABLET ORAL DAILY
Qty: 90 TABLET | Refills: 0 | Status: SHIPPED | OUTPATIENT
Start: 2023-04-06 | End: 2023-05-09

## 2023-04-10 DIAGNOSIS — E78.5 HYPERLIPIDEMIA LDL GOAL <100: ICD-10-CM

## 2023-04-10 RX ORDER — PRAVASTATIN SODIUM 80 MG/1
80 TABLET ORAL DAILY
Qty: 90 TABLET | Refills: 0 | Status: SHIPPED | OUTPATIENT
Start: 2023-04-10 | End: 2023-05-09

## 2023-05-09 ENCOUNTER — OFFICE VISIT (OUTPATIENT)
Dept: FAMILY MEDICINE | Facility: CLINIC | Age: 68
End: 2023-05-09
Payer: MEDICARE

## 2023-05-09 VITALS
WEIGHT: 205 LBS | RESPIRATION RATE: 18 BRPM | HEIGHT: 65 IN | OXYGEN SATURATION: 95 % | BODY MASS INDEX: 34.16 KG/M2 | DIASTOLIC BLOOD PRESSURE: 77 MMHG | SYSTOLIC BLOOD PRESSURE: 129 MMHG | TEMPERATURE: 98.8 F | HEART RATE: 85 BPM

## 2023-05-09 DIAGNOSIS — I10 ESSENTIAL HYPERTENSION WITH GOAL BLOOD PRESSURE LESS THAN 140/90: ICD-10-CM

## 2023-05-09 DIAGNOSIS — E66.01 MORBID OBESITY (H): ICD-10-CM

## 2023-05-09 DIAGNOSIS — Z12.11 SCREEN FOR COLON CANCER: Primary | ICD-10-CM

## 2023-05-09 DIAGNOSIS — Z71.6 ENCOUNTER FOR SMOKING CESSATION COUNSELING: ICD-10-CM

## 2023-05-09 DIAGNOSIS — Z11.4 SCREENING FOR HIV (HUMAN IMMUNODEFICIENCY VIRUS): ICD-10-CM

## 2023-05-09 DIAGNOSIS — Z23 NEED FOR SHINGLES VACCINE: ICD-10-CM

## 2023-05-09 DIAGNOSIS — E78.5 HYPERLIPIDEMIA LDL GOAL <100: ICD-10-CM

## 2023-05-09 LAB
ALBUMIN SERPL BCG-MCNC: 4.5 G/DL (ref 3.5–5.2)
ALP SERPL-CCNC: 90 U/L (ref 40–129)
ALT SERPL W P-5'-P-CCNC: 34 U/L (ref 10–50)
ANION GAP SERPL CALCULATED.3IONS-SCNC: 12 MMOL/L (ref 7–15)
AST SERPL W P-5'-P-CCNC: 30 U/L (ref 10–50)
BILIRUB SERPL-MCNC: 0.5 MG/DL
BUN SERPL-MCNC: 14 MG/DL (ref 8–23)
CALCIUM SERPL-MCNC: 9.6 MG/DL (ref 8.8–10.2)
CHLORIDE SERPL-SCNC: 98 MMOL/L (ref 98–107)
CHOLEST SERPL-MCNC: 142 MG/DL
CREAT SERPL-MCNC: 1.02 MG/DL (ref 0.67–1.17)
DEPRECATED HCO3 PLAS-SCNC: 30 MMOL/L (ref 22–29)
GFR SERPL CREATININE-BSD FRML MDRD: 81 ML/MIN/1.73M2
GLUCOSE SERPL-MCNC: 124 MG/DL (ref 70–99)
HDLC SERPL-MCNC: 40 MG/DL
HIV 1+2 AB+HIV1 P24 AG SERPL QL IA: NONREACTIVE
LDLC SERPL CALC-MCNC: 62 MG/DL
NONHDLC SERPL-MCNC: 102 MG/DL
POTASSIUM SERPL-SCNC: 3.8 MMOL/L (ref 3.4–5.3)
PROT SERPL-MCNC: 7 G/DL (ref 6.4–8.3)
SODIUM SERPL-SCNC: 140 MMOL/L (ref 136–145)
TRIGL SERPL-MCNC: 202 MG/DL

## 2023-05-09 PROCEDURE — 80061 LIPID PANEL: CPT | Performed by: INTERNAL MEDICINE

## 2023-05-09 PROCEDURE — G0438 PPPS, INITIAL VISIT: HCPCS | Performed by: INTERNAL MEDICINE

## 2023-05-09 PROCEDURE — 80053 COMPREHEN METABOLIC PANEL: CPT | Performed by: INTERNAL MEDICINE

## 2023-05-09 PROCEDURE — 87389 HIV-1 AG W/HIV-1&-2 AB AG IA: CPT | Performed by: INTERNAL MEDICINE

## 2023-05-09 PROCEDURE — 36415 COLL VENOUS BLD VENIPUNCTURE: CPT | Performed by: INTERNAL MEDICINE

## 2023-05-09 RX ORDER — PRAVASTATIN SODIUM 80 MG/1
80 TABLET ORAL DAILY
Qty: 90 TABLET | Refills: 4 | Status: SHIPPED | OUTPATIENT
Start: 2023-05-09 | End: 2024-05-13

## 2023-05-09 RX ORDER — AMLODIPINE BESYLATE 5 MG/1
5 TABLET ORAL DAILY
Qty: 90 TABLET | Refills: 3 | Status: SHIPPED | OUTPATIENT
Start: 2023-05-09 | End: 2024-05-13

## 2023-05-09 RX ORDER — VARENICLINE TARTRATE 1 MG/1
1 TABLET, FILM COATED ORAL 2 TIMES DAILY
Qty: 62 TABLET | Refills: 4 | Status: SHIPPED | OUTPATIENT
Start: 2023-05-09 | End: 2024-05-13

## 2023-05-09 RX ORDER — TRIAMTERENE AND HYDROCHLOROTHIAZIDE 75; 50 MG/1; MG/1
1 TABLET ORAL DAILY
Qty: 90 TABLET | Refills: 4 | Status: SHIPPED | OUTPATIENT
Start: 2023-05-09 | End: 2024-05-13

## 2023-05-09 ASSESSMENT — ENCOUNTER SYMPTOMS
PARESTHESIAS: 1
HEMATURIA: 0
DYSURIA: 0
DIZZINESS: 0
SHORTNESS OF BREATH: 0
NERVOUS/ANXIOUS: 0
DIARRHEA: 0
HEARTBURN: 0
FREQUENCY: 1
MYALGIAS: 0
SORE THROAT: 0
FEVER: 0
COUGH: 0
WEAKNESS: 0
ARTHRALGIAS: 1
PALPITATIONS: 0
HEMATOCHEZIA: 0
HEADACHES: 0
CONSTIPATION: 0
ABDOMINAL PAIN: 0
JOINT SWELLING: 0
NAUSEA: 0
EYE PAIN: 0
CHILLS: 0

## 2023-05-09 ASSESSMENT — ACTIVITIES OF DAILY LIVING (ADL): CURRENT_FUNCTION: NO ASSISTANCE NEEDED

## 2023-05-09 NOTE — PROGRESS NOTES
"SUBJECTIVE:   Jose Manuel is a 68 year old who presents for Preventive Visit.      5/9/2023     7:08 AM   Additional Questions   Roomed by Asuncion       Are you in the first 12 months of your Medicare coverage?  No    Healthy Habits:     In general, how would you rate your overall health?  Fair    Frequency of exercise:  1 day/week    Duration of exercise:  Less than 15 minutes    Do you usually eat at least 4 servings of fruit and vegetables a day, include whole grains    & fiber and avoid regularly eating high fat or \"junk\" foods?  Yes    Taking medications regularly:  Yes    Medication side effects:  Not applicable, None, No muscle aches and No significant flushing    Ability to successfully perform activities of daily living:  No assistance needed    Home Safety:  No safety concerns identified    Hearing Impairment:  No hearing concerns    In the past 6 months, have you been bothered by leaking of urine?  No    In general, how would you rate your overall mental or emotional health?  Good      PHQ-2 Total Score: 0    Additional concerns today:  No  hereditary angioedema      Have you ever done Advance Care Planning? (For example, a Health Directive, POLST, or a discussion with a medical provider or your loved ones about your wishes): No, advance care planning information given to patient to review.  Patient plans to discuss their wishes with loved ones or provider.         Fall risk  Fallen 2 or more times in the past year?: Yes  Any fall with injury in the past year?: No    Cognitive Screening   1) Repeat 3 items (Leader, Season, Table)    2) Clock draw: NORMAL  3) 3 item recall: Recalls 3 objects  Results: 3 items recalled: COGNITIVE IMPAIRMENT LESS LIKELY    Mini-CogTM Copyright DONNA Ko. Licensed by the author for use in [facility]; reprinted with permission ([email or web address]@.[email or web address]). All rights reserved.      Do you have sleep apnea, excessive snoring or daytime drowsiness?: no    Reviewed and updated as needed this " visit by clinical staff   Tobacco  Allergies               Reviewed and updated as needed this visit by Provider                 Social History     Tobacco Use     Smoking status: Former     Packs/day: 1.00     Years: 45.00     Pack years: 45.00     Types: Cigarettes     Quit date: 3/7/2022     Years since quittin.1     Passive exposure: Past     Smokeless tobacco: Never     Tobacco comments:     requit, uses Chantix successfully   Vaping Use     Vaping status: Never Used   Substance Use Topics     Alcohol use: Yes     Alcohol/week: 6.0 standard drinks of alcohol     Comment: 5 beers per month              2023     6:59 AM   Alcohol Use   Prescreen: >3 drinks/day or >7 drinks/week? No     Do you have a current opioid prescription? No  Do you use any other controlled substances or medications that are not prescribed by a provider? None          Current providers sharing in care for this patient include:   Patient Care Team:  Kevin Jackson MD as PCP - General (Internal Medicine - Pediatrics)  Kevin Jackson MD as Assigned PCP    The following health maintenance items are reviewed in Epic and correct as of today:  Health Maintenance   Topic Date Due     URINE DRUG SCREEN  Never done     Pneumococcal Vaccine: 65+ Years (2 - PCV) 05/10/2017     ZOSTER IMMUNIZATION (2 of 3) 2018     MEDICARE ANNUAL WELLNESS VISIT  2023     LUNG CANCER SCREENING  2023     COLORECTAL CANCER SCREENING  2023     ANNUAL REVIEW OF HM ORDERS  2024     FALL RISK ASSESSMENT  2024     LIPID  2028     ADVANCE CARE PLANNING  2028     DTAP/TDAP/TD IMMUNIZATION (3 - Td or Tdap) 2028     HEPATITIS C SCREENING  Completed     PHQ-2 (once per calendar year)  Completed     INFLUENZA VACCINE  Completed     AORTIC ANEURYSM SCREENING (SYSTEM ASSIGNED)  Completed     COVID-19 Vaccine  Completed     IPV IMMUNIZATION  Aged Out     MENINGITIS IMMUNIZATION  Aged Out     Lab work is in  process  Labs reviewed in EPIC  BP Readings from Last 3 Encounters:   05/09/23 129/77   04/28/22 130/81   04/07/22 134/72    Wt Readings from Last 3 Encounters:   05/09/23 93 kg (205 lb)   04/07/22 95.7 kg (211 lb)   03/01/21 93 kg (205 lb)                  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     Other tear of medial meniscus of left knee, unspecified whether old or current tear, subsequent encounter     Chronic pain of left knee     Aftercare following surgery of the musculoskeletal system     Past Surgical History:   Procedure Laterality Date     ARTHROSCOPIC RECONSTRUCTION ANTERIOR CRUCIATE LIGAMENT Left 6/12/2020    Procedure: Left Revision ACL RECONSTRUCTION with allograft, and partial medial meniscectomy;  Surgeon: Donell Brower MD;  Location: [facility] OR     ARTHROSCOPY SHLDR ROTATOR CUFF REPAIR, SUBACROMIAL DECOMP, DIST CLAVICLE RESECTION, BICEP TENODESIS Left 12/14/2016    Procedure: ARTHROSCOPY SHOULDER ROTATOR CUFF REPAIR, SUBACROMIAL DECOMPRESSION, DISTAL CLAVICLE RESECTION, OPEN BICEP TENODESIS REPAIR;  Surgeon: Donell Brower MD;  Location: [facility] OR     COLONOSCOPY N/A 4/28/2022    Procedure: COLONOSCOPY, FLEXIBLE, WITH LESION REMOVAL USING SNARE;  Surgeon: Nathan Gaines MD;  Location: [facility] OR     COLONOSCOPY WITH CO2 INSUFFLATION N/A 4/28/2022    Procedure: COLONOSCOPY, WITH CO2 INSUFFLATION;  Surgeon: Nathan Gaines MD;  Location: [facility] OR     TONSILLECTOMY & ADENOIDECTOMY       [facility]  REPAIR CRUCIATE LIGAMENT,KNEE  ~    LT ACL       Social History     Tobacco Use     Smoking status: Former     Packs/day: 1.00     Years: 45.00     Pack years: 45.00     Types: Cigarettes     Quit date: 3/7/2022     Years since quittin.1     Passive exposure: Past     Smokeless tobacco: Never     Tobacco comments:     requit, uses Chantix successfully   Vaping Use     Vaping status: Never Used   Substance Use Topics     Alcohol use: Yes     Alcohol/week: 6.0 standard drinks of alcohol     Comment: 5 beers per month      Family History   Problem Relation Age of Onset     Hypertension Mother      Diabetes Mother      Heart Disease Mother         AFib     Myocardial Infarction Mother      Cerebrovascular Disease Mother      Heart Failure Mother              Hypertension Father      Myocardial Infarction Father 62             Diabetes Maternal Grandmother      Cancer - colorectal Paternal Grandmother      Diabetes Paternal Grandmother      Cerebrovascular Disease Paternal Grandmother      Myocardial Infarction Brother 43        MI, multiple     Psoriasis Sister      Thyroid Disease No family hx of      Glaucoma No family hx of      Macular Degeneration No family hx of      Anesthesia Reaction No family hx of      Thrombophilia No family hx of      Bleeding Disorder No family hx of          Current Outpatient Medications   Medication Sig Dispense Refill     amLODIPine (NORVASC) 5 MG tablet Take 1 tablet (5 mg) by mouth daily 90 tablet 3     diphenhydrAMINE (BENADRYL) 25 MG capsule Take 25 mg by mouth every 6 hours as needed for itching or allergies       pravastatin (PRAVACHOL) 80 MG tablet Take 1 tablet (80 mg) by mouth daily 90 tablet 4     triamterene-HCTZ (MAXZIDE) 75-50 MG tablet Take 1 tablet by mouth daily 90 tablet 4     varenicline (CHANTIX) 1 MG tablet Take 1 tablet (1 mg) by mouth 2 times daily 62 tablet 4     Probiotic Product (PROBIOTIC DAILY PO) Take 0.5 chew tab by mouth daily (Patient not  "taking: Reported on 5/9/2023)       Allergies   Allergen Reactions     Morphine Anaphylaxis     No Clinical Screening - See Comments Anaphylaxis     Detergents,        Bee              Review of Systems   Constitutional: Negative for chills and fever.   HENT: Positive for congestion. Negative for ear pain, hearing loss and sore throat.    Eyes: Negative for pain and visual disturbance.   Respiratory: Negative for cough and shortness of breath.    Cardiovascular: Negative for chest pain, palpitations and peripheral edema.   Gastrointestinal: Negative for abdominal pain, constipation, diarrhea, heartburn, hematochezia and nausea.   Genitourinary: Positive for frequency and impotence. Negative for dysuria, genital sores, hematuria, penile discharge and urgency.   Musculoskeletal: Positive for arthralgias. Negative for joint swelling and myalgias.   Skin: Negative for rash.   Neurological: Positive for paresthesias. Negative for dizziness, weakness and headaches.   Psychiatric/Behavioral: Negative for mood changes. The patient is not nervous/anxious.      Constitutional, HEENT, cardiovascular, pulmonary, gi and gu systems are negative, except as otherwise noted.    OBJECTIVE:   /77 (BP Location: Right arm, Patient Position: Chair, Cuff Size: Adult Large)   Pulse 85   Temp 98.8  F (37.1  C)   Resp 18   Ht 1.657 m (5' 5.25\")   Wt 93 kg (205 lb)   SpO2 95%   BMI 33.85 kg/m   Estimated body mass index is 33.85 kg/m  as calculated from the following:    Height as of this encounter: 1.657 m (5' 5.25\").    Weight as of this encounter: 93 kg (205 lb).  Physical Exam  GENERAL: healthy, alert and no distress  EYES: Eyes grossly normal to inspection, PERRL and conjunctivae and sclerae normal  HENT: ear canals and TM's normal, nose and mouth without ulcers or lesions  NECK: no adenopathy, no asymmetry, masses, or scars and thyroid normal to palpation  RESP: lungs clear to auscultation - no rales, rhonchi or " wheezes  CV: regular rate and rhythm, normal S1 S2, no S3 or S4, no murmur, click or rub, no peripheral edema and peripheral pulses strong  ABDOMEN: soft, nontender, no hepatosplenomegaly, no masses and bowel sounds normal  MS: no gross musculoskeletal defects noted, no edema  SKIN: no suspicious lesions or rashes  NEURO: Normal strength and tone, mentation intact and speech normal  BACK: no CVA tenderness, no paralumbar tenderness  PSYCH: mentation appears normal, affect normal/bright  LYMPH: no cervical, supraclavicular, axillary, or inguinal adenopathy    Diagnostic Test Results:  Labs reviewed in Epic  Results for orders placed or performed in visit on 05/09/23   HIV Antigen Antibody Combo     Status: Normal   Result Value Ref Range    HIV Antigen Antibody Combo Nonreactive Nonreactive   Comprehensive metabolic panel (BMP + Alb, Alk Phos, ALT, AST, Total. Bili, TP)     Status: Abnormal   Result Value Ref Range    Sodium 140 136 - 145 mmol/L    Potassium 3.8 3.4 - 5.3 mmol/L    Chloride 98 98 - 107 mmol/L    Carbon Dioxide (CO2) 30 (H) 22 - 29 mmol/L    Anion Gap 12 7 - 15 mmol/L    Urea Nitrogen 14.0 8.0 - 23.0 mg/dL    Creatinine 1.02 0.67 - 1.17 mg/dL    Calcium 9.6 8.8 - 10.2 mg/dL    Glucose 124 (H) 70 - 99 mg/dL    Alkaline Phosphatase 90 40 - 129 U/L    AST 30 10 - 50 U/L    ALT 34 10 - 50 U/L    Protein Total 7.0 6.4 - 8.3 g/dL    Albumin 4.5 3.5 - 5.2 g/dL    Bilirubin Total 0.5 <=1.2 mg/dL    GFR Estimate 81 >60 mL/min/1.73m2   Lipid panel reflex to direct LDL Fasting     Status: Abnormal   Result Value Ref Range    Cholesterol 142 <200 mg/dL    Triglycerides 202 (H) <150 mg/dL    Direct Measure HDL 40 >=40 mg/dL    LDL Cholesterol Calculated 62 <=100 mg/dL    Non HDL Cholesterol 102 <130 mg/dL    Narrative    Cholesterol  Desirable:  <200 mg/dL    Triglycerides  Normal:  Less than 150 mg/dL  Borderline High:  150-199 mg/dL  High:  200-499 mg/dL  Very High:  Greater than or equal to 500 mg/dL    Direct  Measure HDL  Female:  Greater than or equal to 50 mg/dL   Male:  Greater than or equal to 40 mg/dL    LDL Cholesterol  Desirable:  <100mg/dL  Above Desirable:  100-129 mg/dL   Borderline High:  130-159 mg/dL   High:  160-189 mg/dL   Very High:  >= 190 mg/dL    Non HDL Cholesterol  Desirable:  130 mg/dL  Above Desirable:  130-159 mg/dL  Borderline High:  160-189 mg/dL  High:  190-219 mg/dL  Very High:  Greater than or equal to 220 mg/dL       ASSESSMENT / PLAN:   Mayur was seen today for physical.    Diagnoses and all orders for this visit:    Screen for colon cancer  -     Colonoscopy Screening  Referral; Future    Need for shingles vaccine    Essential hypertension with goal blood pressure less than 140/90  -     Comprehensive metabolic panel (BMP + Alb, Alk Phos, ALT, AST, Total. Bili, TP); Future  -     amLODIPine (NORVASC) 5 MG tablet; Take 1 tablet (5 mg) by mouth daily  -     triamterene-HCTZ (MAXZIDE) 75-50 MG tablet; Take 1 tablet by mouth daily  -     Comprehensive metabolic panel (BMP + Alb, Alk Phos, ALT, AST, Total. Bili, TP)    Hyperlipidemia LDL goal <100  -     Lipid panel reflex to direct LDL Fasting; Future  -     pravastatin (PRAVACHOL) 80 MG tablet; Take 1 tablet (80 mg) by mouth daily  -     Lipid panel reflex to direct LDL Fasting    Encounter for smoking cessation counseling  -     varenicline (CHANTIX) 1 MG tablet; Take 1 tablet (1 mg) by mouth 2 times daily    Morbid obesity (H)    Screening for HIV (human immunodeficiency virus)  -     HIV Antigen Antibody Combo    Other orders  -     REVIEW OF HEALTH MAINTENANCE PROTOCOL ORDERS              COUNSELING:  Reviewed preventive health counseling, as reflected in patient instructions       Regular exercise       Healthy diet/nutrition       Vision screening       Hearing screening       Dental care       Colon cancer screening      BMI:   Estimated body mass index is 33.85 kg/m  as calculated from the following:    Height as of this  "encounter: 1.657 m (5' 5.25\").    Weight as of this encounter: 93 kg (205 lb).   Weight management plan: Discussed healthy diet and exercise guidelines      He reports that he quit smoking about 14 months ago. His smoking use included cigarettes. He has a 45.00 pack-year smoking history. He has been exposed to tobacco smoke. He has never used smokeless tobacco.      Appropriate preventive services were discussed with this patient, including applicable screening as appropriate for cardiovascular disease, diabetes, osteopenia/osteoporosis, and glaucoma.  As appropriate for age/gender, discussed screening for colorectal cancer, prostate cancer, breast cancer, and cervical cancer. Checklist reviewing preventive services available has been given to the patient.    Reviewed patients plan of care and provided an AVS. The Basic Care Plan (routine screening as documented in Health Maintenance) for Mayur meets the Care Plan requirement. This Care Plan has been established and reviewed with the Patient.          Kevin Jackson MD  [facility]    Identified Health Risks:    "

## 2023-05-22 ENCOUNTER — TELEPHONE (OUTPATIENT)
Dept: GASTROENTEROLOGY | Facility: CLINIC | Age: 68
End: 2023-05-22
Payer: MEDICARE

## 2023-05-22 NOTE — TELEPHONE ENCOUNTER
Screening Questions  B LUE  KIND OF PREP RED  LOCATION [review exclusion criteria] GREEN  SEDATION TYPE       NO Are you active on mychart?       JAYDA Ordering/Referring Provider?        MEDICARE/[employer] What type of coverage do you have?      N Have you had a positive covid test in the last 14 days?     32.3 1. BMI  [BMI 40+ - review exclusion criteria& smart-phrase document]    Y  2. Are you able to give consent for your medical care? [IF NO,RN REVIEW]          N  3. Are you taking any prescription pain medications on a routine schedule   (ex narcotics: oxycodone, roxicodone, oxycontin,  and percocet)? [RN Review]          3a. EXTENDED PREP What kind of prescription?     N 4. Do you have any chemical dependencies such as alcohol, street drugs, or methadone?        **If yes 3- 5 , please schedule with MAC sedation.**          IF YES TO ANY 6 - 10 - HOSPITAL SETTING ONLY.     N 6.   Do you need assistance transferring?     N 7.   Have you had a heart or lung transplant?    N 8.   Are you currently on dialysis?   N 9.   Do you use daily home oxygen?   N 10. Do you take nitroglycerin?   10a.  If yes, how often?      11. Are you currently pregnant?    11a.  If yes, how many weeks? [ Greater than 12 weeks, OR NEEDED]    N 12. Do you have Pulmonary Hypertension? *NEED PAC APPT AT [facility] w/ [facility]*     N 13. [review exclusion criteria]  Do you have any implantable devices in your body (pacemaker, defib, LVAD)?    N 14. In the past 6 months, have you had any heart related issues including cardiomyopathy or heart attack?     14a.  If yes, did it require cardiac stenting if so when?     N 15. Have you had a stroke or Transient ischemic attack (TIA - aka  mini stroke ) within 6 months?      N 16. Do you have mod to severe Obstructive Sleep Apnea?  [Hospital only]    N 17. Do you have SEVERE AND UNCONTROLLED asthma? *NEED PAC APPT AT [facility] w/[facility]*     18.Do you take blood thinners?  No    N 19. Do you take any of the following  "medications?    NPhentermine    NOzempic    NWegovy (Semaglutide)      19a. If yes, \"Hold for 7 days before procedure.  Please consult your prescribing provider if you have questions about holding this medication.\"     N  20. Do you have chronic kidney disease?      N  21. Do you have a diagnosis of diabetes?     N  22. On a regular basis do you go 3-5 days between bowel movements?      23. Preferred LOCAL Pharmacy for Pre Prescription      [facility] [address] - [address]    - CLOSING REMINDERS -    You will receive a call from a Nurse to review instructions and health history.  This assessment must be completed prior to your procedure.  Failure to complete the Nurse assessment may result in the procedure being cancelled.      On the day of your procedure, please designatean adult(s) who can drive you home stay with you for the next 24 hours. The medicines used in the exam will make you sleepy. You will not be able to drive.      You cannot take public transportation, ride share services, or non-medical taxi service without a responsible caregiver.  Medical transport services are allowed with the requirement that a responsible caregiver will receive you at your destination.  We require that drivers and caregivers are confirmed prior to your procedure.      - SCHEDULING DETAILS -  NO &  Hospital Setting Required & If yes, what is the exclusion?   OMI  Surgeon    7/19  Date of Procedure  Lower Endoscopy [Colonoscopy]  [facility] [place]   MIRALAX GATORADE WITHOUT MAGNEISUM CITRATE Which Colonoscopy Prep was Sent?     CS Sedation Type     N PAC / Pre-op Required     "

## 2023-07-10 ENCOUNTER — TELEPHONE (OUTPATIENT)
Dept: GASTROENTEROLOGY | Facility: CLINIC | Age: 68
End: 2023-07-10
Payer: MEDICARE

## 2023-07-10 NOTE — TELEPHONE ENCOUNTER
Pre assessment completed for upcoming procedure.   (Please see previous telephone encounter notes for complete details)    Patient  returned call.       Procedure details:    Arrival time and facility location reviewed    Pre op exam needed? N/A    Designated  policy reviewed. Instructed to have someone stay 6 hours post procedure.     COVID policy reviewed.      Medication review:    Medications reviewed. Please see supporting documentation below. Holding recommendations discussed (if applicable).       Prep for procedure:     Reviewed procedure prep instructions.       Additional information needed?  Patient received miralax prep instructions via letter WITH mg citrate. Patient to call if they are unable to find mg citrate for prep.     Patient does not view mychart.    Patient  verbalized understanding and had no questions or concerns at this time.      Hailee Hansen RN  Endoscopy Procedure Pre Assessment RN  [phone] option 4

## 2023-07-10 NOTE — TELEPHONE ENCOUNTER
Called the Pt to complete Pre-Assessment. First Attempt. No answer, Left message.     Pre visit planning completed.      Procedure details:    Patient scheduled for Colonoscopy  on 7/19/23.     Arrival time: 0645. Procedure time 0730    Pre op exam needed? N/A    Facility location: [facility]; [address], [place] [address]    Sedation type: Conscious sedation     Indication for procedure: Screening, previous polyps      Chart review:     Electronic implanted devices? No    Diabetic? Prediabetic        Medication review:    Anticoagulants? No    NSAIDS? No NSAID medications per patient's medication list.  RN will verify with pre-assessment call.    Other medication HOLDING recommendations:  N/A      Prep for procedure:     Bowel prep recommendation: Miralax prep without magnesium citrate    Prep instructions sent via [employer]       Hyacinth Ha RN  Endoscopy Procedure Pre Assessment RN  [phone] option 4

## 2023-07-19 ENCOUNTER — SURGERY (OUTPATIENT)
Age: 68
End: 2023-07-19
Payer: MEDICARE

## 2023-07-19 ENCOUNTER — HOSPITAL ENCOUNTER (OUTPATIENT)
Facility: AMBULATORY SURGERY CENTER | Age: 68
Discharge: HOME | End: 2023-07-19
Attending: STUDENT IN AN ORGANIZED HEALTH CARE EDUCATION/TRAINING PROGRAM | Admitting: STUDENT IN AN ORGANIZED HEALTH CARE EDUCATION/TRAINING PROGRAM
Payer: MEDICARE

## 2023-07-19 VITALS
HEART RATE: 83 BPM | SYSTOLIC BLOOD PRESSURE: 121 MMHG | TEMPERATURE: 97.1 F | RESPIRATION RATE: 16 BRPM | DIASTOLIC BLOOD PRESSURE: 81 MMHG | OXYGEN SATURATION: 96 %

## 2023-07-19 DIAGNOSIS — Z12.11 COLON CANCER SCREENING: Primary | ICD-10-CM

## 2023-07-19 LAB — COLONOSCOPY: NORMAL

## 2023-07-19 PROCEDURE — G8918 PT W/O PREOP ORDER IV AB PRO: HCPCS

## 2023-07-19 PROCEDURE — 45380 COLONOSCOPY AND BIOPSY: CPT | Mod: XS,PT

## 2023-07-19 PROCEDURE — 88305 TISSUE EXAM BY PATHOLOGIST: CPT | Mod: 59 | Performed by: PATHOLOGY

## 2023-07-19 PROCEDURE — G8907 PT DOC NO EVENTS ON DISCHARG: HCPCS

## 2023-07-19 PROCEDURE — 45385 COLONOSCOPY W/LESION REMOVAL: CPT | Mod: PT | Performed by: STUDENT IN AN ORGANIZED HEALTH CARE EDUCATION/TRAINING PROGRAM

## 2023-07-19 PROCEDURE — 45380 COLONOSCOPY AND BIOPSY: CPT | Mod: 59 | Performed by: STUDENT IN AN ORGANIZED HEALTH CARE EDUCATION/TRAINING PROGRAM

## 2023-07-19 PROCEDURE — 45385 COLONOSCOPY W/LESION REMOVAL: CPT | Mod: PT

## 2023-07-19 PROCEDURE — 99152 MOD SED SAME PHYS/QHP 5/>YRS: CPT | Mod: 59 | Performed by: STUDENT IN AN ORGANIZED HEALTH CARE EDUCATION/TRAINING PROGRAM

## 2023-07-19 RX ORDER — FENTANYL CITRATE 50 UG/ML
INJECTION, SOLUTION INTRAMUSCULAR; INTRAVENOUS PRN
Status: DISCONTINUED | OUTPATIENT
Start: 2023-07-19 | End: 2023-07-19 | Stop reason: HOSPADM

## 2023-07-19 RX ORDER — LIDOCAINE 40 MG/G
CREAM TOPICAL
Status: DISCONTINUED | OUTPATIENT
Start: 2023-07-19 | End: 2023-07-20 | Stop reason: HOSPADM

## 2023-07-19 RX ORDER — ONDANSETRON 2 MG/ML
4 INJECTION INTRAMUSCULAR; INTRAVENOUS
Status: DISCONTINUED | OUTPATIENT
Start: 2023-07-19 | End: 2023-07-20 | Stop reason: HOSPADM

## 2023-07-19 RX ADMIN — FENTANYL CITRATE 100 MCG: 50 INJECTION, SOLUTION INTRAMUSCULAR; INTRAVENOUS at 07:29

## 2023-07-19 NOTE — H&P
Pre-Endoscopy History and Physical     Mayur Sheth MRN# [id number]   YOB: 1955 Age: 68 year old     Date of Procedure: 7/19/2023  Primary care provider: Kevin Jackson  Type of Endoscopy: colonoscopy  Reason for Procedure: surveillance - prev tub adenomas and large adenoma >1cm  Type of Anesthesia Anticipated: Moderate Sedation    HPI:    Mayur is a 68 year old male who will be undergoing the above procedure.      A history and physical has been performed. The patient's medications and allergies have been reviewed. The risks and benefits of the procedure and the sedation options and risks were discussed with the patient.  I specifically discussed about possible sedation medication reaction, bleeding, and one of the more rare complications of perforation.  All questions were answered and informed consent was obtained.      He denies a personal or family history of anesthesia complications or bleeding disorders.     Allergies   Allergen Reactions     Morphine Anaphylaxis     No Clinical Screening - See Comments Anaphylaxis     Detergents,        Bee         Cannot display prior to admission medications because the patient has not been admitted in this contact.       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     Other tear of medial meniscus of left knee, unspecified whether old or current tear, subsequent encounter     Chronic pain of left knee     Aftercare following surgery of the musculoskeletal system         Past Medical History:   Diagnosis Date     AC (acromioclavicular) joint arthritis 2016     Bee sting allergies      Complete tear of left rotator cuff 2016     Diverticulosis      Essential hypertension, benign 2006     GERD (gastroesophageal reflux disease)      Hyperlipidemia LDL goal <100     Fam Hx CAD     Medial meniscus tear     L knee     Obesity, Class II, BMI 35-39.9, with comorbidity 2013     Rheumatic fever      Rotator cuff arthropathy, left      S/P rotator cuff repair 2016    left     Tubular adenoma 2008        Past Surgical History:   Procedure Laterality Date     ARTHROSCOPIC RECONSTRUCTION ANTERIOR CRUCIATE LIGAMENT Left 2020    Procedure: Left Revision ACL RECONSTRUCTION with allograft, and partial medial meniscectomy;  Surgeon: Donell Brower MD;  Location: [facility] OR     ARTHROSCOPY SHLDR ROTATOR CUFF REPAIR, SUBACROMIAL DECOMP, DIST CLAVICLE RESECTION, BICEP TENODESIS Left 2016    Procedure: ARTHROSCOPY SHOULDER ROTATOR CUFF REPAIR, SUBACROMIAL DECOMPRESSION, DISTAL CLAVICLE RESECTION, OPEN BICEP TENODESIS REPAIR;  Surgeon: Donell Brower MD;  Location: [facility] OR     COLONOSCOPY N/A 2022    Procedure: COLONOSCOPY, FLEXIBLE, WITH LESION REMOVAL USING SNARE;  Surgeon: Nathan Gaines MD;  Location: [facility]     COLONOSCOPY WITH CO2 INSUFFLATION N/A 2022    Procedure: COLONOSCOPY, WITH CO2 INSUFFLATION;  Surgeon: Nathan Gaines MD;  Location: [facility]     TONSILLECTOMY & ADENOIDECTOMY       ZZC REPAIR CRUCIATE LIGAMENT,KNEE  ~    LT ACL       Social History     Tobacco Use     Smoking status: Every Day     Packs/day: 1.00     Years: 45.00     Pack years: 45.00     Types: Cigarettes     Last attempt to quit: 3/7/2022     Years since quittin.3     Passive exposure: Past     Smokeless tobacco: Never     Tobacco comments:     requit, uses Chantix successfully   Substance Use Topics     Alcohol use: Yes     Alcohol/week:  "6.0 standard drinks of alcohol     Comment: 5 beers per month        Family History   Problem Relation Age of Onset     Hypertension Mother      Diabetes Mother      Heart Disease Mother         AFib     Myocardial Infarction Mother      Cerebrovascular Disease Mother      Heart Failure Mother              Hypertension Father      Myocardial Infarction Father 62             Diabetes Maternal Grandmother      Cancer - colorectal Paternal Grandmother      Diabetes Paternal Grandmother      Cerebrovascular Disease Paternal Grandmother      Myocardial Infarction Brother 43        MI, multiple     Psoriasis Sister      Thyroid Disease No family hx of      Glaucoma No family hx of      Macular Degeneration No family hx of      Anesthesia Reaction No family hx of      Thrombophilia No family hx of      Bleeding Disorder No family hx of        REVIEW OF SYSTEMS:     5 point ROS negative except as noted above in HPI, including Gen., Resp., CV, GI &  system review.      PHYSICAL EXAM:   /82   Temp 98.4  F (36.9  C) (Temporal)   Resp 16   SpO2 94%  Estimated body mass index is 33.85 kg/m  as calculated from the following:    Height as of 23: 1.657 m (5' 5.25\").    Weight as of 23: 93 kg (205 lb).   GENERAL APPEARANCE: healthy, alert and no distress  MENTAL STATUS: alert  AIRWAY EXAM: Mallampatti Class I (visualization of the soft palate, fauces, uvula, anterior and posterior pillars)  RESP: lungs clear to auscultation - no rales, rhonchi or wheezes  CV: regular rates and rhythm, normal S1 S2, no S3 or S4, no murmur, click or rub and no irregular beats      DIAGNOSTICS:    Not indicated      IMPRESSION   ASA Class 2 - Mild systemic disease        PLAN:       Plan for colonoscopy. We discussed the risks, benefits and alternatives and the patient wished to proceed.    The above has been forwarded to the consulting provider.      Signed Electronically by: PATY BRAGA MD  2023    "

## 2023-07-24 NOTE — RESULT ENCOUNTER NOTE
Mayur,    The type of polyps that were removed during your colonoscopy are tubular adenomas. These are not a cancer.  These are the type of polyps that sometimes will turn into one.  These polyps are out and will not cause you any further problems.  However, you should have another colonoscopy in 5 years to evaluate for other polyps.      Please let me know if you have any questions.       Thank you,    Kumar Martin MD

## 2023-12-04 ENCOUNTER — PATIENT OUTREACH (OUTPATIENT)
Dept: GASTROENTEROLOGY | Facility: CLINIC | Age: 68
End: 2023-12-04
Payer: MEDICARE

## 2024-04-09 ENCOUNTER — PATIENT OUTREACH (OUTPATIENT)
Dept: CARE COORDINATION | Facility: CLINIC | Age: 69
End: 2024-04-09
Payer: MEDICARE

## 2024-04-22 ENCOUNTER — OFFICE VISIT (OUTPATIENT)
Dept: FAMILY MEDICINE | Facility: CLINIC | Age: 69
End: 2024-04-22
Payer: MEDICARE

## 2024-04-22 ENCOUNTER — ANCILLARY PROCEDURE (OUTPATIENT)
Dept: GENERAL RADIOLOGY | Facility: CLINIC | Age: 69
End: 2024-04-22
Attending: PHYSICIAN ASSISTANT
Payer: MEDICARE

## 2024-04-22 VITALS
TEMPERATURE: 97.8 F | OXYGEN SATURATION: 96 % | HEART RATE: 94 BPM | RESPIRATION RATE: 16 BRPM | WEIGHT: 208.6 LBS | DIASTOLIC BLOOD PRESSURE: 70 MMHG | BODY MASS INDEX: 34.75 KG/M2 | SYSTOLIC BLOOD PRESSURE: 122 MMHG | HEIGHT: 65 IN

## 2024-04-22 DIAGNOSIS — M54.42 ACUTE LEFT-SIDED LOW BACK PAIN WITH LEFT-SIDED SCIATICA: Primary | ICD-10-CM

## 2024-04-22 DIAGNOSIS — M54.42 ACUTE LEFT-SIDED LOW BACK PAIN WITH LEFT-SIDED SCIATICA: ICD-10-CM

## 2024-04-22 PROCEDURE — 99213 OFFICE O/P EST LOW 20 MIN: CPT | Performed by: PHYSICIAN ASSISTANT

## 2024-04-22 PROCEDURE — 72100 X-RAY EXAM L-S SPINE 2/3 VWS: CPT | Mod: TC | Performed by: RADIOLOGY

## 2024-04-22 ASSESSMENT — PAIN SCALES - GENERAL: PAINLEVEL: EXTREME PAIN (8)

## 2024-04-22 NOTE — PATIENT INSTRUCTIONS
Rose Richard,    Thank you for allowing [facility] to manage your care.    I am unsure of the cause of your symptoms, but your story is most consistent with sciatica. We will see what our workup shows.     If you develop worsening/changing symptoms at any time, please be seen in clinic/urgent care or call 911/go to the emergency department for evaluation as we discussed.    I ordered some xrays. Please go to our radiology department to get your xrays.    I ordered an as needed MRI of your back IF YOU ARE NOT IMPROVING IN THE NEXT 3-4 weeks. Please call diagnostic imaging [phone] to schedule your test.    I made a referral to PT. They will be calling in approximately 1 week to set up your appointment.  If you do not hear from them, please call the specialty number on your after visit summary.     Please allow 1-2 business days for our office to contact you in regards to your laboratory/radiological studies.  If not done so, I encourage you to login into [employer] ([email or web address] to review your results as well.     For your pain, please use Tylenol 650mg every 6 hours. You may use 400mg of ibuprofen between doses of Tylenol.     Max acetaminophen (Tylenol) 3,000mg/24 hours  Max ibuprofen 2,000mg/24 hours    For severe pain not controlled by over the counter medications, please use cyclobenzaprine as prescribed. Do not use this medication while driving, operating machinery, with other sedating medications, or while drinking alcohol as it will make you drowsy.    If you have any questions or concerns, please feel free to call us at [phone]    Sincerely,    Derick Maddox PA-C    Did you know?      You can schedule a video visit for follow-up appointments as well as future appointments for certain conditions.  Please see the below link.     [email or web address]    If you have not already done so,  I encourage you to sign up for [employer]  ([email or web address]).  This will allow you to review your results, securely communicate with a provider, and schedule virtual visits as well.

## 2024-04-22 NOTE — PROGRESS NOTES
Assessment & Plan   Problem List Items Addressed This Visit          Nervous and Auditory    Low back pain - Primary    Relevant Orders    XR Lumbar Spine 2/3 Views (Completed)    Physical Therapy  Referral    MR Lumbar Spine w/o Contrast      I do not believe a fracture to be the source of this patient's pain as there was no preceding trauma. XR shows degenerative changes without acute abnormality.  I do not believe the pain is caused by an epidural abscess as the patient denies hx of IV drug use and does not have fevers/chills and no recent procedures.    I do not believe this patient's pain is from an infiltrative vertebral tumor as the patient does not have weight loss or night sweats and no known history of cancer.    I do not believe this patient's pain represents a rupture AAA.  There is no palpable, pulsatile mass on exam and patient does not have any ABD pain.      Based on history and exam, the most likely etiology of this patient's back pain is lumbosacral radiculopathy.  Emergent MRI is not indicated as this patient does not have new weakness or cauda equina syndrome.  Patient has no saddle anesthesia, bowel or bladder incontinence. Will send home with otc analgesics,  rice/heat, and physical therapy referral.  Follow-up in 1 month if not improving for MRI and re-evaluation.    Complete history and physical exam as below. Afebrile with normal vital signs.    DDx and Dx discussed with and explained to the pt to their satisfaction.  All questions were answered at this time. Pt expressed understanding of and agreement with this dx, tx, and plan. No further workup warranted and standard medication warnings given. I have given the patient a list of pertinent indications for re-evaluation. Will go to the Emergency Department if symptoms worsen or new concerning symptoms arise. Patient left in no apparent distress.     Ordering of each unique test     Nicotine/Tobacco Cessation  He reports that he  "has been smoking cigarettes. He started smoking about 47 years ago. He has a 45 pack-year smoking history. He has been exposed to tobacco smoke. He has never used smokeless tobacco.    BMI  Estimated body mass index is 34.45 kg/m  as calculated from the following:    Height as of this encounter: 1.657 m (5' 5.25\").    Weight as of this encounter: 94.6 kg (208 lb 9.6 oz).     See Patient Instructions      Quintin Richard is a 69 year old, presenting for the following health issues:  Pain        4/22/2024     2:19 PM   Additional Questions   Roomed by Gume Singh CMA   Accompanied by N/A         4/22/2024     2:19 PM   Patient Reported Additional Medications   Patient reports taking the following new medications No new medications     History of Present Illness       Back Pain:  He presents for follow up of back pain. Patient's back pain is a recurring problem.  Location of back pain:  Left lower back, left buttock and left side of waist  Description of back pain: burning  Back pain spreads: left buttocks, left thigh and left knee    Since patient first noticed back pain, pain is: unchanged  Does back pain interfere with his job:  Not applicable       He eats 2-3 servings of fruits and vegetables daily.He consumes 2 sweetened beverage(s) daily.He exercises with enough effort to increase his heart rate 10 to 19 minutes per day.  He exercises with enough effort to increase his heart rate 4 days per week.   He is taking medications regularly.     Patient states that he is experiencing burning pain going down his leg to his calf on the left side across the buttocks x 2 weeks.  He has had no recent falls or injury and sciatica exercises are not helping. No history of back surgery. No new numbness/tingling, incontinence, or weakness. Using ibu or naproxen have not been helpful.     Review of Systems  Constitutional, msk, cardiovascular, pulmonary, gi and gu systems are negative, except as otherwise noted.    " "  Objective    /70   Pulse 94   Temp 97.8  F (36.6  C) (Temporal)   Resp 16   Ht 1.657 m (5' 5.25\")   Wt 94.6 kg (208 lb 9.6 oz)   SpO2 96%   BMI 34.45 kg/m    Body mass index is 34.45 kg/m .  Physical Exam  Vitals and nursing note reviewed.   Constitutional:       General: He is not in acute distress.     Appearance: He is not ill-appearing or diaphoretic.   HENT:      Head: Normocephalic and atraumatic.      Mouth/Throat:      Mouth: Mucous membranes are moist.   Eyes:      Conjunctiva/sclera: Conjunctivae normal.   Cardiovascular:      Rate and Rhythm: Normal rate and regular rhythm.      Heart sounds: Normal heart sounds. No murmur heard.     No friction rub. No gallop.   Pulmonary:      Effort: Pulmonary effort is normal. No respiratory distress.      Breath sounds: Normal breath sounds. No stridor. No wheezing, rhonchi or rales.   Abdominal:      General: Bowel sounds are normal. There is no distension.      Palpations: Abdomen is soft. There is no mass.      Tenderness: There is no abdominal tenderness. There is no guarding or rebound.      Hernia: No hernia is present.   Musculoskeletal:      Comments: No CVA or midline spinal tenderness. No overlying signs of trauma or infection.   Skin:     General: Skin is warm and dry.   Neurological:      General: No focal deficit present.      Mental Status: He is alert. Mental status is at baseline.      Deep Tendon Reflexes:      Reflex Scores:       Patellar reflexes are 1+ on the right side and 1+ on the left side.     Comments: Able to toe lift, heel walk and knee bend.   Psychiatric:         Mood and Affect: Mood normal.         Behavior: Behavior normal.          Results for orders placed or performed in visit on 04/22/24   XR Lumbar Spine 2/3 Views     Status: None    Narrative    XR LUMBAR SPINE 2/3 VIEWS  4/22/2024 3:33 PM     HISTORY: Acute left-sided low back pain with left-sided sciatica    COMPARISON: None.       Impression    IMPRESSION: " Vertebral body heights are maintained. Extensive  multilevel loss of disc height. Mild levocurvature. Sacrum and  sacroiliac joints are unremarkable. Posterior element degenerative  changes in the inferior lumbar spine.     PONCE DEMARCO MD         SYSTEM ID:  [id number]           Signed Electronically by: GRISELDA Ash

## 2024-04-24 ENCOUNTER — THERAPY VISIT (OUTPATIENT)
Dept: PHYSICAL THERAPY | Facility: CLINIC | Age: 69
End: 2024-04-24
Attending: PHYSICIAN ASSISTANT
Payer: MEDICARE

## 2024-04-24 DIAGNOSIS — M54.16 LUMBAR RADICULOPATHY: Primary | ICD-10-CM

## 2024-04-24 DIAGNOSIS — M54.42 ACUTE LEFT-SIDED LOW BACK PAIN WITH LEFT-SIDED SCIATICA: ICD-10-CM

## 2024-04-24 PROCEDURE — 97161 PT EVAL LOW COMPLEX 20 MIN: CPT | Mod: GP | Performed by: PHYSICAL THERAPIST

## 2024-04-24 PROCEDURE — 97110 THERAPEUTIC EXERCISES: CPT | Mod: GP | Performed by: PHYSICAL THERAPIST

## 2024-04-24 NOTE — PROGRESS NOTES
"PHYSICAL THERAPY EVALUATION  Type of Visit: Evaluation    See electronic medical record for Abuse and Falls Screening details.    Subjective       Presenting condition or subjective complaint: sciatica  Date of onset: 04/22/24 (referred to PT)    Relevant medical history: Overweight; High blood pressure   Dates & types of surgery: L knee ACL twice    Current episode started about a month ago without incident.  Pain is L sided down through the low back, buttock, thigh, calf.  Finds standing, walking, sleeping to be problematic.  Can stand or walk less than five minutes before pain comes on.  Feels better sitting with L leg up [into hip flexion].  Has been doing pelvic tilts, knee to chest, piriformis stretching, standing lumbar extension, seated thoracic rotation. Pt notes that the only one that feels good is knee to chest.  \"It almost feels better\" when not active.  Pain isn't constant.  Starts at the back and then spreads out.  May be better first thing in the morning, although hasn't necessarily been active at that point.  \"It's not getting any worse but it's not getting any better.\"  Previous episode of back pain but never went down the leg.  Pt states his goals include standing and walking better plus being able to sleep on his side again.    Prior diagnostic imaging/testing results: X-ray     Prior therapy history for the same diagnosis, illness or injury: No      Prior Level of Function  Transfers: Independent  Ambulation: Independent  ADL: Independent    Living Environment (not completed as pt arrived late)  Social support:     Type of home:     Stairs to enter the home:         Ramp:     Stairs inside the home:         Help at home:    Equipment owned:       Employment:      Hobbies/Interests:      Patient goals for therapy: stand, walk       Objective   LUMBAR SPINE EVALUATION  PAIN: see above  POSTURE: Pt sits with hypolordotic posture.  No lateral shift.  GAIT: Pt ambulates without device, mildly antalgic " pattern favoring L.  ROM: Standing lumbar flexion AROM mildy restricted with centralization and pt reports gets better with repetition.  Standing lumbar extension peripheralization.  No change in symptoms with B SGIS.  PELVIC/SI SCREEN: Negative Salvatore, thigh thrust, FADIR B.  STRENGTH: TA MMT 1/5.  MYOTOMES: 5/5 MMT hip flexion, knee flexion and extension, ankle DF and eversion, great toe extension.  DERMATOMES: Pt notes area of L medial knee to be slightly less sensitive to light touch sensation but this has been consistent since his knee surgeries.  NEURAL TENSION: Negative SLR B.  PALPATION: No tenderness to palpation.    Assessment & Plan   CLINICAL IMPRESSIONS  Medical Diagnosis: Acute left-sided low back pain with left-sided sciatica    Treatment Diagnosis: Lumbar radiculopathy   Impression/Assessment: Patient is a 69 year old male with low back complaints.  The following significant findings have been identified: Pain, Decreased ROM/flexibility, Decreased strength, Impaired gait, Decreased activity tolerance, and Impaired posture. These impairments interfere with their ability to perform community mobility as compared to previous level of function.     Clinical Decision Making (Complexity):  Clinical Presentation: Stable/Uncomplicated  Clinical Presentation Rationale: based on medical and personal factors listed in PT evaluation  Clinical Decision Making (Complexity): Low complexity    PLAN OF CARE  Treatment Interventions:  Interventions: Manual Therapy, Neuromuscular Re-education, Therapeutic Activity, Therapeutic Exercise    Long Term Goals     PT Goal 1  Goal Description: Minutes pt will be able to stand: 30  Rationale: to maximize safety and independence within the community  Goal Progress: Minutes pt can stand: 5  Target Date: 06/19/24      Frequency of Treatment: once per week  Duration of Treatment: eight weeks    Education Assessment:   Learner/Method: Patient  Education Comments: Performance in  clinic    Risks and benefits of evaluation/treatment have been explained.   Patient/Family/caregiver agrees with Plan of Care.     Evaluation Time:     PT Eval, Low Complexity Minutes (80452): 20    Signing Clinician: MICHAEL Silva [facility]                                                                                   OUTPATIENT PHYSICAL THERAPY      PLAN OF TREATMENT FOR OUTPATIENT REHABILITATION   Patient's Last Name, First Name, Mayur Chowdhury YOB: 1955   Provider's Name   [facility]   Medical Record No.  [id number]     Onset Date: 04/22/24 (referred to PT)  Start of Care Date: 04/24/24     Medical Diagnosis:  Acute left-sided low back pain with left-sided sciatica      PT Treatment Diagnosis:  Lumbar radiculopathy Plan of Treatment  Frequency/Duration: once per week/ eight weeks    Certification date from 04/24/24 to 06/19/24         See note for plan of treatment details and functional goals     Juan Parker PT                         I CERTIFY THE NEED FOR THESE SERVICES FURNISHED UNDER        THIS PLAN OF TREATMENT AND WHILE UNDER MY CARE     (Physician attestation of this document indicates review and certification of the therapy plan).              Referring Provider:  Michael Maddox    Initial Assessment  See Epic Evaluation- Start of Care Date: 04/24/24

## 2024-04-24 NOTE — Clinical Note
Pt scheduled to see you on Monday.  Arrived pretty late today for evaluation but did seem to demonstrate directional preference toward flexion.  Will be curious if that is sustained, especially as we de-emphasized other directions.  -- Juan

## 2024-04-29 ENCOUNTER — THERAPY VISIT (OUTPATIENT)
Dept: PHYSICAL THERAPY | Facility: CLINIC | Age: 69
End: 2024-04-29
Attending: PHYSICIAN ASSISTANT
Payer: MEDICARE

## 2024-04-29 DIAGNOSIS — M54.42 ACUTE LEFT-SIDED LOW BACK PAIN WITH LEFT-SIDED SCIATICA: ICD-10-CM

## 2024-04-29 DIAGNOSIS — M54.16 LUMBAR RADICULOPATHY: Primary | ICD-10-CM

## 2024-04-29 PROCEDURE — 97110 THERAPEUTIC EXERCISES: CPT | Mod: GP

## 2024-05-13 ENCOUNTER — OFFICE VISIT (OUTPATIENT)
Dept: FAMILY MEDICINE | Facility: CLINIC | Age: 69
End: 2024-05-13
Payer: MEDICARE

## 2024-05-13 ENCOUNTER — THERAPY VISIT (OUTPATIENT)
Dept: PHYSICAL THERAPY | Facility: CLINIC | Age: 69
End: 2024-05-13
Payer: MEDICARE

## 2024-05-13 VITALS
HEART RATE: 100 BPM | HEIGHT: 65 IN | OXYGEN SATURATION: 96 % | SYSTOLIC BLOOD PRESSURE: 122 MMHG | WEIGHT: 204 LBS | TEMPERATURE: 99 F | DIASTOLIC BLOOD PRESSURE: 86 MMHG | RESPIRATION RATE: 18 BRPM | BODY MASS INDEX: 33.99 KG/M2

## 2024-05-13 DIAGNOSIS — I10 ESSENTIAL HYPERTENSION WITH GOAL BLOOD PRESSURE LESS THAN 140/90: ICD-10-CM

## 2024-05-13 DIAGNOSIS — R73.09 ELEVATED GLUCOSE: ICD-10-CM

## 2024-05-13 DIAGNOSIS — Z23 NEED FOR SHINGLES VACCINE: ICD-10-CM

## 2024-05-13 DIAGNOSIS — R22.1 MASS OF NECK: ICD-10-CM

## 2024-05-13 DIAGNOSIS — E11.9 TYPE 2 DIABETES MELLITUS WITHOUT COMPLICATION, WITHOUT LONG-TERM CURRENT USE OF INSULIN (H): ICD-10-CM

## 2024-05-13 DIAGNOSIS — Z29.11 NEED FOR VACCINATION AGAINST RESPIRATORY SYNCYTIAL VIRUS: ICD-10-CM

## 2024-05-13 DIAGNOSIS — Z71.6 ENCOUNTER FOR SMOKING CESSATION COUNSELING: ICD-10-CM

## 2024-05-13 DIAGNOSIS — E78.5 HYPERLIPIDEMIA LDL GOAL <100: ICD-10-CM

## 2024-05-13 DIAGNOSIS — M54.42 ACUTE LEFT-SIDED LOW BACK PAIN WITH LEFT-SIDED SCIATICA: ICD-10-CM

## 2024-05-13 DIAGNOSIS — M54.16 LUMBAR RADICULOPATHY: Primary | ICD-10-CM

## 2024-05-13 DIAGNOSIS — Z00.00 ENCOUNTER FOR MEDICARE ANNUAL WELLNESS EXAM: Primary | ICD-10-CM

## 2024-05-13 LAB
ANION GAP SERPL CALCULATED.3IONS-SCNC: 12 MMOL/L (ref 7–15)
BASOPHILS # BLD AUTO: 0.1 10E3/UL (ref 0–0.2)
BASOPHILS NFR BLD AUTO: 0 %
BUN SERPL-MCNC: 16.9 MG/DL (ref 8–23)
CALCIUM SERPL-MCNC: 9.7 MG/DL (ref 8.8–10.2)
CHLORIDE SERPL-SCNC: 97 MMOL/L (ref 98–107)
CHOLEST SERPL-MCNC: 155 MG/DL
CREAT SERPL-MCNC: 1.17 MG/DL (ref 0.67–1.17)
DEPRECATED HCO3 PLAS-SCNC: 30 MMOL/L (ref 22–29)
EGFRCR SERPLBLD CKD-EPI 2021: 68 ML/MIN/1.73M2
EOSINOPHIL # BLD AUTO: 0.5 10E3/UL (ref 0–0.7)
EOSINOPHIL NFR BLD AUTO: 4 %
ERYTHROCYTE [DISTWIDTH] IN BLOOD BY AUTOMATED COUNT: 12.7 % (ref 10–15)
FASTING STATUS PATIENT QL REPORTED: YES
FASTING STATUS PATIENT QL REPORTED: YES
GLUCOSE SERPL-MCNC: 136 MG/DL (ref 70–99)
HBA1C MFR BLD: 6.5 % (ref 0–5.6)
HCT VFR BLD AUTO: 53.7 % (ref 40–53)
HDLC SERPL-MCNC: 42 MG/DL
HGB BLD-MCNC: 18.6 G/DL (ref 13.3–17.7)
IMM GRANULOCYTES # BLD: 0 10E3/UL
IMM GRANULOCYTES NFR BLD: 0 %
LDLC SERPL CALC-MCNC: 63 MG/DL
LYMPHOCYTES # BLD AUTO: 3.2 10E3/UL (ref 0.8–5.3)
LYMPHOCYTES NFR BLD AUTO: 26 %
MCH RBC QN AUTO: 31.5 PG (ref 26.5–33)
MCHC RBC AUTO-ENTMCNC: 34.6 G/DL (ref 31.5–36.5)
MCV RBC AUTO: 91 FL (ref 78–100)
MONOCYTES # BLD AUTO: 1.2 10E3/UL (ref 0–1.3)
MONOCYTES NFR BLD AUTO: 10 %
NEUTROPHILS # BLD AUTO: 7.2 10E3/UL (ref 1.6–8.3)
NEUTROPHILS NFR BLD AUTO: 59 %
NONHDLC SERPL-MCNC: 113 MG/DL
PLATELET # BLD AUTO: 296 10E3/UL (ref 150–450)
POTASSIUM SERPL-SCNC: 4.3 MMOL/L (ref 3.4–5.3)
RBC # BLD AUTO: 5.9 10E6/UL (ref 4.4–5.9)
SODIUM SERPL-SCNC: 139 MMOL/L (ref 135–145)
TRIGL SERPL-MCNC: 252 MG/DL
WBC # BLD AUTO: 12.1 10E3/UL (ref 4–11)

## 2024-05-13 PROCEDURE — 97530 THERAPEUTIC ACTIVITIES: CPT | Mod: GP

## 2024-05-13 PROCEDURE — G0439 PPPS, SUBSEQ VISIT: HCPCS | Performed by: FAMILY MEDICINE

## 2024-05-13 PROCEDURE — 80061 LIPID PANEL: CPT | Performed by: FAMILY MEDICINE

## 2024-05-13 PROCEDURE — 36415 COLL VENOUS BLD VENIPUNCTURE: CPT | Performed by: FAMILY MEDICINE

## 2024-05-13 PROCEDURE — 80048 BASIC METABOLIC PNL TOTAL CA: CPT | Performed by: FAMILY MEDICINE

## 2024-05-13 PROCEDURE — 83036 HEMOGLOBIN GLYCOSYLATED A1C: CPT | Performed by: FAMILY MEDICINE

## 2024-05-13 PROCEDURE — 85025 COMPLETE CBC W/AUTO DIFF WBC: CPT | Performed by: FAMILY MEDICINE

## 2024-05-13 RX ORDER — PRAVASTATIN SODIUM 80 MG/1
80 TABLET ORAL DAILY
Qty: 90 TABLET | Refills: 4 | Status: SHIPPED | OUTPATIENT
Start: 2024-05-13

## 2024-05-13 RX ORDER — TRIAMTERENE AND HYDROCHLOROTHIAZIDE 75; 50 MG/1; MG/1
1 TABLET ORAL DAILY
Qty: 90 TABLET | Refills: 4 | Status: SHIPPED | OUTPATIENT
Start: 2024-05-13

## 2024-05-13 RX ORDER — VARENICLINE TARTRATE 1 MG/1
1 TABLET, FILM COATED ORAL 2 TIMES DAILY
Qty: 62 TABLET | Refills: 1 | Status: SHIPPED | OUTPATIENT
Start: 2024-05-13 | End: 2024-07-29

## 2024-05-13 RX ORDER — RESPIRATORY SYNCYTIAL VIRUS VACCINE 120MCG/0.5
0.5 KIT INTRAMUSCULAR ONCE
Qty: 1 EACH | Refills: 0 | Status: CANCELLED | OUTPATIENT
Start: 2024-05-13 | End: 2024-05-13

## 2024-05-13 RX ORDER — AMLODIPINE BESYLATE 5 MG/1
5 TABLET ORAL DAILY
Qty: 90 TABLET | Refills: 4 | Status: SHIPPED | OUTPATIENT
Start: 2024-05-13

## 2024-05-13 SDOH — HEALTH STABILITY: PHYSICAL HEALTH: ON AVERAGE, HOW MANY DAYS PER WEEK DO YOU ENGAGE IN MODERATE TO STRENUOUS EXERCISE (LIKE A BRISK WALK)?: 1 DAY

## 2024-05-13 ASSESSMENT — SOCIAL DETERMINANTS OF HEALTH (SDOH): HOW OFTEN DO YOU GET TOGETHER WITH FRIENDS OR RELATIVES?: ONCE A WEEK

## 2024-05-13 NOTE — PROGRESS NOTES
"Preventive Care Visit  [facility]  ELDA RUIZ DO, Family Medicine  May 13, 2024      Assessment & Plan   Problem List Items Addressed This Visit       Hyperlipidemia LDL goal <100    Relevant Medications    pravastatin (PRAVACHOL) 80 MG tablet    Other Relevant Orders    Lipid panel reflex to direct LDL Non-fasting (Completed)    Lipid panel reflex to direct LDL Fasting    Essential hypertension with goal blood pressure less than 140/90    Relevant Medications    amLODIPine (NORVASC) 5 MG tablet    triamterene-HCTZ (MAXZIDE) 75-50 MG tablet    Other Relevant Orders    Basic metabolic panel  (Ca, Cl, CO2, Creat, Gluc, K, Na, BUN) (Completed)     Other Visit Diagnoses       Encounter for Medicare annual wellness exam    -  Primary    Encounter for smoking cessation counseling        Relevant Medications    varenicline (CHANTIX) 1 MG tablet    Need for shingles vaccine        Need for vaccination against respiratory syncytial virus        Mass of neck        Relevant Orders    CBC with platelets and differential (Completed)    US Head Neck Soft Tissue    Elevated glucose        Relevant Orders    Hemoglobin A1c (Completed)    Type 2 diabetes mellitus without complication, without long-term current use of insulin (H)        Relevant Medications    semaglutide (OZEMPIC) 2 MG/3ML pen           New T2DM see orders    Patient has been advised of split billing requirements and indicates understanding: Yes       BMI  Estimated body mass index is 33.69 kg/m  as calculated from the following:    Height as of this encounter: 1.657 m (5' 5.25\").    Weight as of this encounter: 92.5 kg (204 lb).       Counseling  Appropriate preventive services were discussed with this patient, including applicable screening as appropriate for fall prevention, nutrition, physical activity, Tobacco-use cessation, weight loss and cognition.  Checklist reviewing preventive services available has been given to the " patient.  Reviewed patient's diet, addressing concerns and/or questions.   He is at risk for lack of exercise and has been provided with information to increase physical activity for the benefit of his well-being.           Quintin Richard is a 69 year old, presenting for the following:  Physical        5/13/2024     6:58 AM   Additional Questions   Roomed by Alyx LOBO CMA         Health Care Directive  Patient does not have a Health Care Directive or Living Will: Discussed advance care planning with patient; information given to patient to review.    HPI              5/13/2024   General Health   How would you rate your overall physical health? Good   Feel stress (tense, anxious, or unable to sleep) Not at all         5/13/2024   Nutrition   Diet: Regular (no restrictions)         5/13/2024   Exercise   Days per week of moderate/strenous exercise 1 day   (!) EXERCISE CONCERN      5/13/2024   Social Factors   Frequency of gathering with friends or relatives Once a week   Worry food won't last until get money to buy more No   Food not last or not have enough money for food? No   Do you have housing?  Yes   Are you worried about losing your housing? No   Lack of transportation? No   Unable to get utilities (heat,electricity)? No         5/13/2024   Fall Risk   Fallen 2 or more times in the past year? No   Trouble with walking or balance? No          5/13/2024   Activities of Daily Living- Home Safety   Needs help with the following daily activites None of the above   Safety concerns in the home None of the above         5/13/2024   Dental   Dentist two times every year? Yes         5/13/2024   Hearing Screening   Hearing concerns? None of the above         5/13/2024   Driving Risk Screening   Patient/family members have concerns about driving No         5/13/2024   General Alertness/Fatigue Screening   Have you been more tired than usual lately? No         5/13/2024   Urinary Incontinence Screening   Bothered by  leaking urine in past 6 months No         2024   TB Screening   Were you born outside of the [place]? No         Today's PHQ-2 Score:       2024     6:54 AM   PHQ-2 (  [employer])   Q1: Little interest or pleasure in doing things 0   Q2: Feeling down, depressed or hopeless 0   PHQ-2 Score 0   Q1: Little interest or pleasure in doing things Not at all   Q2: Feeling down, depressed or hopeless Not at all   PHQ-2 Score 0           2024   Substance Use   Alcohol more than 3/day or more than 7/wk No   Do you have a current opioid prescription? No   How severe/bad is pain from 1 to 10? 6/10   Do you use any other substances recreationally? (!) CANNABIS PRODUCTS     Social History     Tobacco Use    Smoking status: Every Day     Current packs/day: 0.00     Average packs/day: 1 pack/day for 45.0 years (45.0 ttl pk-yrs)     Types: Cigarettes     Start date: 3/7/1977     Last attempt to quit: 3/7/2022     Years since quittin.1     Passive exposure: Current    Smokeless tobacco: Never    Tobacco comments:     requit, uses Chantix successfully   Vaping Use    Vaping status: Never Used   Substance Use Topics    Alcohol use: Yes     Alcohol/week: 6.0 standard drinks of alcohol     Comment: 5 beers per month     Drug use: No       Last PSA:   PSA   Date Value Ref Range Status   2021 4.86 (H) 0 - 4 ug/L Final     Comment:     Assay Method:  Chemiluminescence using Siemens Vista analyzer     Prostate Specific Antigen Screen   Date Value Ref Range Status   2022 5.34 (H) 0.00 - 4.00 ug/L Final     ASCVD Risk   The 10-year ASCVD risk score (Ayaz WATTS, et al., 2019) is: 33.8%    Values used to calculate the score:      Age: 69 years      Sex: Male      Is Non- : No      Diabetic: Yes      Tobacco smoker: Yes      Systolic Blood Pressure: 122 mmHg      Is BP treated: Yes      HDL Cholesterol: 42 mg/dL      Total Cholesterol: 155 mg/dL            Reviewed and updated as needed  "this visit by Provider                      Current providers sharing in care for this patient include:  Patient Care Team:  Kevin Jackson MD as PCP - General (Internal Medicine - Pediatrics)  Kevin Jackson MD as Assigned PCP    The following health maintenance items are reviewed in Epic and correct as of today:  Health Maintenance   Topic Date Due    URINE DRUG SCREEN  Never done    RSV VACCINE (Pregnancy & 60+) (1 - 1-dose 60+ series) Never done    Pneumococcal Vaccine: 65+ Years (2 of 2 - PCV) 05/10/2017    ZOSTER IMMUNIZATION (2 of 3) 02/14/2018    LUNG CANCER SCREENING  04/12/2023    COVID-19 Vaccine (7 - 2023-24 season) 02/06/2024    ANNUAL REVIEW OF HM ORDERS  05/09/2024    NICOTINE/TOBACCO CESSATION COUNSELING Q 1 YR  04/22/2025    MEDICARE ANNUAL WELLNESS VISIT  05/13/2025    LIPID  05/13/2025    FALL RISK ASSESSMENT  05/13/2025    GLUCOSE  05/13/2027    COLORECTAL CANCER SCREENING  07/19/2028    DTAP/TDAP/TD IMMUNIZATION (3 - Td or Tdap) 09/26/2028    ADVANCE CARE PLANNING  05/13/2029    HEPATITIS C SCREENING  Completed    PHQ-2 (once per calendar year)  Completed    INFLUENZA VACCINE  Completed    AORTIC ANEURYSM SCREENING (SYSTEM ASSIGNED)  Completed    IPV IMMUNIZATION  Aged Out    HPV IMMUNIZATION  Aged Out    MENINGITIS IMMUNIZATION  Aged Out    RSV MONOCLONAL ANTIBODY  Aged Out            Objective    Exam  /86 (BP Location: Right arm, Patient Position: Chair, Cuff Size: Adult Large)   Pulse 100   Temp 99  F (37.2  C) (Temporal)   Resp 18   Ht 1.657 m (5' 5.25\")   Wt 92.5 kg (204 lb)   SpO2 96%   BMI 33.69 kg/m     Estimated body mass index is 33.69 kg/m  as calculated from the following:    Height as of this encounter: 1.657 m (5' 5.25\").    Weight as of this encounter: 92.5 kg (204 lb).    Physical Exam  GENERAL: alert and no distress, obese  NECK: no adenopathy, no asymmetry, masses, or scars  RESP: lungs clear to auscultation - no rales, rhonchi or wheezes  CV: regular rate " and rhythm, normal S1 S2, no S3 or S4, no murmur, click or rub, no peripheral edema  ABDOMEN: soft, nontender, no hepatosplenomegaly, no masses and bowel sounds normal  MS: no gross musculoskeletal defects noted, no edema         5/13/2024   Mini Cog   Clock Draw Score 2 Normal   3 Item Recall 3 objects recalled   Mini Cog Total Score 5              Signed Electronically by: ELDA RUIZ DO

## 2024-05-13 NOTE — PATIENT INSTRUCTIONS
"Preventive Care Advice   This is general advice we often give to help people stay healthy. Your care team may have specific advice just for you. Please talk to your care team about your own preventive care needs.  Lifestyle  Exercise at least 150 minutes each week (30 minutes a day, 5 days a week).  Do muscle strengthening activities 2 days a week. These help control your weight and prevent disease.  No smoking.  Wear sunscreen to prevent skin cancer.  Have your home tested for radon every 2 to 5 years. Radon is a colorless, odorless gas that can harm your lungs. To learn more, go to www.health.[facility].mn. and search for \"Radon in Homes.\"  Keep guns unloaded and locked up in a safe place like a safe or gun vault, or, use a gun lock and hide the keys. Always lock away bullets separately. To learn more, visit [place].gov and search for \"safe gun storage.\"  Nutrition  Eat 5 or more servings of fruits and vegetables each day.  Try wheat bread, brown rice and whole grain pasta (instead of white bread, rice, and pasta).  Get enough calcium and vitamin D. Check the label on foods and aim for 100% of the RDA (recommended daily allowance).  Regular exams  Have a dental exam and cleaning every 6 months.  See your health care team every year to talk about:  Any changes in your health.  Any medicines your care team has prescribed.  Preventive care, family planning, and ways to prevent chronic diseases.  Shots (vaccines)   HPV shots (up to age 26), if you've never had them before.  Hepatitis B shots (up to age 59), if you've never had them before.  COVID-19 shot: Get this shot when it's due.  Flu shot: Get a flu shot every year.  Tetanus shot: Get a tetanus shot every 10 years.  Pneumococcal, hepatitis A, and RSV shots: Ask your care team if you need these based on your risk.  Shingles shot (for age 50 and up).  General health tests  Diabetes screening:  Starting at age 35, Get screened for diabetes at least every 3 years.  If " you are younger than age 35, ask your care team if you should be screened for diabetes.  Cholesterol test: At age 39, start having a cholesterol test every 5 years, or more often if advised.  Bone density scan (DEXA): At age 50, ask your care team if you should have this scan for osteoporosis (brittle bones).  Hepatitis C: Get tested at least once in your life.  Abdominal aortic aneurysm screening: Talk to your doctor about having this screening if you:  Have ever smoked; and  Are biologically male; and  Are between the ages of 65 and 75.  STIs (sexually transmitted infections)  Before age 24: Ask your care team if you should be screened for STIs.  After age 24: Get screened for STIs if you're at risk. You are at risk for STIs (including HIV) if:  You are sexually active with more than one person.  You don't use condoms every time.  You or a partner was diagnosed with a sexually transmitted infection.  If you are at risk for HIV, ask about PrEP medicine to prevent HIV.  Get tested for HIV at least once in your life, whether you are at risk for HIV or not.  Cancer screening tests  Cervical cancer screening: If you have a cervix, begin getting regular cervical cancer screening tests at age 21. Most people who have regular screenings with normal results can stop after age 65. Talk about this with your provider.  Breast cancer scan (mammogram): If you've ever had breasts, begin having regular mammograms starting at age 40. This is a scan to check for breast cancer.  Colon cancer screening: It is important to start screening for colon cancer at age 45.  Have a colonoscopy test every 10 years (or more often if you're at risk) Or, ask your provider about stool tests like a FIT test every year or Cologuard test every 3 years.  To learn more about your testing options, visit: www.[employer]/997344.pdf.  For help making a decision, visit: estela/dk71463.  Prostate cancer screening test: If you have a prostate and are age 55  to 69, ask your provider if you would benefit from a yearly prostate cancer screening test.  Lung cancer screening: If you are a current or former smoker age 50 to 80, ask your care team if ongoing lung cancer screenings are right for you.  For informational purposes only. Not to replace the advice of your health care provider. Copyright   2023 [facility]. All rights reserved. Clinically reviewed by the [facility]. [employer] 726821 - REV 04/24.    Substance Use Disorder: Care Instructions  Overview     You can improve your life and health by stopping your use of alcohol or drugs. When you don't drink or use drugs, you may feel and sleep better. You may get along better with your family, friends, and coworkers. There are medicines and programs that can help with substance use disorder.  How can you care for yourself at home?  Here are some ways to help you stay sober and prevent relapse.  If you have been given medicine to help keep you sober or reduce your cravings, be sure to take it exactly as prescribed.  Talk to your doctor about programs that can help you stop using drugs or drinking alcohol.  Do not keep alcohol or drugs in your home.  Plan ahead. Think about what you'll say if other people ask you to drink or use drugs. Try not to spend time with people who drink or use drugs.  Use the time and money spent on drinking or drugs to do something that's important to you.  Preventing a relapse  Have a plan to deal with relapse. Learn to recognize changes in your thinking that lead you to drink or use drugs. Get help before you start to drink or use drugs again.  Try to stay away from situations, friends, or places that may lead you to drink or use drugs.  If you feel the need to drink alcohol or use drugs again, seek help right away. Call a trusted friend or family member. Some people get support from organizations such as Narcotics Anonymous or [employer] or from  treatment facilities.  If you relapse, get help as soon as you can. Some people make a plan with another person that outlines what they want that person to do for them if they relapse. The plan usually includes how to handle the relapse and who to notify in case of relapse.  Don't give up. Remember that a relapse doesn't mean that you have failed. Use the experience to learn the triggers that lead you to drink or use drugs. Then quit again. Recovery is a lifelong process. Many people have several relapses before they are able to quit for good.  Follow-up care is a key part of your treatment and safety. Be sure to make and go to all appointments, and call your doctor if you are having problems. It's also a good idea to know your test results and keep a list of the medicines you take.  When should you call for help?   Call 911  anytime you think you may need emergency care. For example, call if you or someone else:    Has overdosed or has withdrawal signs. Be sure to tell the emergency workers that you are or someone else is using or trying to quit using drugs. Overdose or withdrawal signs may include:  Losing consciousness.  Seizure.  Seeing or hearing things that aren't there (hallucinations).     Is thinking or talking about suicide or harming others.   Where to get help 24 hours a day, 7 days a week   If you or someone you know talks about suicide, self-harm, a mental health crisis, a substance use crisis, or any other kind of emotional distress, get help right away. You can:    Call the Suicide and Crisis Lifeline at 988.     Call 9-277-269-TALK (1-234.952.7851).     Text HOME to 649836 to access the Crisis Text Line.   Consider saving these numbers in your phone.  Go to [employer] for more information or to chat online.  Call your doctor now or seek immediate medical care if:    You are having withdrawal symptoms. These may include nausea or vomiting, sweating, shakiness, and anxiety.   Watch closely for  "changes in your health, and be sure to contact your doctor if:    You have a relapse.     You need more help or support to stop.   Where can you learn more?  Go to https://www.[employer].net/patiented  Enter H573 in the search box to learn more about \"Substance Use Disorder: Care Instructions.\"  Current as of: November 15, 2023               Content Version: 14.0    8525-0209 [email or web address].   Care instructions adapted under license by your healthcare professional. If you have questions about a medical condition or this instruction, always ask your healthcare professional. [email or web address] disclaims any warranty or liability for your use of this information.      "

## 2024-05-13 NOTE — PROGRESS NOTES
05/13/24 0500   Appointment Info   Signing clinician's name / credentials Clemente Dempsey, DPT, CSCS   Total/Authorized Visits 8   Visits Used 3   Medical Diagnosis Acute left-sided low back pain with left-sided sciatica   PT Tx Diagnosis Lumbar radiculopathy   Quick Adds Certification   Progress Note/Certification   Start of Care Date 04/24/24   Onset of illness/injury or Date of Surgery 04/22/24  (referred to PT)   Therapy Frequency once per week   Predicted Duration eight weeks   Certification date from 04/24/24   Certification date to 06/19/24   Progress Note Completed Date 04/24/24   PT Goal 1   Goal Description Minutes pt will be able to stand: 30   Rationale to maximize safety and independence within the community   Goal Progress Minutes pt can stand: 5   Target Date 06/19/24   Subjective Report   Subjective Report Things are about the same. Has been sleeping on his side in fetal position, it's the only way it can get him to sleep.   Treatment Interventions (PT)   Interventions Therapeutic Activity   Therapeutic Activity   Therapeutic Activities: dynamic activities to improve functional performance minutes ([id number]) 15   Ther Act 1 Pt eduction/discussion   Ther Act 1 - Details Discussed/reviewed pt's HEP and urged continued performance of exercises. Pt does state that they help slightly. Discussed one other exercise to add to program (birddog). Discussed POC going forward. Pt in agreement with plan   Skilled Intervention education/discussion   Patient Response/Progress demonstrates understanding, in agreement to plan   Education   Learner/Method Patient   Plan   Home program See above   Plan for next session DC from PT   Total Session Time   Timed Code Treatment Minutes 15   Total Treatment Time (sum of timed and untimed services) 15         DISCHARGE  Reason for Discharge: Patient chooses to discontinue therapy.    Equipment Issued: none    Discharge Plan: Patient to continue home program. Pt reports he is  slowly getting better little by little, but doesn't feel that PT is helping very much.     Referring Provider:  Michael Maddox

## 2024-05-15 PROBLEM — R73.03 PREDIABETES: Status: RESOLVED | Noted: 2019-10-27 | Resolved: 2024-05-15

## 2024-07-01 ENCOUNTER — VIRTUAL VISIT (OUTPATIENT)
Dept: EDUCATION SERVICES | Facility: CLINIC | Age: 69
End: 2024-07-01
Attending: FAMILY MEDICINE
Payer: MEDICARE

## 2024-07-01 DIAGNOSIS — E11.9 TYPE 2 DIABETES MELLITUS WITHOUT COMPLICATION, WITHOUT LONG-TERM CURRENT USE OF INSULIN (H): ICD-10-CM

## 2024-07-01 PROCEDURE — 98968 PH1 ASSMT&MGMT NQHP 21-30: CPT | Mod: 93 | Performed by: DIETITIAN, REGISTERED

## 2024-07-01 RX ORDER — METFORMIN HCL 500 MG
500 TABLET, EXTENDED RELEASE 24 HR ORAL
Qty: 30 TABLET | Refills: 0 | Status: SHIPPED | OUTPATIENT
Start: 2024-07-01 | End: 2024-09-18

## 2024-07-01 NOTE — LETTER
7/1/2024         RE: Mayur Sheth  [address]  [facility] [address]        Dear Colleague,    Thank you for referring your patient, Mayur Sheth, to the [facility]. Please see a copy of my visit note below.    Diabetes Self-Management Education & Support    Presents for: Initial Assessment for new diagnosis    Type of Service: Telephone Visit    Audio only visit done, as patient does not have access to audio-visual technology.    Individual visit provided, given no group classes are available for 2 months.     Originating Location (Patient Location): Home  Distant Location (Provider Location): Offsite  Mode of Communication:  Telephone    Telephone Visit Start Time:  1:31p  Telephone Visit End Time (telephone visit stop time): 1:56p    How would patient like to obtain AVS? MyChart      ASSESSMENT:  Jose Manuel comes in for new diagnosis education. He was prescribed Ozempic but never started it - concerns about thyroid cancer as well as report of significant fear of needles. He would prefer an oral med and we discussed the use of metformin at a low dose to start. He is agreeable. He declines interest in group classes, would prefer virtual one on one follow up as he feels he knows a fair amount already. Strong family history of T2DM so his siblings are helping him with this diagnosis.     Patient's most recent   Lab Results   Component Value Date    A1C 6.5 05/13/2024     is meeting goal of <7.0    Diabetes knowledge and skills assessment:   Patient is knowledgeable in diabetes management concepts related to: none, new diagnosis     Continue education with the following diabetes management concepts: Healthy Eating, Being Active, Monitoring, Taking Medication, Problem Solving, Reducing Risks, and Healthy Coping    Based on learning assessment above, most appropriate setting for further diabetes education would be: Group class or Individual setting.      PLAN    Test  "blood sugar once daily, fasting   Start metformin  mg/day, plan to titrate at follow up [facility] visit as needed     Topics to cover at upcoming visits: Healthy Eating, Being Active, Monitoring, Taking Medication, Problem Solving, Reducing Risks, and Healthy Coping    Follow-up: 7/15/24    See Care Plan for co-developed, patient-state behavior change goals.  AVS provided for patient today.    Education Materials Provided:  Understanding Diabetes Booklet, blood sugar log sheet, MyPlate planner, Goals of Diabetes Care, Diabetes Medication information, & [facility] Distress handout sent via email       SUBJECTIVE/OBJECTIVE:  Presents for: Initial Assessment for new diagnosis  Accompanied by: Self  Diabetes education in the past 24mo: No  Focus of Visit: Patient Unsure  Diabetes type: Type 2  Date of diagnosis: 5/2024  Disease course: Stable  How confident are you filling out medical forms by yourself:: Not Assessed  Diabetes management related comments/concerns: Not interested in group classes, strong family history of T2DM, wants to know more about meds  Transportation concerns: No  Difficulty affording diabetes medication?: No  Difficulty affording diabetes testing supplies?: No  Other concerns:: None  Cultural Influences/Ethnic Background:  Not  or       Diabetes Symptoms & Complications:  Diabetes Related Symptoms: None  Weight trend: Decreasing  Symptom course: Stable  Disease course: Stable  Complications assessed today?: No    Patient Problem List and Family Medical History reviewed for relevant medical history, current medical status, and diabetes risk factors.    Vitals:  There were no vitals taken for this visit.  Estimated body mass index is 33.69 kg/m  as calculated from the following:    Height as of 5/13/24: 1.657 m (5' 5.25\").    Weight as of 5/13/24: 92.5 kg (204 lb).   Last 3 BP:   BP Readings from Last 3 Encounters:   05/13/24 122/86   04/22/24 122/70   07/19/23 121/81 " "      History   Smoking Status     Every Day     Types: Cigarettes   Smokeless Tobacco     Never       Labs:  Lab Results   Component Value Date    A1C 6.5 05/13/2024     Lab Results   Component Value Date     05/13/2024     04/07/2022     04/03/2021     Lab Results   Component Value Date    LDL 63 05/13/2024    LDL 67 03/01/2021     HDL Cholesterol   Date Value Ref Range Status   03/01/2021 45 >39 mg/dL Final     Direct Measure HDL   Date Value Ref Range Status   05/13/2024 42 >=40 mg/dL Final   ]  GFR Estimate   Date Value Ref Range Status   05/13/2024 68 >60 mL/min/1.73m2 Final   04/03/2021 >60 >60 ml/min/1.73m2 Final     GFR Estimate If Black   Date Value Ref Range Status   04/03/2021 >60 >60 ml/min/1.73m2 Final     Lab Results   Component Value Date    CR 1.17 05/13/2024    CR 0.95 04/03/2021     No results found for: \"MICROALBUMIN\"    Healthy Eating:  Healthy Eating Assessed Today: Yes  Cultural/Gnosticist diet restrictions?: No  Do you have any food allergies or intolerances?: No  Meal planning/habits: Smaller portions, Low carb  Who cooks/prepares meals for you?: Self, Spouse  Who purchases food in  your home?: Self, Spouse  Meals include: Breakfast  Breakfast: eggs OR ww cereal - Raisin Bran OR granola w/ raisins; 12 oz V8 juice  Lunch: leftovers - smaller portions  Dinner: pizza OR scalloped potatoes, vegetables OR stir padilla \"well balanced\"  Has patient met with a dietitian in the past?: No    Being Active:  Being Active Assessed Today: No    Monitoring:  Monitoring Assessed Today: Yes  Did patient bring glucose meter to appointment? : Yes  Blood Glucose Meter: Accu-chek  Times checking blood sugar at home (number): 1  Times checking blood sugar at home (per): Day    Blood sugars:132, 195 (1 hr after meal), 121, 97, 129, 202 (1 hr after meal), 162, (1 hr after), 119    Taking Medications:  Diabetes Medication(s)       Incretin Mimetic Agents       semaglutide (OZEMPIC) 2 MG/3ML pen " Inject 0.25 mg Subcutaneous every 7 days            Taking Medication Assessed Today: No    Problem Solving:  Problem Solving Assessed Today: No              Reducing Risks:  Reducing Risks Assessed Today: Yes  Diabetes Risks: Age over 45 years, Family History, Sedentary Lifestyle  CAD Risks: Male sex, Obesity, Sedentary lifestyle, Tobacco exposure, Diabetes Mellitus    Healthy Coping:  Healthy Coping Assessed Today: Yes  Emotional response to diabetes: Ready to learn, Concern for health and well-being  Informal Support system:: Spouse  Stage of change: PREPARATION (Decided to change - considering how)  Support resources: Other  Patient Activation Measure Survey Score:      6/20/2011     8:00 AM   MAYANK Score (Last Two)   MAYANK Raw Score 39   Activation Score 56.4   MAYANK Level 3         Care Plan and Education Provided:  Healthy Eating: Consistency in amount and timing of carbohydrate intake, Portion control, Weight Management, and elimination of soda from diet, Monitoring: Frequency of monitoring and Individual glucose targets, and Taking Medication: Action of prescribed medication(s), Side effects of prescribed medication(s), and When to take medication(s)    Geno Montemayor RD, LD, [facility]     Time Spent: 25 minutes  Encounter Type: Individual    Any diabetes medication dose changes were made via the CDE Protocol per the patient's referring provider. A copy of this encounter was shared with the provider.

## 2024-07-01 NOTE — PROGRESS NOTES
Diabetes Self-Management Education & Support    Presents for: Initial Assessment for new diagnosis    Type of Service: Telephone Visit    Audio only visit done, as patient does not have access to audio-visual technology.    Individual visit provided, given no group classes are available for 2 months.     Originating Location (Patient Location): Home  Distant Location (Provider Location): Offsite  Mode of Communication:  Telephone    Telephone Visit Start Time:  1:31p  Telephone Visit End Time (telephone visit stop time): 1:56p    How would patient like to obtain AVS? MyChart      ASSESSMENT:  Jose Manuel comes in for new diagnosis education. He was prescribed Ozempic but never started it - concerns about thyroid cancer as well as report of significant fear of needles. He would prefer an oral med and we discussed the use of metformin at a low dose to start. He is agreeable. He declines interest in group classes, would prefer virtual one on one follow up as he feels he knows a fair amount already. Strong family history of T2DM so his siblings are helping him with this diagnosis.     Patient's most recent   Lab Results   Component Value Date    A1C 6.5 05/13/2024     is meeting goal of <7.0    Diabetes knowledge and skills assessment:   Patient is knowledgeable in diabetes management concepts related to: none, new diagnosis     Continue education with the following diabetes management concepts: Healthy Eating, Being Active, Monitoring, Taking Medication, Problem Solving, Reducing Risks, and Healthy Coping    Based on learning assessment above, most appropriate setting for further diabetes education would be: Group class or Individual setting.      PLAN    Test blood sugar once daily, fasting   Start metformin  mg/day, plan to titrate at follow up [facility] visit as needed     Topics to cover at upcoming visits: Healthy Eating, Being Active, Monitoring, Taking Medication, Problem Solving, Reducing Risks, and Healthy  "Coping    Follow-up: 7/15/24    See Care Plan for co-developed, patient-state behavior change goals.  AVS provided for patient today.    Education Materials Provided:  Understanding Diabetes Booklet, blood sugar log sheet, MyPlate planner, Goals of Diabetes Care, Diabetes Medication information, & [facility] Diabetes Distress handout sent via email       SUBJECTIVE/OBJECTIVE:  Presents for: Initial Assessment for new diagnosis  Accompanied by: Self  Diabetes education in the past 24mo: No  Focus of Visit: Patient Unsure  Diabetes type: Type 2  Date of diagnosis: 5/2024  Disease course: Stable  How confident are you filling out medical forms by yourself:: Not Assessed  Diabetes management related comments/concerns: Not interested in group classes, strong family history of T2DM, wants to know more about meds  Transportation concerns: No  Difficulty affording diabetes medication?: No  Difficulty affording diabetes testing supplies?: No  Other concerns:: None  Cultural Influences/Ethnic Background:  Not  or       Diabetes Symptoms & Complications:  Diabetes Related Symptoms: None  Weight trend: Decreasing  Symptom course: Stable  Disease course: Stable  Complications assessed today?: No    Patient Problem List and Family Medical History reviewed for relevant medical history, current medical status, and diabetes risk factors.    Vitals:  There were no vitals taken for this visit.  Estimated body mass index is 33.69 kg/m  as calculated from the following:    Height as of 5/13/24: 1.657 m (5' 5.25\").    Weight as of 5/13/24: 92.5 kg (204 lb).   Last 3 BP:   BP Readings from Last 3 Encounters:   05/13/24 122/86   04/22/24 122/70   07/19/23 121/81       History   Smoking Status    Every Day    Types: Cigarettes   Smokeless Tobacco    Never       Labs:  Lab Results   Component Value Date    A1C 6.5 05/13/2024     Lab Results   Component Value Date     05/13/2024     04/07/2022     04/03/2021 " "    Lab Results   Component Value Date    LDL 63 05/13/2024    LDL 67 03/01/2021     HDL Cholesterol   Date Value Ref Range Status   03/01/2021 45 >39 mg/dL Final     Direct Measure HDL   Date Value Ref Range Status   05/13/2024 42 >=40 mg/dL Final   ]  GFR Estimate   Date Value Ref Range Status   05/13/2024 68 >60 mL/min/1.73m2 Final   04/03/2021 >60 >60 ml/min/1.73m2 Final     GFR Estimate If Black   Date Value Ref Range Status   04/03/2021 >60 >60 ml/min/1.73m2 Final     Lab Results   Component Value Date    CR 1.17 05/13/2024    CR 0.95 04/03/2021     No results found for: \"MICROALBUMIN\"    Healthy Eating:  Healthy Eating Assessed Today: Yes  Cultural/Orthodoxy diet restrictions?: No  Do you have any food allergies or intolerances?: No  Meal planning/habits: Smaller portions, Low carb  Who cooks/prepares meals for you?: Self, Spouse  Who purchases food in  your home?: Self, Spouse  Meals include: Breakfast  Breakfast: eggs OR ww cereal - Raisin Bran OR granola w/ raisins; 12 oz V8 juice  Lunch: leftovers - smaller portions  Dinner: pizza OR scalloped potatoes, vegetables OR stir padilla \"well balanced\"  Has patient met with a dietitian in the past?: No    Being Active:  Being Active Assessed Today: No    Monitoring:  Monitoring Assessed Today: Yes  Did patient bring glucose meter to appointment? : Yes  Blood Glucose Meter: Accu-chek  Times checking blood sugar at home (number): 1  Times checking blood sugar at home (per): Day    Blood sugars:132, 195 (1 hr after meal), 121, 97, 129, 202 (1 hr after meal), 162, (1 hr after), 119    Taking Medications:  Diabetes Medication(s)       Incretin Mimetic Agents       semaglutide (OZEMPIC) 2 MG/3ML pen Inject 0.25 mg Subcutaneous every 7 days            Taking Medication Assessed Today: No    Problem Solving:  Problem Solving Assessed Today: No              Reducing Risks:  Reducing Risks Assessed Today: Yes  Diabetes Risks: Age over 45 years, Family History, Sedentary " Lifestyle  CAD Risks: Male sex, Obesity, Sedentary lifestyle, Tobacco exposure, Diabetes Mellitus    Healthy Coping:  Healthy Coping Assessed Today: Yes  Emotional response to diabetes: Ready to learn, Concern for health and well-being  Informal Support system:: Spouse  Stage of change: PREPARATION (Decided to change - considering how)  Support resources: Other  Patient Activation Measure Survey Score:      6/20/2011     8:00 AM   MAYANK Score (Last Two)   MAYANK Raw Score 39   Activation Score 56.4   MAYANK Level 3         Care Plan and Education Provided:  Healthy Eating: Consistency in amount and timing of carbohydrate intake, Portion control, Weight Management, and elimination of soda from diet, Monitoring: Frequency of monitoring and Individual glucose targets, and Taking Medication: Action of prescribed medication(s), Side effects of prescribed medication(s), and When to take medication(s)    Geno Montemayor RD, LD, [facility]     Time Spent: 25 minutes  Encounter Type: Individual    Any diabetes medication dose changes were made via the CDE Protocol per the patient's referring provider. A copy of this encounter was shared with the provider.

## 2024-07-09 ENCOUNTER — ANCILLARY PROCEDURE (OUTPATIENT)
Dept: ULTRASOUND IMAGING | Facility: CLINIC | Age: 69
End: 2024-07-09
Attending: FAMILY MEDICINE
Payer: MEDICARE

## 2024-07-09 PROCEDURE — 76536 US EXAM OF HEAD AND NECK: CPT | Mod: TC | Performed by: STUDENT IN AN ORGANIZED HEALTH CARE EDUCATION/TRAINING PROGRAM

## 2024-07-10 DIAGNOSIS — R22.1 MASS OF NECK: Primary | ICD-10-CM

## 2024-07-15 ENCOUNTER — VIRTUAL VISIT (OUTPATIENT)
Dept: EDUCATION SERVICES | Facility: CLINIC | Age: 69
End: 2024-07-15
Payer: MEDICARE

## 2024-07-15 DIAGNOSIS — E11.9 DIABETES MELLITUS (H): Primary | ICD-10-CM

## 2024-07-15 PROCEDURE — G0108 DIAB MANAGE TRN  PER INDIV: HCPCS | Mod: 95 | Performed by: DIETITIAN, REGISTERED

## 2024-07-15 NOTE — LETTER
7/15/2024         RE: Mayur Sheth  [address]  [facility] [address]        Dear Colleague,    Thank you for referring your patient, Mayur Sheth, to the [facility]. Please see a copy of my visit note below.    Diabetes Self-Management Education & Support    Presents for:      Type of service:  Video Visit      ASSESSMENT:  Follow up visit for diabetes education.  He was prescribed Ozempic but never started it (due to fear of needles as well as concern about thyroid cancer). He has also stopped taking metformin now - had side effects. Shares that he wants to continue focusing on lifestyle modification and would rather not take any medication. Reports that he has been testing BS 1x/d and most of FBS have been at goal/under 130. Feels he knows a fair amount about diabetes management already.    Reports good support from spouse, family     Consumes 3 meals and 1-2 snacks   Food recall:  BF:corn beef hash, 2 eggs, 2 toasts (multi grain)  L:pizza, crazy bread (with marinara sauce)  D:pizza, blizzard  Snacks: corn chips  4 -5 cups of water/other non - sugary beverages. Has cut back on soda and limiting it to once every other day    No planned activity. [employer] - work is very active      Patient's most recent   Lab Results   Component Value Date    A1C 6.5 05/13/2024     is meeting goal of <7.0    PLAN    Continue to test BG 1x/d: fasting/2 hours after a meal.  Your blood sugars goals are:  Before eating (fasting): 80 - 130 mg/dL  2-hours after meals (post-prandial): less than 180 mg/dL     Continue to watch your carb intake and limit to 45-60 grams of carbs per meal. 15-30 grams for snacks.    Regular physical activity as able/safe    Topics to cover at upcoming visits: Problem Solving and Reducing Risks    Follow-up: 9/17    See Care Plan for co-developed, patient-state behavior change goals.  AVS provided for patient today.    Education Materials  "Provided:  N/a    SUBJECTIVE/OBJECTIVE:  Diabetes education in the past 24mo: Yes  Diabetes type: Type 2  Disease course: Stable  How confident are you filling out medical forms by yourself:: Extremely  Cultural Influences/Ethnic Background:  Not  or     Diabetes Symptoms & Complications:  Diabetes Related Symptoms: Neuropathy, Polyuria (increased urination)  Weight trend: Decreasing  Symptom course: Stable  Disease course: Stable       Patient Problem List and Family Medical History reviewed for relevant medical history, current medical status, and diabetes risk factors.    Vitals:  There were no vitals taken for this visit.  Estimated body mass index is 33.69 kg/m  as calculated from the following:    Height as of 5/13/24: 1.657 m (5' 5.25\").    Weight as of 5/13/24: 92.5 kg (204 lb).   Last 3 BP:   BP Readings from Last 3 Encounters:   05/13/24 122/86   04/22/24 122/70   07/19/23 121/81       History   Smoking Status     Every Day     Types: Cigarettes   Smokeless Tobacco     Never       Labs:  Lab Results   Component Value Date    A1C 6.5 05/13/2024     Lab Results   Component Value Date     05/13/2024     04/07/2022     04/03/2021     Lab Results   Component Value Date    LDL 63 05/13/2024    LDL 67 03/01/2021     HDL Cholesterol   Date Value Ref Range Status   03/01/2021 45 >39 mg/dL Final     Direct Measure HDL   Date Value Ref Range Status   05/13/2024 42 >=40 mg/dL Final   ]  GFR Estimate   Date Value Ref Range Status   05/13/2024 68 >60 mL/min/1.73m2 Final   04/03/2021 >60 >60 ml/min/1.73m2 Final     GFR Estimate If Black   Date Value Ref Range Status   04/03/2021 >60 >60 ml/min/1.73m2 Final     Lab Results   Component Value Date    CR 1.17 05/13/2024    CR 0.95 04/03/2021     No results found for: \"MICROALBUMIN\"    Healthy Eating:  Cultural/Congregation diet restrictions?: Yes  How many times a week on average do you eat food made away from home (restaurant/take-out)?: " 2  Meals include: Breakfast, Lunch, Dinner  Beverages: Water, Tea, Coffee, Other (see Comments)  Please elaborate:: Sparkling water 0 calorie    Being Active:  Barrier to exercise: Time    Monitoring:  Blood Glucose Meter: Accu-chek  Times checking blood sugar at home (number): 1  Times checking blood sugar at home (per): Day    Taking Medications:  Diabetes Medication(s)       Biguanides       metFORMIN (GLUCOPHAGE XR) 500 MG 24 hr tablet Take 1 tablet (500 mg) by mouth daily (with dinner)       Incretin Mimetic Agents       semaglutide (OZEMPIC) 2 MG/3ML pen Inject 0.25 mg Subcutaneous every 7 days            Current Treatments: Oral Medication (taken by mouth)    Reducing Risks:  Has dilated eye exam at least once a year?: Yes  Sees dentist every 6 months?: Yes  Feet checked by healthcare provider in the last year?: No    Healthy Coping:  Informal Support system:: Friends, Spouse, Other  Patient Activation Measure Survey Score:      6/20/2011     8:00 AM   MAYAKN Score (Last Two)   MAYANK Raw Score 39   Activation Score 56.4   MAYANK Level 3       Time Spent: 45 minutes  Encounter Type: Individual    Any diabetes medication dose changes were made via the CDE Protocol per the patient's referring provider. A copy of this encounter was shared with the provider.

## 2024-07-16 NOTE — PATIENT INSTRUCTIONS
Patient instructions:    Continue to test BG 1x/d: fasting/2 hours after a meal.  Your blood sugars goals are:  Before eating (fasting): 80 - 130 mg/dL  2-hours after meals (post-prandial): less than 180 mg/dL     Continue to watch your carb intake and limit to 45-60 grams of carbs per meal. 15-30 grams for snacks.    Regular physical activity as able/safe    Call with any questions.    Thanks!   Claudia Park RDN, JASS, [facility]  Diabetes Care and Education  [phone] (Triage)

## 2024-07-27 DIAGNOSIS — Z71.6 ENCOUNTER FOR SMOKING CESSATION COUNSELING: ICD-10-CM

## 2024-07-29 RX ORDER — VARENICLINE TARTRATE 1 MG/1
1 TABLET, FILM COATED ORAL 2 TIMES DAILY
Qty: 62 TABLET | Refills: 0 | Status: SHIPPED | OUTPATIENT
Start: 2024-07-29 | End: 2024-09-18

## 2024-09-17 ENCOUNTER — VIRTUAL VISIT (OUTPATIENT)
Dept: EDUCATION SERVICES | Facility: CLINIC | Age: 69
End: 2024-09-17
Payer: OTHER GOVERNMENT

## 2024-09-17 DIAGNOSIS — E11.9 DIABETES MELLITUS (H): Primary | ICD-10-CM

## 2024-09-17 PROCEDURE — G0108 DIAB MANAGE TRN  PER INDIV: HCPCS | Mod: 93 | Performed by: FAMILY MEDICINE

## 2024-09-17 NOTE — PROGRESS NOTES
Diabetes Self-Management Education & Support    Presents for:      Type of Service: Telephone Visit    ASSESSMENT:    Follow up visit for diabetes education.  Good support from spouse, family   Per the limited SMBG glycemic control seems to be trending in the right direction.    He was prescribed Ozempic but never started it (due to fear of needles as well as concern about thyroid cancer) - per pt, PCP is aware of that.  Also stopped taking metformin - due to side effects. He just wanted to continue focusing on lifestyle modification and not take any medication if possible. Reports that he has lost 3-4 pounds in the past few weeks.     SMBG:  Tests 2-3x/wk  BG  - :  FB,139, 134, 112, 104, 127, 126, 118, 129, 116, 119  BG throughout the day:141, 143      Consumes 3 meals and 1-2 snacks   Food recall:  BF: cereal with milk   L:cheese burger, fries, beer  D:skipped  Snacks: corn chips, animal crackers  Ice cream bar, 2x/wk  At least 6 cups of water (sparkling water). Has been cut back on soda and limits it to 2x/wk  We reviewed dietary guidelines, CHO foods and serving sizes, healthy Vs unhealthy carbs, exercising balance and moderation, the need to reassess for meds at the next A1c check     No planned activity. [employer] - work is very active       Patient's most recent   Lab Results   Component Value Date    A1C 6.5 2024     is not meeting goal of <7.0    PLAN    Pt to call to schedule a diabetes follow up/A1c draw.  To continue focusing on eating healthfully and regular activity    Topics to cover at upcoming visits: Problem Solving and Reducing Risks    Follow-up: 3 months (or sooner if concerns)    See Care Plan for co-developed, patient-state behavior change goals.  AVS provided for patient today.    Education Materials Provided:  No new materials provided today    SUBJECTIVE/OBJECTIVE:     Cultural Influences/Ethnic Background:  Not  or       Patient Problem List and Family  "Medical History reviewed for relevant medical history, current medical status, and diabetes risk factors.    Vitals:  There were no vitals taken for this visit.  Estimated body mass index is 33.69 kg/m  as calculated from the following:    Height as of 5/13/24: 1.657 m (5' 5.25\").    Weight as of 5/13/24: 92.5 kg (204 lb).   Last 3 BP:   BP Readings from Last 3 Encounters:   05/13/24 122/86   04/22/24 122/70   07/19/23 121/81       History   Smoking Status    Every Day    Types: Cigarettes   Smokeless Tobacco    Never       Labs:  Lab Results   Component Value Date    A1C 6.5 05/13/2024     Lab Results   Component Value Date     05/13/2024     04/07/2022     04/03/2021     Lab Results   Component Value Date    LDL 63 05/13/2024    LDL 67 03/01/2021     HDL Cholesterol   Date Value Ref Range Status   03/01/2021 45 >39 mg/dL Final     Direct Measure HDL   Date Value Ref Range Status   05/13/2024 42 >=40 mg/dL Final   ]  GFR Estimate   Date Value Ref Range Status   05/13/2024 68 >60 mL/min/1.73m2 Final   04/03/2021 >60 >60 ml/min/1.73m2 Final     GFR Estimate If Black   Date Value Ref Range Status   04/03/2021 >60 >60 ml/min/1.73m2 Final     Lab Results   Component Value Date    CR 1.17 05/13/2024    CR 0.95 04/03/2021     No results found for: \"MICROALBUMIN\"    Taking Medications:  Diabetes Medication(s)       Biguanides       metFORMIN (GLUCOPHAGE XR) 500 MG 24 hr tablet Take 1 tablet (500 mg) by mouth daily (with dinner)       Incretin Mimetic Agents       semaglutide (OZEMPIC) 2 MG/3ML pen Inject 0.25 mg Subcutaneous every 7 days             Patient Activation Measure Survey Score:      6/20/2011     8:00 AM   MAYANK Score (Last Two)   MAYANK Raw Score 39   Activation Score 56.4   MAYANK Level 3       Time Spent: 50 minutes  Encounter Type: Individual    Any diabetes medication dose changes were made via the CDE Protocol per the patient's referring provider. A copy of this encounter was shared with the " provider.

## 2024-09-17 NOTE — LETTER
2024         RE: Mayur Sheth  [address]  [facility] [address]        Dear Colleague,    Thank you for referring your patient, aMyur Sheth, to the [facility]. Please see a copy of my visit note below.    Diabetes Self-Management Education & Support    Presents for:      Type of Service: Telephone Visit    ASSESSMENT:    Follow up visit for diabetes education.  Good support from spouse, family   Per the limited SMBG glycemic control seems to be trending in the right direction.    He was prescribed Ozempic but never started it (due to fear of needles as well as concern about thyroid cancer) - per pt, PCP is aware of that.  Also stopped taking metformin - due to side effects. He just wanted to continue focusing on lifestyle modification and not take any medication if possible. Reports that he has lost 3-4 pounds in the past few weeks.     SMBG:  Tests 2-3x/wk  BG  - :  FB,139, 134, 112, 104, 127, 126, 118, 129, 116, 119  BG throughout the day:141, 143      Consumes 3 meals and 1-2 snacks   Food recall:  BF: cereal with milk   L:cheese burger, fries, beer  D:skipped  Snacks: corn chips, animal crackers  Ice cream bar, 2x/wk  At least 6 cups of water (sparkling water). Has been cut back on soda and limits it to 2x/wk  We reviewed dietary guidelines, CHO foods and serving sizes, healthy Vs unhealthy carbs, exercising balance and moderation, the need to reassess for meds at the next A1c check     No planned activity. [employer] - work is very active       Patient's most recent   Lab Results   Component Value Date    A1C 6.5 2024     is not meeting goal of <7.0    PLAN    Pt to call to schedule a diabetes follow up/A1c draw.  To continue focusing on eating healthfully and regular activity    Topics to cover at upcoming visits: Problem Solving and Reducing Risks    Follow-up: 3 months (or sooner if concerns)    See Care Plan for co-developed,  "patient-state behavior change goals.  AVS provided for patient today.    Education Materials Provided:  No new materials provided today    SUBJECTIVE/OBJECTIVE:     Cultural Influences/Ethnic Background:  Not  or       Patient Problem List and Family Medical History reviewed for relevant medical history, current medical status, and diabetes risk factors.    Vitals:  There were no vitals taken for this visit.  Estimated body mass index is 33.69 kg/m  as calculated from the following:    Height as of 5/13/24: 1.657 m (5' 5.25\").    Weight as of 5/13/24: 92.5 kg (204 lb).   Last 3 BP:   BP Readings from Last 3 Encounters:   05/13/24 122/86   04/22/24 122/70   07/19/23 121/81       History   Smoking Status     Every Day     Types: Cigarettes   Smokeless Tobacco     Never       Labs:  Lab Results   Component Value Date    A1C 6.5 05/13/2024     Lab Results   Component Value Date     05/13/2024     04/07/2022     04/03/2021     Lab Results   Component Value Date    LDL 63 05/13/2024    LDL 67 03/01/2021     HDL Cholesterol   Date Value Ref Range Status   03/01/2021 45 >39 mg/dL Final     Direct Measure HDL   Date Value Ref Range Status   05/13/2024 42 >=40 mg/dL Final   ]  GFR Estimate   Date Value Ref Range Status   05/13/2024 68 >60 mL/min/1.73m2 Final   04/03/2021 >60 >60 ml/min/1.73m2 Final     GFR Estimate If Black   Date Value Ref Range Status   04/03/2021 >60 >60 ml/min/1.73m2 Final     Lab Results   Component Value Date    CR 1.17 05/13/2024    CR 0.95 04/03/2021     No results found for: \"MICROALBUMIN\"    Taking Medications:  Diabetes Medication(s)       Biguanides       metFORMIN (GLUCOPHAGE XR) 500 MG 24 hr tablet Take 1 tablet (500 mg) by mouth daily (with dinner)       Incretin Mimetic Agents       semaglutide (OZEMPIC) 2 MG/3ML pen Inject 0.25 mg Subcutaneous every 7 days             Patient Activation Measure Survey Score:      6/20/2011     8:00 AM   MAYANK Score (Last Two) "   MAYANK Raw Score 39   Activation Score 56.4   MAYANK Level 3       Time Spent: 50 minutes  Encounter Type: Individual    Any diabetes medication dose changes were made via the CDE Protocol per the patient's referring provider. A copy of this encounter was shared with the provider.

## 2024-09-18 ENCOUNTER — OFFICE VISIT (OUTPATIENT)
Dept: FAMILY MEDICINE | Facility: CLINIC | Age: 69
End: 2024-09-18
Payer: MEDICARE

## 2024-09-18 VITALS
TEMPERATURE: 98.5 F | SYSTOLIC BLOOD PRESSURE: 127 MMHG | WEIGHT: 204.2 LBS | DIASTOLIC BLOOD PRESSURE: 82 MMHG | BODY MASS INDEX: 34.02 KG/M2 | HEIGHT: 65 IN | RESPIRATION RATE: 18 BRPM | HEART RATE: 81 BPM | OXYGEN SATURATION: 98 %

## 2024-09-18 DIAGNOSIS — R22.1 NECK MASS: ICD-10-CM

## 2024-09-18 DIAGNOSIS — E11.9 TYPE 2 DIABETES MELLITUS WITHOUT COMPLICATION, WITHOUT LONG-TERM CURRENT USE OF INSULIN (H): Primary | ICD-10-CM

## 2024-09-18 PROBLEM — F17.200 SMOKER: Status: RESOLVED | Noted: 2020-02-24 | Resolved: 2024-09-18

## 2024-09-18 LAB
CREAT UR-MCNC: 57 MG/DL
EST. AVERAGE GLUCOSE BLD GHB EST-MCNC: 128 MG/DL
HBA1C MFR BLD: 6.1 % (ref 0–5.6)
MICROALBUMIN UR-MCNC: <12 MG/L
MICROALBUMIN/CREAT UR: NORMAL MG/G{CREAT}

## 2024-09-18 PROCEDURE — 82043 UR ALBUMIN QUANTITATIVE: CPT

## 2024-09-18 PROCEDURE — 83036 HEMOGLOBIN GLYCOSYLATED A1C: CPT

## 2024-09-18 PROCEDURE — 99214 OFFICE O/P EST MOD 30 MIN: CPT

## 2024-09-18 PROCEDURE — 99207 PR FOOT EXAM NO CHARGE: CPT

## 2024-09-18 PROCEDURE — 82570 ASSAY OF URINE CREATININE: CPT

## 2024-09-18 PROCEDURE — 36415 COLL VENOUS BLD VENIPUNCTURE: CPT

## 2024-09-18 NOTE — PROGRESS NOTES
Assessment & Plan     Type 2 diabetes mellitus without complication, without long-term current use of insulin (H)  - Albumin Random Urine Quantitative with Creat Ratio  - HEMOGLOBIN A1C   Repeat in 3 mo   -Continue lifestyle changes as discussed   - FOOT EXAM    Neck mass  Unchanged   - Education provided, results reviewed      Follow up in 3 mo for A1c check       Subjective   Jose Manuel is a 69 year old, presenting for the following health issues:  Diabetes (Follow up on his dm /States that he stopped the metformin 2 days after starting it because it made him sick. Reported this to his diabetic educator. )    PT reports for DM follow up. Started on Metformin/Semaglutide in Jul/May 2024. Reports he did not fill Semaglutide due to cost and took metformin twice and discontinued due to abd discomfort. Has made multiple dietary changes with DM educator.     Pt additionally concerned for mass to R neck. Reports this waxes and wanes occasionally. Non tender. Had US in Jul- these results were reviewed to include recommendation for tissue sampling. Pt expressly declines but agrees to follow up if mass changes. Education provided.         9/18/2024     8:49 AM   Additional Questions   Roomed by mila     History of Present Illness       Diabetes:   He presents for follow up of diabetes.  He is checking home blood glucose a few times a week.   He checks blood glucose before meals.  Blood glucose is never over 200 and never under 70. He is aware of hypoglycemia symptoms including other.   He is concerned about other.   He is having numbness in feet.  The patient has not had a diabetic eye exam in the last 12 months.          Hyperlipidemia:  He presents for follow up of hyperlipidemia.   He is taking medication to lower cholesterol. He is not having myalgia or other side effects to statin medications.    Hypertension: He presents for follow up of hypertension.  He does not check blood pressure  regularly outside of the clinic.  "Outpatient blood pressures have not been over 140/90. He does not follow a low salt diet.     He eats 0-1 servings of fruits and vegetables daily.He consumes 1 sweetened beverage(s) daily.He exercises with enough effort to increase his heart rate 9 or less minutes per day.  He exercises with enough effort to increase his heart rate 3 or less days per week.   He is taking medications regularly.         Review of Systems  Constitutional, HEENT, cardiovascular, pulmonary, gi and gu systems are negative, except as otherwise noted.      Objective    /82   Pulse 81   Temp 98.5  F (36.9  C) (Temporal)   Resp 18   Ht 1.657 m (5' 5.24\")   Wt 92.6 kg (204 lb 3.2 oz)   SpO2 98%   BMI 33.74 kg/m    Body mass index is 33.74 kg/m .  Physical Exam   GENERAL: healthy, alert and no distress  EYES: Eyes grossly normal to inspection, PERRL and conjunctivae and sclerae normal  Neck: No visible JVD or lymphadenopathy   RESP: symmetrical rise in chest   CV: No peripheral edema notable   MS: no gross musculoskeletal defects noted  SKIN: no suspicious lesions or rashes  PSYCH: mentation appears normal, affect normal/bright    Diabetic foot exam: normal DP and PT pulses, no trophic changes or ulcerative lesions, and normal sensory exam             Signed Electronically by: MARIJA Bell CNP  "

## 2024-12-22 ENCOUNTER — HEALTH MAINTENANCE LETTER (OUTPATIENT)
Age: 69
End: 2024-12-22

## 2025-04-12 ENCOUNTER — HEALTH MAINTENANCE LETTER (OUTPATIENT)
Age: 70
End: 2025-04-12

## 2025-04-14 ENCOUNTER — TELEPHONE (OUTPATIENT)
Dept: FAMILY MEDICINE | Facility: CLINIC | Age: 70
End: 2025-04-14
Payer: MEDICARE

## 2025-04-14 ENCOUNTER — PATIENT OUTREACH (OUTPATIENT)
Dept: CARE COORDINATION | Facility: CLINIC | Age: 70
End: 2025-04-14
Payer: MEDICARE

## 2025-04-14 NOTE — TELEPHONE ENCOUNTER
Patient Quality Outreach    Patient is due for the following:   Diabetes -  A1C, LDL (Fasting), eGFR, uACR, Eye Exam, Microalbumin, Statin, Diabetic Follow-Up Visit, and Foot Exam  Physical Annual Wellness Visit      Topic Date Due    Pneumococcal Vaccine (2 of 2 - PCV) 05/10/2017    Zoster (Shingles) Vaccine (2 of 3) 02/14/2018    Flu Vaccine (1) 09/01/2024    COVID-19 Vaccine (7 - 2024-25 season) 09/01/2024     Chronic Opioid Use -  Treatment Agreement (CSA), Urine Drug Screen, NATY-7, and PHQ-9  Lung Cancer Screening     Action(s) Taken:   Schedule a Annual Wellness Visit    Type of outreach:    Sent MeSixty message.    Questions for provider review:    None         Alyx Sinha CMA  Chart routed to Care Team.

## 2025-05-03 ENCOUNTER — HEALTH MAINTENANCE LETTER (OUTPATIENT)
Age: 70
End: 2025-05-03

## 2025-05-14 ENCOUNTER — OFFICE VISIT (OUTPATIENT)
Dept: FAMILY MEDICINE | Facility: CLINIC | Age: 70
End: 2025-05-14
Payer: MEDICARE

## 2025-05-14 VITALS
TEMPERATURE: 97.6 F | BODY MASS INDEX: 33.18 KG/M2 | WEIGHT: 199.13 LBS | DIASTOLIC BLOOD PRESSURE: 70 MMHG | RESPIRATION RATE: 16 BRPM | HEART RATE: 97 BPM | OXYGEN SATURATION: 95 % | HEIGHT: 65 IN | SYSTOLIC BLOOD PRESSURE: 128 MMHG

## 2025-05-14 DIAGNOSIS — R53.83 FATIGUE, UNSPECIFIED TYPE: ICD-10-CM

## 2025-05-14 DIAGNOSIS — Z72.0 TOBACCO ABUSE: ICD-10-CM

## 2025-05-14 DIAGNOSIS — Z00.00 ENCOUNTER FOR MEDICARE ANNUAL WELLNESS EXAM: Primary | ICD-10-CM

## 2025-05-14 DIAGNOSIS — Z86.0100 HISTORY OF COLONIC POLYPS: ICD-10-CM

## 2025-05-14 DIAGNOSIS — E78.5 HYPERLIPIDEMIA LDL GOAL <100: ICD-10-CM

## 2025-05-14 DIAGNOSIS — Z87.891 HISTORY OF TOBACCO USE, PRESENTING HAZARDS TO HEALTH: ICD-10-CM

## 2025-05-14 DIAGNOSIS — I10 ESSENTIAL HYPERTENSION WITH GOAL BLOOD PRESSURE LESS THAN 140/90: ICD-10-CM

## 2025-05-14 DIAGNOSIS — Z12.5 SCREENING FOR PROSTATE CANCER: ICD-10-CM

## 2025-05-14 DIAGNOSIS — R73.01 IMPAIRED FASTING GLUCOSE: ICD-10-CM

## 2025-05-14 PROBLEM — E11.9 TYPE 2 DIABETES MELLITUS WITHOUT COMPLICATION, WITHOUT LONG-TERM CURRENT USE OF INSULIN (H): Status: RESOLVED | Noted: 2024-09-18 | Resolved: 2025-05-14

## 2025-05-14 PROBLEM — E66.01 MORBID OBESITY (H): Status: RESOLVED | Noted: 2018-09-26 | Resolved: 2025-05-14

## 2025-05-14 LAB
ALBUMIN SERPL BCG-MCNC: 4.4 G/DL (ref 3.5–5.2)
ALP SERPL-CCNC: 93 U/L (ref 40–150)
ALT SERPL W P-5'-P-CCNC: 32 U/L (ref 0–70)
ANION GAP SERPL CALCULATED.3IONS-SCNC: 10 MMOL/L (ref 7–15)
AST SERPL W P-5'-P-CCNC: 31 U/L (ref 0–45)
BASOPHILS # BLD AUTO: 0 10E3/UL (ref 0–0.2)
BASOPHILS NFR BLD AUTO: 0 %
BILIRUB SERPL-MCNC: 0.4 MG/DL
BUN SERPL-MCNC: 14.5 MG/DL (ref 8–23)
CALCIUM SERPL-MCNC: 10.2 MG/DL (ref 8.8–10.4)
CHLORIDE SERPL-SCNC: 97 MMOL/L (ref 98–107)
CHOLEST SERPL-MCNC: 166 MG/DL
CREAT SERPL-MCNC: 1.14 MG/DL (ref 0.67–1.17)
EGFRCR SERPLBLD CKD-EPI 2021: 70 ML/MIN/1.73M2
EOSINOPHIL # BLD AUTO: 0.3 10E3/UL (ref 0–0.7)
EOSINOPHIL NFR BLD AUTO: 3 %
ERYTHROCYTE [DISTWIDTH] IN BLOOD BY AUTOMATED COUNT: 12.5 % (ref 10–15)
EST. AVERAGE GLUCOSE BLD GHB EST-MCNC: 148 MG/DL
FASTING STATUS PATIENT QL REPORTED: NO
FASTING STATUS PATIENT QL REPORTED: NO
GLUCOSE SERPL-MCNC: 167 MG/DL (ref 70–99)
HBA1C MFR BLD: 6.8 % (ref 0–5.6)
HCO3 SERPL-SCNC: 31 MMOL/L (ref 22–29)
HCT VFR BLD AUTO: 55 % (ref 40–53)
HDLC SERPL-MCNC: 39 MG/DL
HGB BLD-MCNC: 18.6 G/DL (ref 13.3–17.7)
IMM GRANULOCYTES # BLD: 0 10E3/UL
IMM GRANULOCYTES NFR BLD: 0 %
LDLC SERPL CALC-MCNC: 74 MG/DL
LYMPHOCYTES # BLD AUTO: 3.1 10E3/UL (ref 0.8–5.3)
LYMPHOCYTES NFR BLD AUTO: 26 %
MCH RBC QN AUTO: 30.4 PG (ref 26.5–33)
MCHC RBC AUTO-ENTMCNC: 33.8 G/DL (ref 31.5–36.5)
MCV RBC AUTO: 90 FL (ref 78–100)
MONOCYTES # BLD AUTO: 1.1 10E3/UL (ref 0–1.3)
MONOCYTES NFR BLD AUTO: 9 %
NEUTROPHILS # BLD AUTO: 7.5 10E3/UL (ref 1.6–8.3)
NEUTROPHILS NFR BLD AUTO: 62 %
NONHDLC SERPL-MCNC: 127 MG/DL
PLATELET # BLD AUTO: 320 10E3/UL (ref 150–450)
POTASSIUM SERPL-SCNC: 4.1 MMOL/L (ref 3.4–5.3)
PROT SERPL-MCNC: 7.3 G/DL (ref 6.4–8.3)
PSA SERPL DL<=0.01 NG/ML-MCNC: 10.7 NG/ML (ref 0–4.5)
RBC # BLD AUTO: 6.12 10E6/UL (ref 4.4–5.9)
SODIUM SERPL-SCNC: 138 MMOL/L (ref 135–145)
TRIGL SERPL-MCNC: 265 MG/DL
TSH SERPL DL<=0.005 MIU/L-ACNC: 1.74 UIU/ML (ref 0.3–4.2)
WBC # BLD AUTO: 12.1 10E3/UL (ref 4–11)

## 2025-05-14 PROCEDURE — 80053 COMPREHEN METABOLIC PANEL: CPT | Performed by: FAMILY MEDICINE

## 2025-05-14 PROCEDURE — 99214 OFFICE O/P EST MOD 30 MIN: CPT | Mod: 25 | Performed by: FAMILY MEDICINE

## 2025-05-14 PROCEDURE — G0103 PSA SCREENING: HCPCS | Performed by: FAMILY MEDICINE

## 2025-05-14 PROCEDURE — 3074F SYST BP LT 130 MM HG: CPT | Performed by: FAMILY MEDICINE

## 2025-05-14 PROCEDURE — 3078F DIAST BP <80 MM HG: CPT | Performed by: FAMILY MEDICINE

## 2025-05-14 PROCEDURE — 85025 COMPLETE CBC W/AUTO DIFF WBC: CPT | Performed by: FAMILY MEDICINE

## 2025-05-14 PROCEDURE — G0439 PPPS, SUBSEQ VISIT: HCPCS | Performed by: FAMILY MEDICINE

## 2025-05-14 PROCEDURE — 80061 LIPID PANEL: CPT | Performed by: FAMILY MEDICINE

## 2025-05-14 PROCEDURE — 36415 COLL VENOUS BLD VENIPUNCTURE: CPT | Performed by: FAMILY MEDICINE

## 2025-05-14 PROCEDURE — 1125F AMNT PAIN NOTED PAIN PRSNT: CPT | Performed by: FAMILY MEDICINE

## 2025-05-14 PROCEDURE — 83036 HEMOGLOBIN GLYCOSYLATED A1C: CPT | Performed by: FAMILY MEDICINE

## 2025-05-14 PROCEDURE — 84443 ASSAY THYROID STIM HORMONE: CPT | Performed by: FAMILY MEDICINE

## 2025-05-14 RX ORDER — AMLODIPINE BESYLATE 5 MG/1
5 TABLET ORAL DAILY
Qty: 90 TABLET | Refills: 3 | Status: SHIPPED | OUTPATIENT
Start: 2025-05-14

## 2025-05-14 RX ORDER — TRIAMTERENE AND HYDROCHLOROTHIAZIDE 75; 50 MG/1; MG/1
1 TABLET ORAL DAILY
Qty: 90 TABLET | Refills: 3 | Status: SHIPPED | OUTPATIENT
Start: 2025-05-14

## 2025-05-14 RX ORDER — PRAVASTATIN SODIUM 80 MG/1
80 TABLET ORAL DAILY
Qty: 90 TABLET | Refills: 3 | Status: SHIPPED | OUTPATIENT
Start: 2025-05-14

## 2025-05-14 RX ORDER — VARENICLINE TARTRATE 1 MG/1
1 TABLET, FILM COATED ORAL 2 TIMES DAILY
Qty: 60 TABLET | Refills: 2 | Status: SHIPPED | OUTPATIENT
Start: 2025-05-14

## 2025-05-14 SDOH — HEALTH STABILITY: PHYSICAL HEALTH: ON AVERAGE, HOW MANY DAYS PER WEEK DO YOU ENGAGE IN MODERATE TO STRENUOUS EXERCISE (LIKE A BRISK WALK)?: 1 DAY

## 2025-05-14 ASSESSMENT — PAIN SCALES - GENERAL: PAINLEVEL_OUTOF10: MILD PAIN (3)

## 2025-05-14 ASSESSMENT — SOCIAL DETERMINANTS OF HEALTH (SDOH): HOW OFTEN DO YOU GET TOGETHER WITH FRIENDS OR RELATIVES?: TWICE A WEEK

## 2025-05-14 NOTE — PROGRESS NOTES
"Pre[facility]ive Care Visit  [facility]  May 14, 2025      Assessment & Plan     (Z00.00) Encounter for Medicare annual wellness exam  (primary encounter diagnosis)  Comment: overall patient stable  Plan: keep working on healthy diet/exercise and wt loss     (Z87.891) History of tobacco use, presenting hazards to health  Comment: patient to call and schedule CT  Plan: CT Chest Lung Cancer Screen Low Dose Without             (I10) Essential hypertension with goal blood pressure less than 140/90  Comment: blood pressure okay   Plan: amLODIPine (NORVASC) 5 MG tablet,         triamterene-HCTZ (MAXZIDE) 75-50 MG tablet        Refill meds     (E78.5) Hyperlipidemia LDL goal <100  Comment: refill med, recheck labs   Plan: Lipid panel reflex to direct LDL Non-fasting,         pravastatin (PRAVACHOL) 80 MG tablet,         Comprehensive metabolic panel             (Z72.0) Tobacco abuse  Comment: this worked in past  Plan: varenicline (CHANTIX) 1 MG tablet        Smoking cessation     (Z12.5) Screening for prostate cancer  Comment: psa   Plan: Prostate Specific Antigen Screen             (R73.01) Impaired fasting glucose  Comment: check   Plan: HEMOGLOBIN A1C             (R53.83) Fatigue, unspecified type  Comment: check   Plan: TSH with free T4 reflex, CBC with Platelets &         Differential             (Z86.0100) History of colonic polyps  Comment: up to date on colonoscopy   Plan: as above             Nicotine/Tobacco Cessation  He reports that he has been smoking cigarettes. He started smoking about 48 years ago. He has a 45 pack-year smoking history. He has been exposed to tobacco smoke. He has never used smokeless tobacco.  Nicotine/Tobacco Cessation Plan  Pharmacotherapies : varenicline (Chantix)      BMI  Estimated body mass index is 32.9 kg/m  as calculated from the following:    Height as of this encounter: 1.657 m (5' 5.24\").    Weight as of this encounter: " 90.3 kg (199 lb 2 oz).   Weight management plan: Discussed healthy diet and exercise guidelines    Counseling  Appropriate preventive services were addressed with this patient via screening, questionnaire, or discussion as appropriate for fall prevention, nutrition, physical activity, Tobacco-use cessation, social engagement, weight loss and cognition.  Checklist reviewing preventive services available has been given to the patient.  Reviewed patient's diet, addressing concerns and/or questions.   He is at risk for lack of exercise and has been provided with information to increase physical activity for the benefit of his well-being.           Quintin Richard is a 70 year old, presenting for the following:  Wellness Visit (Non-Fasting)        5/14/2025     8:54 AM   Additional Questions   Roomed by Norma Wells         Via the Health Maintenance questionnaire, the patient has reported the following services have been completed -Eye Exam: prefer not to answer 2028-01-10, this information has been sent to the abstraction team.    HPI  Concerned about hemoglobin a1c and K level    Low in past    Eating bananas and oranges    Skipped beats some    No chest/ arm/ neck pain    When active, okay    Works on cars,works on buildings    No official exercise    Highest wt 230ish in the last 10 years    No diab meds    Mostly avoid sugar sodas    Some decreased stream urination but not bad    Once at night to urinate    Up to date colonoscopy        Advance Care Planning    Discussed advance care planning with patient; however, patient declined at this time.        5/14/2025   General Health   How would you rate your overall physical health? Good   Feel stress (tense, anxious, or unable to sleep) Only a little   (!) STRESS CONCERN      5/14/2025   Nutrition   Diet: Diabetic         5/14/2025   Exercise   Days per week of moderate/strenous exercise 1 day   (!) EXERCISE CONCERN      5/14/2025   Social Factors   Frequency of  gathering with friends or relatives Twice a week   Worry food won't last until get money to buy more No   Food not last or not have enough money for food? No   Do you have housing? (Housing is defined as stable permanent housing and does not include staying outside in a car, in a tent, in an abandoned building, in an overnight shelter, or couch-surfing.) Yes   Are you worried about losing your housing? No   Lack of transportation? No   Unable to get utilities (heat,electricity)? No         5/14/2025   Fall Risk   Fallen 2 or more times in the past year? Yes   Trouble with walking or balance? No   Gait Speed Test Interpretation Less than or equal to 5.00 seconds - PASS           5/14/2025   Activities of Daily Living- Home Safety   Needs help with the following daily activites None of the above   Safety concerns in the home None of the above         5/14/2025   Dental   Dentist two times every year? Yes         5/14/2025   Hearing Screening   Hearing concerns? None of the above         5/14/2025   Driving Risk Screening   Patient/family members have concerns about driving No         5/14/2025   General Alertness/Fatigue Screening   Have you been more tired than usual lately? No         5/14/2025   Urinary Incontinence Screening   Bothered by leaking urine in past 6 months No         Today's PHQ-2 Score:       5/14/2025     8:46 AM   PHQ-2 ( 1999 Pfizer)   Q1: Little interest or pleasure in doing things 0   Q2: Feeling down, depressed or hopeless 0   PHQ-2 Score 0    Q1: Little interest or pleasure in doing things Not at all   Q2: Feeling down, depressed or hopeless Not at all   PHQ-2 Score 0       Patient-reported           5/14/2025   Substance Use   Alcohol more than 3/day or more than 7/wk No   Do you have a current opioid prescription? No   How severe/bad is pain from 1 to 10? 3/10   Do you use any other substances recreationally? (!) CANNABIS PRODUCTS     Social History     Tobacco Use    Smoking status: Former      Current packs/day: 0.00     Average packs/day: 1 pack/day for 45.0 years (45.0 ttl pk-yrs)     Types: Cigarettes     Start date: 3/7/1977     Quit date: 3/7/2022     Years since quitting: 3.1     Passive exposure: Current    Smokeless tobacco: Never    Tobacco comments:     requit, uses Chantix successfully   Vaping Use    Vaping status: Never Used   Substance Use Topics    Alcohol use: Yes     Alcohol/week: 6.0 standard drinks of alcohol     Comment: 5 beers per month     Drug use: No       Last PSA:   PSA   Date Value Ref Range Status   03/01/2021 4.86 (H) 0 - 4 ug/L Final     Comment:     Assay Method:  Chemiluminescence using Siemens Vista analyzer     Prostate Specific Antigen Screen   Date Value Ref Range Status   04/07/2022 5.34 (H) 0.00 - 4.00 ug/L Final     ASCVD Risk   The 10-year ASCVD risk score (Ayaz WATTS, et al., 2019) is: 35.6%    Values used to calculate the score:      Age: 70 years      Sex: Male      Is Non- : No      Diabetic: Yes      Tobacco smoker: No      Systolic Blood Pressure: 142 mmHg      Is BP treated: Yes      HDL Cholesterol: 42 mg/dL      Total Cholesterol: 155 mg/dL            Reviewed and updated as needed this visit by Provider                      Current providers sharing in care for this patient include:  Patient Care Team:  Kevin Jackson MD as PCP - General (Internal Medicine - Pediatrics)  Geno Montemayor RD as Diabetes Educator (Dietitian, Registered)  Dragan Macias DO as Assigned PCP    The following health maintenance items are reviewed in Epic and correct as of today:  Health Maintenance   Topic Date Due    URINE DRUG SCREEN  Never done    EYE EXAM  11/09/2013    ZOSTER IMMUNIZATION (2 of 3) 02/14/2018    LUNG CANCER SCREENING  04/12/2023    A1C  12/18/2024    COVID-19 Vaccine (8 - 2024-25 season) 05/10/2025    BMP  05/13/2025    LIPID  05/13/2025    MEDICARE ANNUAL WELLNESS VISIT  05/13/2025    ANNUAL REVIEW OF HM ORDERS   "09/18/2025    MICROALBUMIN  09/18/2025    DIABETIC FOOT EXAM  09/18/2025    FALL RISK ASSESSMENT  05/14/2026    COLORECTAL CANCER SCREENING  07/19/2028    DTAP/TDAP/TD IMMUNIZATION (3 - Td or Tdap) 09/26/2028    ADVANCE CARE PLANNING  05/13/2029    HEPATITIS C SCREENING  Completed    PHQ-2 (once per calendar year)  Completed    INFLUENZA VACCINE  Completed    Pneumococcal Vaccine: 50+ Years  Completed    RSV VACCINE  Completed    AORTIC ANEURYSM SCREENING (SYSTEM ASSIGNED)  Completed    HPV IMMUNIZATION  Aged Out    MENINGITIS IMMUNIZATION  Aged Out            Objective    Exam  BP (!) 142/84 (BP Location: Right arm, Patient Position: Chair, Cuff Size: Adult Regular)   Pulse 97   Temp 97.6  F (36.4  C) (Temporal)   Resp 16   Ht 1.657 m (5' 5.24\")   Wt 90.3 kg (199 lb 2 oz)   SpO2 95%   BMI 32.90 kg/m     Estimated body mass index is 32.9 kg/m  as calculated from the following:    Height as of this encounter: 1.657 m (5' 5.24\").    Weight as of this encounter: 90.3 kg (199 lb 2 oz).    Physical Exam  GENERAL: alert and no distress  EYES: Eyes grossly normal to inspection, PERRL and conjunctivae and sclerae normal  HENT: ear canals and TM's normal, nose and mouth without ulcers or lesions  NECK: no adenopathy, no asymmetry, masses, or scars  RESP: lungs clear to auscultation - no rales, rhonchi or wheezes  CV: regular rate and rhythm, normal S1 S2, no S3 or S4, no murmur, click or rub, no peripheral edema  ABDOMEN: soft, nontender, no hepatosplenomegaly, no masses and bowel sounds normal  MS: no gross musculoskeletal defects noted, no edema  SKIN: no suspicious lesions or rashes  NEURO: Normal strength and tone, mentation intact and speech normal  PSYCH: mentation appears normal, affect normal/bright        5/14/2025   Mini Cog   Clock Draw Score 2 Normal   3 Item Recall 3 objects recalled   Mini Cog Total Score 5              Signed Electronically by: Donte Allen MD    "

## 2025-05-14 NOTE — PATIENT INSTRUCTIONS
Start chantix, quit smoking    Keep working on healthy diet/exercise and weight loss    Schedule CT scan to screen lung cancer    We will send you lab results    Continue usual medications                    Patient Education   Preventive Care Advice   This is general advice given by our system to help you stay healthy. However, your care team may have specific advice just for you. Please talk to your care team about your preventive care needs.  Nutrition  Eat 5 or more servings of fruits and vegetables each day.  Try wheat bread, brown rice and whole grain pasta (instead of white bread, rice, and pasta).  Get enough calcium and vitamin D. Check the label on foods and aim for 100% of the RDA (recommended daily allowance).  Lifestyle  Exercise at least 150 minutes each week  (30 minutes a day, 5 days a week).  Do muscle strengthening activities 2 days a week. These help control your weight and prevent disease.  No smoking.  Wear sunscreen to prevent skin cancer.  Have a dental exam and cleaning every 6 months.  Yearly exams  See your health care team every year to talk about:  Any changes in your health.  Any medicines your care team has prescribed.  Preventive care, family planning, and ways to prevent chronic diseases.  Shots (vaccines)   HPV shots (up to age 26), if you've never had them before.  Hepatitis B shots (up to age 59), if you've never had them before.  COVID-19 shot: Get this shot when it's due.  Flu shot: Get a flu shot every year.  Tetanus shot: Get a tetanus shot every 10 years.  Pneumococcal, hepatitis A, and RSV shots: Ask your care team if you need these based on your risk.  Shingles shot (for age 50 and up)  General health tests  Diabetes screening:  Starting at age 35, Get screened for diabetes at least every 3 years.  If you are younger than age 35, ask your care team if you should be screened for diabetes.  Cholesterol test: At age 39, start having a cholesterol test every 5 years, or more  often if advised.  Bone density scan (DEXA): At age 50, ask your care team if you should have this scan for osteoporosis (brittle bones).  Hepatitis C: Get tested at least once in your life.  STIs (sexually transmitted infections)  Before age 24: Ask your care team if you should be screened for STIs.  After age 24: Get screened for STIs if you're at risk. You are at risk for STIs (including HIV) if:  You are sexually active with more than one person.  You don't use condoms every time.  You or a partner was diagnosed with a sexually transmitted infection.  If you are at risk for HIV, ask about PrEP medicine to prevent HIV.  Get tested for HIV at least once in your life, whether you are at risk for HIV or not.  Cancer screening tests  Cervical cancer screening: If you have a cervix, begin getting regular cervical cancer screening tests starting at age 21.  Breast cancer scan (mammogram): If you've ever had breasts, begin having regular mammograms starting at age 40. This is a scan to check for breast cancer.  Colon cancer screening: It is important to start screening for colon cancer at age 45.  Have a colonoscopy test every 10 years (or more often if you're at risk) Or, ask your provider about stool tests like a FIT test every year or Cologuard test every 3 years.  To learn more about your testing options, visit:   .  For help making a decision, visit:   https://bit.ly/fk95160.  Prostate cancer screening test: If you have a prostate, ask your care team if a prostate cancer screening test (PSA) at age 55 is right for you.  Lung cancer screening: If you are a current or former smoker ages 50 to 80, ask your care team if ongoing lung cancer screenings are right for you.  For informational purposes only. Not to replace the advice of your health care provider. Copyright   2023 [employer]. All rights reserved. Clinically reviewed by the [facility]. [employer] 606194 - REV  01/24.  Preventing Falls: Care Instructions  Injuries and health problems such as trouble walking or poor eyesight can increase your risk of falling. So can some medicines. But there are things you can do to help prevent falls. You can exercise to get stronger. You can also arrange your home to make it safer.    Talk to your doctor about the medicines you take. Ask if any of them increase the risk of falls and whether they can be changed or stopped.   Try to exercise regularly. It can help improve your strength and balance. This can help lower your risk of falling.         Practice fall safety and prevention.   Wear low-heeled shoes that fit well and give your feet good support. Talk to your doctor if you have foot problems that make this hard.  Carry a cellphone or wear a medical alert device that you can use to call for help.  Use stepladders instead of chairs to reach high objects. Don't climb if you're at risk for falls. Ask for help, if needed.  Wear the correct eyeglasses, if you need them.        Make your home safer.   Remove rugs, cords, clutter, and furniture from walkways.  Keep your house well lit. Use night-lights in hallways and bathrooms.  Install and use sturdy handrails on stairways.  Wear nonskid footwear, even inside. Don't walk barefoot or in socks without shoes.        Be safe outside.   Use handrails, curb cuts, and ramps whenever possible.  Keep your hands free by using a shoulder bag or backpack.  Try to walk in well-lit areas. Watch out for uneven ground, changes in pavement, and debris.  Be careful in the winter. Walk on the grass or gravel when sidewalks are slippery. Use de-icer on steps and walkways. Add non-slip devices to shoes.    Put grab bars and nonskid mats in your shower or tub and near the toilet. Try to use a shower chair or bath bench when bathing.   Get into a tub or shower by putting in your weaker leg first. Get out with your strong side first. Have a phone or medical alert  "device in the bathroom with you.   Where can you learn more?  Go to https://www.Boxbe.net/patiented  Enter G117 in the search box to learn more about \"Preventing Falls: Care Instructions.\"  Current as of: July 31, 2024  Content Version: 14.4    9762-6947 [employer].   Care instructions adapted under license by your healthcare professional. If you have questions about a medical condition or this instruction, always ask your healthcare professional. [employer] disclaims any warranty or liability for your use of this information.    Substance Use Disorder: Care Instructions  Overview     You can improve your life and health by stopping your use of alcohol or drugs. When you don't drink or use drugs, you may feel and sleep better. You may get along better with your family, friends, and coworkers. There are medicines and programs that can help with substance use disorder.  How can you care for yourself at home?  Here are some ways to help you stay sober and prevent relapse.  If you have been given medicine to help keep you sober or reduce your cravings, be sure to take it exactly as prescribed.  Talk to your doctor about programs that can help you stop using drugs or drinking alcohol.  Do not keep alcohol or drugs in your home.  Plan ahead. Think about what you'll say if other people ask you to drink or use drugs. Try not to spend time with people who drink or use drugs.  Use the time and money spent on drinking or drugs to do something that's important to you.  Preventing a relapse  Have a plan to deal with relapse. Learn to recognize changes in your thinking that lead you to drink or use drugs. Get help before you start to drink or use drugs again.  Try to stay away from situations, friends, or places that may lead you to drink or use drugs.  If you feel the need to drink alcohol or use drugs again, seek help right away. Call a trusted friend or family member. Some people get support from " organizations such as Narcotics Anonymous or [employer] or from treatment facilities.  If you relapse, get help as soon as you can. Some people make a plan with another person that outlines what they want that person to do for them if they relapse. The plan usually includes how to handle the relapse and who to notify in case of relapse.  Don't give up. Remember that a relapse doesn't mean that you have failed. Use the experience to learn the triggers that lead you to drink or use drugs. Then quit again. Recovery is a lifelong process. Many people have several relapses before they are able to quit for good.  Follow-up care is a key part of your treatment and safety. Be sure to make and go to all appointments, and call your doctor if you are having problems. It's also a good idea to know your test results and keep a list of the medicines you take.  When should you call for help?   Call 911  anytime you think you may need emergency care. For example, call if you or someone else:    Has overdosed or has withdrawal signs. Be sure to tell the emergency workers that you are or someone else is using or trying to quit using drugs. Overdose or withdrawal signs may include:  Losing consciousness.  Seizure.  Seeing or hearing things that aren't there (hallucinations).     Is thinking or talking about suicide or harming others.   Where to get help 24 hours a day, 7 days a week   If you or someone you know talks about suicide, self-harm, a mental health crisis, a substance use crisis, or any other kind of emotional distress, get help right away. You can:    Call the Suicide and Crisis Lifeline at [facility].     Call 2-481-599-TALK (1-325.154.5972).     Text HOME to 132563 to access the Crisis Text Line.   Consider saving these numbers in your phone.  Go to [employer] for more information or to chat online.  Call your doctor now or seek immediate medical care if:    You are having withdrawal symptoms. These may include nausea  "or vomiting, sweating, shakiness, and anxiety.   Watch closely for changes in your health, and be sure to contact your doctor if:    You have a relapse.     You need more help or support to stop.   Where can you learn more?  Go to https://www.[employer].net/patiented  Enter H573 in the search box to learn more about \"Substance Use Disorder: Care Instructions.\"  Current as of: August 20, 2024  Content Version: 14.4    5829-2968 [employer].   Care instructions adapted under license by your healthcare professional. If you have questions about a medical condition or this instruction, always ask your healthcare professional. [employer] disclaims any warranty or liability for your use of this information.       "

## 2025-05-15 ENCOUNTER — RESULTS FOLLOW-UP (OUTPATIENT)
Dept: FAMILY MEDICINE | Facility: CLINIC | Age: 70
End: 2025-05-15

## 2025-05-15 DIAGNOSIS — R97.20 ELEVATED PROSTATE SPECIFIC ANTIGEN (PSA): Primary | ICD-10-CM

## 2025-05-15 NOTE — RESULT ENCOUNTER NOTE
Your prostate blood test is a lot higher than last time.  You should see urology.  I did referral.  Call [phone] to schedule.    Triglycerides high.  Try to reduce sugar intake ( the liver converts sugars to triglycerides ).    The hemoglobin a1c is higher, now into diabetes range ( was prediabetes last time).  Keep working on healthy diet/exercise and weight loss then see one of us in a few months to recheck this.     Other labs are okay.    Donte Allen MD

## 2025-05-19 ENCOUNTER — PATIENT OUTREACH (OUTPATIENT)
Dept: CARE COORDINATION | Facility: CLINIC | Age: 70
End: 2025-05-19
Payer: MEDICARE

## 2025-05-22 ENCOUNTER — OFFICE VISIT (OUTPATIENT)
Dept: UROLOGY | Facility: CLINIC | Age: 70
End: 2025-05-22
Attending: FAMILY MEDICINE
Payer: MEDICARE

## 2025-05-22 VITALS
HEART RATE: 76 BPM | WEIGHT: 199 LBS | SYSTOLIC BLOOD PRESSURE: 126 MMHG | DIASTOLIC BLOOD PRESSURE: 66 MMHG | BODY MASS INDEX: 32.88 KG/M2

## 2025-05-22 DIAGNOSIS — R97.20 ELEVATED PROSTATE SPECIFIC ANTIGEN (PSA): ICD-10-CM

## 2025-05-22 NOTE — PROGRESS NOTES
S: Mayur Sheth is a pleasant 70 year old male  with history of DM 11 requesting to be seen by Dr. Allen for elevated PSA.     History of Present Illness-  Elevated PSA  Mr. Sheth has had an elevated PSA for a while, with a recent significant increase. He reports no issues with urination, such as difficulty starting or stopping, and attributes nighttime urination to diuretic use and fluid intake before bed. He is aware that high PSA levels are not necessarily indicative of prostate cancer and has been monitoring the situation. He has been receiving annual physicals where his PSA is checked. He is knowledgeable about prostate cancer and its screening processes, including the use of MRI and biopsies.          Lab Results   Component Value Date    PSA 10.70 (H) 05/14/2025    PSA 5.34 (H) 04/07/2022    PSA 4.86 (H) 03/01/2021    PSA 3.67 10/21/2019    PSA 2.60 09/26/2018         Current Outpatient Medications   Medication Sig Dispense Refill    amLODIPine (NORVASC) 5 MG tablet Take 1 tablet (5 mg) by mouth daily. 90 tablet 3    diphenhydrAMINE (BENADRYL) 25 MG capsule Take 25 mg by mouth every 6 hours as needed for itching or allergies      pravastatin (PRAVACHOL) 80 MG tablet Take 1 tablet (80 mg) by mouth daily. 90 tablet 3    triamterene-HCTZ (MAXZIDE) 75-50 MG tablet Take 1 tablet by mouth daily. 90 tablet 3    varenicline (CHANTIX) 1 MG tablet Take 1 tablet (1 mg) by mouth 2 times daily. 60 tablet 2    blood glucose (NO BRAND SPECIFIED) lancets standard Use to test blood sugar 1 times daily or as directed. 100 each 3    blood glucose (NO BRAND SPECIFIED) test strip Use to test blood sugar 1 times daily or as directed. 100 strip 3    blood glucose monitoring (NO BRAND SPECIFIED) meter device kit Use to test blood sugar 1 times daily or as directed. 1 kit 0     Allergies   Allergen Reactions    Morphine Anaphylaxis    No Clinical Screening - See Comments Anaphylaxis     Detergents,       Bee      Past Medical  History:   Diagnosis Date    AC (acromioclavicular) joint arthritis 12/01/2016    Bee sting allergies     Complete tear of left rotator cuff 12/01/2016    Diverticulosis     Essential hypertension, benign 2006    GERD (gastroesophageal reflux disease)     Hyperlipidemia LDL goal <100     Fam Hx CAD    Medial meniscus tear     L knee    Obesity, Class II, BMI 35-39.9, with comorbidity 02/14/2013    Rheumatic fever     Rotator cuff arthropathy, left     S/P rotator cuff repair 12/14/2016    left    Tubular adenoma 03/24/2008    Type 2 diabetes mellitus without complication, without long-term current use of insulin (H) 9/18/2024     Past Surgical History:   Procedure Laterality Date    ARTHROSCOPIC RECONSTRUCTION ANTERIOR CRUCIATE LIGAMENT Left 6/12/2020    Procedure: Left Revision ACL RECONSTRUCTION with allograft, and partial medial meniscectomy;  Surgeon: Donell Brower MD;  Location: [facility]    ARTHROSCOPY SHLDR ROTATOR CUFF REPAIR, SUBACROMIAL DECOMP, DIST CLAVICLE RESECTION, BICEP TENODESIS Left 12/14/2016    Procedure: ARTHROSCOPY SHOULDER ROTATOR CUFF REPAIR, SUBACROMIAL DECOMPRESSION, DISTAL CLAVICLE RESECTION, OPEN BICEP TENODESIS REPAIR;  Surgeon: Donell Brower MD;  Location: [facility]    COLONOSCOPY N/A 4/28/2022    Procedure: COLONOSCOPY, FLEXIBLE, WITH LESION REMOVAL USING SNARE;  Surgeon: Nathan Gaines MD;  Location: [facility]    COLONOSCOPY N/A 7/19/2023    Procedure: COLONOSCOPY, FLEXIBLE, WITH LESION REMOVAL USING SNARE;  Surgeon: Kumar Martin MD;  Location: [facility]    COLONOSCOPY N/A 7/19/2023    Procedure: COLONOSCOPY, WITH POLYPECTOMY AND BIOPSY;  Surgeon: Kumar Martin MD;  Location: [facility]    COLONOSCOPY WITH CO2 INSUFFLATION N/A 4/28/2022    Procedure: COLONOSCOPY, WITH CO2 INSUFFLATION;  Surgeon: Nathan Gaines MD;  Location: [facility]    COLONOSCOPY WITH CO2 INSUFFLATION N/A 7/19/2023    Procedure: Colonoscopy with CO2 insufflation;  Surgeon: Veronica  Kumar RENE MD;  Location: [facility]    TONSILLECTOMY & ADENOIDECTOMY      ZZC REPAIR CRUCIATE LIGAMENT,KNEE  ~    LT ACL      Family History   Problem Relation Age of Onset    Hypertension Mother     Diabetes Mother     Heart Disease Mother         AFib    Myocardial Infarction Mother     Cerebrovascular Disease Mother     Heart Failure Mother          74ish    Hypertension Father     Myocardial Infarction Father 62            Psoriasis Sister     Myocardial Infarction Brother 43        MI, multiple    Diabetes Maternal Grandmother     Cancer - colorectal Paternal Grandmother     Diabetes Paternal Grandmother     Cerebrovascular Disease Paternal Grandmother     Thyroid Disease No family hx of     Glaucoma No family hx of     Macular Degeneration No family hx of     Anesthesia Reaction No family hx of     Thrombophilia No family hx of     Bleeding Disorder No family hx of      Social History     Socioeconomic History    Marital status:      Spouse name: None    Number of children: 3    Years of education: 16    Highest education level: Associate degree: occupational, technical, or vocational program   Occupational History    Occupation: retired   Tobacco Use    Smoking status: Every Day     Current packs/day: 0.00     Average packs/day: 1 pack/day for 45.0 years (45.0 ttl pk-yrs)     Types: Cigarettes     Start date: 3/7/1977     Last attempt to quit: 3/7/2022     Years since quitting: 3.2     Passive exposure: Current    Smokeless tobacco: Never    Tobacco comments:     requit, uses Chantix successfully   Vaping Use    Vaping status: Never Used   Substance and Sexual Activity    Alcohol use: Yes     Alcohol/week: 6.0 standard drinks of alcohol     Types: 6 Standard drinks or equivalent per week     Comment: 10 beers per month    Drug use: No    Sexual activity: Yes     Partners: Female   Other Topics Concern    Parent/sibling w/ CABG, MI or angioplasty before 65F 55M? Yes   Social History Narrative     Daughter  of liver failure (accidental APAP overdose) ~3/07        3 child was a baby left on their doorstep      Social Drivers of Health     Financial Resource Strain: Low Risk  (2025)    Financial Resource Strain     Within the past 12 months, have you or your family members you live with been unable to get utilities (heat, electricity) when it was really needed?: No   Food Insecurity: Low Risk  (2025)    Food Insecurity     Within the past 12 months, did you worry that your food would run out before you got money to buy more?: No     Within the past 12 months, did the food you bought just not last and you didn t have money to get more?: No   Transportation Needs: Low Risk  (2025)    Transportation Needs     Within the past 12 months, has lack of transportation kept you from medical appointments, getting your medicines, non-medical meetings or appointments, work, or from getting things that you need?: No   Physical Activity: Unknown (2025)    Exercise Vital Sign     Days of Exercise per Week: 1 day   Stress: No Stress Concern Present (2025)    [employer] - Occupational Stress Questionnaire     Feeling of Stress : Only a little   Social Connections: Unknown (2025)    Social Connection and Isolation Panel [NHANES]     Frequency of Social Gatherings with Friends and Family: Twice a week   Interpersonal Safety: Low Risk  (2025)    Interpersonal Safety     Do you feel physically and emotionally safe where you currently live?: Yes     Within the past 12 months, have you been hit, slapped, kicked or otherwise physically hurt by someone?: No     Within the past 12 months, have you been humiliated or emotionally abused in other ways by your partner or ex-partner?: No   Housing Stability: Low Risk  (2025)    Housing Stability     Do you have housing? : Yes     Are you worried about losing your housing?: No       REVIEW OF  SYSTEMS  =================  C: NEGATIVE for fever, chills, change in weight  I: NEGATIVE for worrisome rashes, moles or lesions  E/M: NEGATIVE for ear, mouth and throat problems  R: NEGATIVE for significant cough or SHORTNESS OF BREATH  CV:  NEGATIVE for chest pain, palpitations or peripheral edema  GI: NEGATIVE for nausea, abdominal pain, heartburn, or change in bowel habits  NEURO: NEGATIVE numbness/weakness  : see HPI  PSYCH: NEGATIVE depression/anxiety  LYmph: no new enlarged lymph nodes  Ortho: no new trauma/movements      Physical Exam:  /66   Pulse 76   Wt 90.3 kg (199 lb)   BMI 32.88 kg/m     Patient is pleasant, in no acute distress, good general condition.  Heart:  negative   Lung: no evidence of respiratory distress    Abdomen: Soft, nondistended, non tender. No masses. No rebound or guarding.  ALEXANDRA: Refused   Skin: Warm and dry.  No redness.  Neuro: grossly normal  Musculaskeletal: moving all extremities  Psych normal mood and affect  Musculoskeletal  moving all extremities      Assessment/Plan:   Elevated PSA   It was my pleasure speaking with Mr. Sheth today for discussion of his elevated PSA. We first discussed the etiologies of elevated PSA, including infection, inflammation, ejaculation prior to sampling, BPH, recent perineal trauma or catheterization, versus malignancy. We then discussed the natural history of prostate cancer and how it shapes our prostate cancer screening.  After a detailed discussion during which we reviewed the risks and benefits of the above options, Mr. Sheth elects to proceed with MRI of the prostate. Mr. Sheth expressed understanding and agreement to the above discussion and plan and all of his questions were answered to his satisfaction.       MARIJA Montoya CNP    Consent was obtained from the patient to use an AI documentation tool in the creation of this note.  Voice recognition software was also used.  There may be associated transcribing  errors.

## 2025-05-22 NOTE — PATIENT INSTRUCTIONS
"Jeyson Sheth, it was nice to meet you!    Thank you for allowing us the privilege of caring for you. We hope we provided you with the excellent service you deserve.   Please let us know if there is anything else we can do for you.  We want you to be completely satisfied with your care experience.    To ensure the quality of our services, you may be receiving a patient satisfaction survey from an independent patient satisfaction monitoring company.    The greatest compliment you can give is a \"Likely to Recommend.\"    Your visit was with MARIJA Montoya CNP today.    Instructions per today's visit:     Complete your MRI of your prostate, will let you know results.     ___________________________________________________________________________  Important contact and scheduling information:  Please call our contact center at [phone] to schedule your next appointments or to reach our nurse triage line.  Please call during clinic hours Monday through Friday 8:00a - 4:30p if you have questions.  You can contact us anytime via [employer] and we will reply during clinic hours.    Lab results will be communicated through My Chart or letter (if My Chart not used). Please call the clinic if you have not received communication after 1 week or if you have any questions.?  __________________________________________________________________________    If labs were ordered today:    Please make an appointment to have them drawn at your convenience.     To schedule the Lab Appointment using [employer]atch:  Select \"Schedule an Appointment\"  Select \"Lab Only\"  Answer simple questions about where you would like to be seen and your type of insurance  For \"Which locations work for you?, select the location and set up the appointment.      "

## 2025-05-22 NOTE — Clinical Note
Thank you for the referral! I have enjoyed participating in the medical care of this very pleasant patient.  Please don't hesitate to contact me with any questions or concerns.  MARIJA Montoya  [phone]

## 2025-07-22 PROBLEM — E11.9 TYPE 2 DIABETES MELLITUS WITHOUT COMPLICATION, WITHOUT LONG-TERM CURRENT USE OF INSULIN (H): Status: ACTIVE | Noted: 2024-09-18

## 2025-07-22 PROBLEM — Z72.0 TOBACCO ABUSE: Status: ACTIVE | Noted: 2020-02-24

## 2025-07-22 PROBLEM — Z47.89 AFTERCARE FOLLOWING SURGERY OF THE MUSCULOSKELETAL SYSTEM: Status: RESOLVED | Noted: 2020-07-10 | Resolved: 2025-07-22

## 2025-08-05 PROBLEM — M25.562 CHRONIC PAIN OF LEFT KNEE: Status: RESOLVED | Noted: 2020-07-10 | Resolved: 2025-08-05

## 2025-08-05 PROBLEM — G89.29 CHRONIC PAIN OF LEFT KNEE: Status: RESOLVED | Noted: 2020-07-10 | Resolved: 2025-08-05

## 2025-08-16 ENCOUNTER — HEALTH MAINTENANCE LETTER (OUTPATIENT)
Age: 70
End: 2025-08-16

## 2025-08-28 ENCOUNTER — LAB (OUTPATIENT)
Dept: LAB | Facility: CLINIC | Age: 70
End: 2025-08-28
Payer: MEDICARE

## 2025-08-28 DIAGNOSIS — E11.9 TYPE 2 DIABETES MELLITUS WITHOUT COMPLICATION, WITHOUT LONG-TERM CURRENT USE OF INSULIN (H): Primary | ICD-10-CM

## 2025-08-28 LAB
CREAT UR-MCNC: 94.2 MG/DL
EST. AVERAGE GLUCOSE BLD GHB EST-MCNC: 146 MG/DL
HBA1C MFR BLD: 6.7 % (ref 0–5.6)
MICROALBUMIN UR-MCNC: <12 MG/L
MICROALBUMIN/CREAT UR: NORMAL MG/G{CREAT}

## (undated) DEVICE — SOL WATER IRRIG 1000ML BOTTLE 07139-09

## (undated) DEVICE — MANIFOLD NEPTUNE 4 PORT 700-20

## (undated) DEVICE — PACK ACL SUPPLEMENT STD

## (undated) DEVICE — KIT ARTHREX TRANSTIBIAL ACL DISP W/SAW BLADE AR-1897S

## (undated) DEVICE — SU VICRYL 2-0 CT-2 27" UND J269H

## (undated) DEVICE — ENDO SNARE OVAL 2.5X4.0CM W/25GA NDL

## (undated) DEVICE — DRSG GAUZE 4X4" TRAY 6939

## (undated) DEVICE — SPECIMEN CONTAINER 60MLW/10% FORMALIN 59601

## (undated) DEVICE — DEVICE RETRIEVAL ROTH NET PLATINUM UNIV 2.5MMX230CM 00715050

## (undated) DEVICE — ENDO CATH ESOPH BALLOON 10-11-12MMX180CM CRE FIXED WIRE

## (undated) DEVICE — ENDO CATH ESOPH BALLOON 15-16.5-18MMX180CM CRE FIXED WIRE

## (undated) DEVICE — SUCTION TIP YANKAUER W/O VENT K86

## (undated) DEVICE — PREP CHLORAPREP 26ML TINTED ORANGE  260815

## (undated) DEVICE — ENDO MARKER SPOT 5ML

## (undated) DEVICE — ENDO FORCEP ENDOJAW BIOPSY 2.0MMX155CM FB-221K

## (undated) DEVICE — ENDO CATH ESOPH/PYL/COLONIC BALLOON 08-9-10MMX240CM CRE

## (undated) DEVICE — SU ETHILON 3-0 PS-2 18" 1669H

## (undated) DEVICE — DRAPE SHEET 3/4 78X60"

## (undated) DEVICE — ENDO CATH ESOPH/PYL/COLONIC BALLOON 15-16.5-18MMX240CM CRE

## (undated) DEVICE — #2 FIBERSTICK

## (undated) DEVICE — SWAB PROCTO 16" NON-STERILE

## (undated) DEVICE — GLOVE PROTEXIS W/NEU-THERA 8.5  2D73TE85

## (undated) DEVICE — BUR ARTHREX COOLCUT DISSECTOR 4.0MMX13CM AR-8400DS

## (undated) DEVICE — SPECIMEN TRAP VACUUM SUCTION 003984-901

## (undated) DEVICE — Device

## (undated) DEVICE — SYR 50ML SLIP TIP W/O NDL 309654

## (undated) DEVICE — CUP DENTURE 7.5OZ GOLD DISP

## (undated) DEVICE — SUCTION CANISTER MEDIVAC LINER 1500ML W/LID 65651-515

## (undated) DEVICE — BNDG ESMARK 6" STERILE

## (undated) DEVICE — SYR INFLATOR AND GAUGE 60ML M00550601

## (undated) DEVICE — GLOVE PROTEXIS W/NEU-THERA 8.0  2D73TE80

## (undated) DEVICE — SOL NACL 0.9% IRRIG 3000ML BAG 07972-08

## (undated) DEVICE — ENDO CATH ESOPH/PYL/COLONIC BALLOON 18-19-20MMX240CM CRE

## (undated) DEVICE — ENDO CATH ESOPH/PYL/COLONIC BALLOON 10-11-12MMX240CM CRE

## (undated) DEVICE — PACK ARTHROSCOPY KNEE SOP15AKFSM

## (undated) DEVICE — ENDO CATH ESOPH BALLOON 06-7-8MMX180CM CRE FIXED WIRE

## (undated) DEVICE — BNDG ELASTIC 6"X5YDS UNSTERILE 6611-60

## (undated) DEVICE — FLIPCUTTER DRILL

## (undated) DEVICE — ESU PENCIL SMOKE EVAC W/ROCKER SWITCH 0703-047-000

## (undated) DEVICE — SYR 50ML CATH TIP W/O NDL 309620

## (undated) DEVICE — ENDO CATH ESOPH BALLOON 12-13.5-15MMX180CM CRE FIXED WIRE

## (undated) DEVICE — DRSG STERI STRIP 1/2X4" R1547

## (undated) DEVICE — MARKER SKIN DOUBLE TIP W/FLEXI-RULER W/LABELS

## (undated) DEVICE — ENDO CATH ESOPH BALLOON 08-9-10MMX180CM CRE FIXED WIRE

## (undated) DEVICE — SPECIMEN TRAP 80ML BUSSE BW407

## (undated) DEVICE — DRAPE C-ARM MINI 5423

## (undated) DEVICE — ENDO CATH ESOPH BALLOON 18-19-20MMX180CM CRE FIXED WIRE

## (undated) DEVICE — DRSG ABDOMINAL 07 1/2X8" 7197D

## (undated) DEVICE — SU MONOCRYL 3-0 PS-2 27" Y427H

## (undated) DEVICE — ENDO CATH ESOPH/PYL/COLONIC BALLOON 12-13.5-15MMX240CM CRE

## (undated) DEVICE — ENDO FORCEP ENDOJAW BIOPSY 2.8MMX230CM FB-220U

## (undated) DEVICE — ESU ARTHROWAND 90DEG RF AMBIENT SUPER TURBOVAC ASHA4250-01

## (undated) DEVICE — ESU ERBE FIAPC PROBE 2.3MMX220CM

## (undated) DEVICE — GLOVE PROTEXIS W/NEU-THERA 7.5  2D73TE75

## (undated) DEVICE — GLOVE PROTEXIS POWDER FREE 8.5 ORTHOPEDIC 2D73ET85

## (undated) RX ORDER — DEXAMETHASONE SODIUM PHOSPHATE 4 MG/ML
INJECTION, SOLUTION INTRA-ARTICULAR; INTRALESIONAL; INTRAMUSCULAR; INTRAVENOUS; SOFT TISSUE
Status: DISPENSED
Start: 2020-06-12

## (undated) RX ORDER — PROPOFOL 10 MG/ML
INJECTION, EMULSION INTRAVENOUS
Status: DISPENSED
Start: 2020-06-12

## (undated) RX ORDER — ACETAMINOPHEN 325 MG/1
TABLET ORAL
Status: DISPENSED
Start: 2020-06-12

## (undated) RX ORDER — CEFAZOLIN SODIUM 2 G/100ML
INJECTION, SOLUTION INTRAVENOUS
Status: DISPENSED
Start: 2020-06-12

## (undated) RX ORDER — OXYCODONE HYDROCHLORIDE 5 MG/1
TABLET ORAL
Status: DISPENSED
Start: 2020-06-12

## (undated) RX ORDER — CELECOXIB 200 MG/1
CAPSULE ORAL
Status: DISPENSED
Start: 2020-06-12

## (undated) RX ORDER — FENTANYL CITRATE 50 UG/ML
INJECTION, SOLUTION INTRAMUSCULAR; INTRAVENOUS
Status: DISPENSED
Start: 2023-07-19

## (undated) RX ORDER — FENTANYL CITRATE 50 UG/ML
INJECTION, SOLUTION INTRAMUSCULAR; INTRAVENOUS
Status: DISPENSED
Start: 2022-04-28

## (undated) RX ORDER — FENTANYL CITRATE 50 UG/ML
INJECTION, SOLUTION INTRAMUSCULAR; INTRAVENOUS
Status: DISPENSED
Start: 2020-06-12

## (undated) RX ORDER — LIDOCAINE HYDROCHLORIDE 10 MG/ML
INJECTION, SOLUTION EPIDURAL; INFILTRATION; INTRACAUDAL; PERINEURAL
Status: DISPENSED
Start: 2020-06-12

## (undated) RX ORDER — BUPIVACAINE HYDROCHLORIDE 2.5 MG/ML
INJECTION, SOLUTION INFILTRATION; PERINEURAL
Status: DISPENSED
Start: 2020-06-12

## (undated) RX ORDER — LIDOCAINE HYDROCHLORIDE 20 MG/ML
INJECTION, SOLUTION INFILTRATION; PERINEURAL
Status: DISPENSED
Start: 2020-06-12

## (undated) RX ORDER — SIMETHICONE 40MG/0.6ML
SUSPENSION, DROPS(FINAL DOSAGE FORM)(ML) ORAL
Status: DISPENSED
Start: 2023-07-19

## (undated) RX ORDER — BUPIVACAINE HYDROCHLORIDE 2.5 MG/ML
INJECTION, SOLUTION EPIDURAL; INFILTRATION; INTRACAUDAL
Status: DISPENSED
Start: 2020-06-12

## (undated) RX ORDER — EPINEPHRINE 1 MG/ML
INJECTION, SOLUTION INTRAMUSCULAR; SUBCUTANEOUS
Status: DISPENSED
Start: 2020-06-12

## (undated) RX ORDER — CEFAZOLIN SODIUM 1 G/3ML
INJECTION, POWDER, FOR SOLUTION INTRAMUSCULAR; INTRAVENOUS
Status: DISPENSED
Start: 2020-06-12

## (undated) RX ORDER — LIDOCAINE HYDROCHLORIDE AND EPINEPHRINE 10; 10 MG/ML; UG/ML
INJECTION, SOLUTION INFILTRATION; PERINEURAL
Status: DISPENSED
Start: 2020-06-12

## (undated) RX ORDER — LABETALOL HYDROCHLORIDE 5 MG/ML
INJECTION, SOLUTION INTRAVENOUS
Status: DISPENSED
Start: 2020-06-12

## (undated) RX ORDER — ONDANSETRON 2 MG/ML
INJECTION INTRAMUSCULAR; INTRAVENOUS
Status: DISPENSED
Start: 2020-06-12

## (undated) RX ORDER — KETAMINE HYDROCHLORIDE 50 MG/ML
INJECTION, SOLUTION INTRAMUSCULAR; INTRAVENOUS
Status: DISPENSED
Start: 2020-06-12